# Patient Record
Sex: MALE | Race: WHITE | NOT HISPANIC OR LATINO | Employment: UNEMPLOYED | URBAN - METROPOLITAN AREA
[De-identification: names, ages, dates, MRNs, and addresses within clinical notes are randomized per-mention and may not be internally consistent; named-entity substitution may affect disease eponyms.]

---

## 2018-07-17 PROBLEM — L02.413 CUTANEOUS ABSCESS OF RIGHT UPPER EXTREMITY: Status: ACTIVE | Noted: 2018-07-17

## 2018-07-18 PROBLEM — D72.829 LEUKOCYTOSIS: Status: ACTIVE | Noted: 2018-07-18

## 2018-07-18 PROBLEM — Z87.898 HISTORY OF INTRAVENOUS DRUG ABUSE: Status: ACTIVE | Noted: 2018-07-18

## 2018-07-18 PROBLEM — Z86.19 HISTORY OF HEPATITIS C: Status: ACTIVE | Noted: 2018-07-18

## 2018-07-18 PROBLEM — F32.9 MAJOR DEPRESSIVE DISORDER: Chronic | Status: ACTIVE | Noted: 2018-07-18

## 2018-07-18 PROBLEM — F41.1 GENERALIZED ANXIETY DISORDER: Chronic | Status: ACTIVE | Noted: 2018-07-18

## 2019-01-01 ENCOUNTER — ANESTHESIA (OUTPATIENT)
Dept: SURGERY | Facility: HOSPITAL | Age: 34
DRG: 951 | End: 2019-01-01
Payer: COMMERCIAL

## 2019-01-01 ENCOUNTER — HOSPITAL ENCOUNTER (OUTPATIENT)
Dept: RADIOLOGY | Facility: HOSPITAL | Age: 34
Discharge: HOME | End: 2019-07-19
Attending: PHYSICIAN ASSISTANT
Payer: MEDICAID

## 2019-01-01 ENCOUNTER — HOSPITAL ENCOUNTER (INPATIENT)
Facility: HOSPITAL | Age: 34
LOS: 9 days | Discharge: NURSING, REHAB OR LONG-TERM CARE | DRG: 917 | End: 2019-06-28
Attending: EMERGENCY MEDICINE | Admitting: ANESTHESIOLOGY
Payer: MEDICAID

## 2019-01-01 ENCOUNTER — HOSPITAL ENCOUNTER (INPATIENT)
Facility: HOSPITAL | Age: 34
LOS: 2 days | DRG: 951 | End: 2019-09-21
Payer: COMMERCIAL

## 2019-01-01 ENCOUNTER — HOSPITAL ENCOUNTER (INPATIENT)
Facility: HOSPITAL | Age: 34
LOS: 4 days | Discharge: ANOTHER HOSPITAL | DRG: 896 | End: 2019-06-19
Attending: HOSPITALIST | Admitting: HOSPITALIST
Payer: MEDICAID

## 2019-01-01 ENCOUNTER — TELEPHONE (OUTPATIENT)
Dept: NEUROLOGY | Facility: CLINIC | Age: 34
End: 2019-01-01

## 2019-01-01 ENCOUNTER — ANESTHESIA EVENT (OUTPATIENT)
Dept: SURGERY | Facility: HOSPITAL | Age: 34
DRG: 951 | End: 2019-01-01
Payer: COMMERCIAL

## 2019-01-01 ENCOUNTER — TELEPHONE (OUTPATIENT)
Dept: NEUROSURGERY | Facility: CLINIC | Age: 34
End: 2019-01-01

## 2019-01-01 ENCOUNTER — HOSPITAL ENCOUNTER (INPATIENT)
Facility: HOSPITAL | Age: 34
LOS: 1 days | DRG: 917 | End: 2019-09-19
Attending: INTERNAL MEDICINE | Admitting: INTERNAL MEDICINE
Payer: COMMERCIAL

## 2019-01-01 VITALS
TEMPERATURE: 99 F | SYSTOLIC BLOOD PRESSURE: 114 MMHG | BODY MASS INDEX: 18.75 KG/M2 | HEIGHT: 70 IN | OXYGEN SATURATION: 93 % | RESPIRATION RATE: 18 BRPM | DIASTOLIC BLOOD PRESSURE: 70 MMHG | HEART RATE: 99 BPM | WEIGHT: 131 LBS

## 2019-01-01 VITALS
WEIGHT: 139.56 LBS | HEIGHT: 68 IN | TEMPERATURE: 99 F | DIASTOLIC BLOOD PRESSURE: 50 MMHG | RESPIRATION RATE: 28 BRPM | SYSTOLIC BLOOD PRESSURE: 92 MMHG | BODY MASS INDEX: 21.15 KG/M2 | HEART RATE: 129 BPM | OXYGEN SATURATION: 90 %

## 2019-01-01 VITALS
WEIGHT: 131.38 LBS | HEART RATE: 95 BPM | BODY MASS INDEX: 18.81 KG/M2 | TEMPERATURE: 101 F | SYSTOLIC BLOOD PRESSURE: 130 MMHG | HEIGHT: 70 IN | OXYGEN SATURATION: 97 % | RESPIRATION RATE: 29 BRPM | DIASTOLIC BLOOD PRESSURE: 64 MMHG

## 2019-01-01 VITALS
BODY MASS INDEX: 20.65 KG/M2 | RESPIRATION RATE: 28 BRPM | HEART RATE: 103 BPM | WEIGHT: 135.81 LBS | SYSTOLIC BLOOD PRESSURE: 141 MMHG | TEMPERATURE: 96 F | DIASTOLIC BLOOD PRESSURE: 99 MMHG | OXYGEN SATURATION: 100 %

## 2019-01-01 DIAGNOSIS — R41.0 DELIRIUM: ICD-10-CM

## 2019-01-01 DIAGNOSIS — I61.9 NONTRAUMATIC INTRACEREBRAL HEMORRHAGE, UNSPECIFIED CEREBRAL LOCATION, UNSPECIFIED LATERALITY: Primary | ICD-10-CM

## 2019-01-01 DIAGNOSIS — I61.1 NONTRAUMATIC CORTICAL HEMORRHAGE OF RIGHT CEREBRAL HEMISPHERE: ICD-10-CM

## 2019-01-01 DIAGNOSIS — D72.829 LEUKOCYTOSIS, UNSPECIFIED TYPE: ICD-10-CM

## 2019-01-01 DIAGNOSIS — R74.01 ELEVATED TRANSAMINASE LEVEL: ICD-10-CM

## 2019-01-01 DIAGNOSIS — Z52.9 ORGAN DONATION: ICD-10-CM

## 2019-01-01 DIAGNOSIS — I61.1 NONTRAUMATIC CORTICAL HEMORRHAGE OF RIGHT CEREBRAL HEMISPHERE: Primary | ICD-10-CM

## 2019-01-01 DIAGNOSIS — F19.10 DRUG ABUSE, IV: ICD-10-CM

## 2019-01-01 DIAGNOSIS — J96.01 ACUTE RESPIRATORY FAILURE WITH HYPOXIA: ICD-10-CM

## 2019-01-01 DIAGNOSIS — G81.94 LEFT HEMIPARESIS: ICD-10-CM

## 2019-01-01 DIAGNOSIS — G93.1 ANOXIC BRAIN INJURY: ICD-10-CM

## 2019-01-01 DIAGNOSIS — R09.89 JVD (JUGULAR VENOUS DISTENSION): ICD-10-CM

## 2019-01-01 DIAGNOSIS — M62.82 NON-TRAUMATIC RHABDOMYOLYSIS: ICD-10-CM

## 2019-01-01 DIAGNOSIS — I61.0 NONTRAUMATIC SUBCORTICAL HEMORRHAGE OF CEREBRAL HEMISPHERE, UNSPECIFIED LATERALITY: ICD-10-CM

## 2019-01-01 DIAGNOSIS — I62.9 INTRACRANIAL HEMORRHAGE: ICD-10-CM

## 2019-01-01 DIAGNOSIS — I61.9 ICH (INTRACEREBRAL HEMORRHAGE): ICD-10-CM

## 2019-01-01 DIAGNOSIS — I61.1 NONTRAUMATIC CORTICAL HEMORRHAGE OF CEREBRAL HEMISPHERE, UNSPECIFIED LATERALITY: ICD-10-CM

## 2019-01-01 DIAGNOSIS — G93.5 BRAIN COMPRESSION: ICD-10-CM

## 2019-01-01 DIAGNOSIS — I46.9 CARDIAC ARREST: ICD-10-CM

## 2019-01-01 DIAGNOSIS — I61.1: ICD-10-CM

## 2019-01-01 DIAGNOSIS — E87.5 HYPERKALEMIA: ICD-10-CM

## 2019-01-01 DIAGNOSIS — I61.5 IVH (INTRAVENTRICULAR HEMORRHAGE): ICD-10-CM

## 2019-01-01 LAB
ABO + RH BLD: NORMAL
ALBUMIN SERPL BCP-MCNC: 2.6 G/DL (ref 3.5–5.2)
ALBUMIN SERPL BCP-MCNC: 2.7 G/DL (ref 3.5–5.2)
ALBUMIN SERPL BCP-MCNC: 2.8 G/DL (ref 3.5–5.2)
ALBUMIN SERPL BCP-MCNC: 2.9 G/DL (ref 3.5–5.2)
ALBUMIN SERPL BCP-MCNC: 3 G/DL (ref 3.5–5.2)
ALBUMIN SERPL BCP-MCNC: 3.2 G/DL (ref 3.5–5.2)
ALBUMIN SERPL BCP-MCNC: 3.3 G/DL (ref 3.5–5.2)
ALBUMIN SERPL BCP-MCNC: 3.6 G/DL (ref 3.5–5.2)
ALBUMIN SERPL BCP-MCNC: 3.7 G/DL (ref 3.5–5.2)
ALBUMIN SERPL BCP-MCNC: 3.8 G/DL (ref 3.5–5.2)
ALBUMIN SERPL BCP-MCNC: 3.8 G/DL (ref 3.5–5.2)
ALBUMIN SERPL BCP-MCNC: 3.9 G/DL (ref 3.5–5.2)
ALBUMIN SERPL BCP-MCNC: 3.9 G/DL (ref 3.5–5.2)
ALBUMIN SERPL BCP-MCNC: 4 G/DL (ref 3.5–5.2)
ALBUMIN SERPL BCP-MCNC: 4.2 G/DL (ref 3.5–5.2)
ALLENS TEST: ABNORMAL
ALP SERPL-CCNC: 100 U/L (ref 55–135)
ALP SERPL-CCNC: 101 U/L (ref 55–135)
ALP SERPL-CCNC: 107 U/L (ref 55–135)
ALP SERPL-CCNC: 109 U/L (ref 55–135)
ALP SERPL-CCNC: 54 U/L (ref 55–135)
ALP SERPL-CCNC: 54 U/L (ref 55–135)
ALP SERPL-CCNC: 59 U/L (ref 55–135)
ALP SERPL-CCNC: 61 U/L (ref 55–135)
ALP SERPL-CCNC: 62 U/L (ref 55–135)
ALP SERPL-CCNC: 64 U/L (ref 55–135)
ALP SERPL-CCNC: 66 U/L (ref 55–135)
ALP SERPL-CCNC: 70 U/L (ref 55–135)
ALP SERPL-CCNC: 73 U/L (ref 55–135)
ALP SERPL-CCNC: 77 U/L (ref 55–135)
ALP SERPL-CCNC: 78 U/L (ref 55–135)
ALP SERPL-CCNC: 93 U/L (ref 55–135)
ALP SERPL-CCNC: 98 U/L (ref 55–135)
ALT SERPL W/O P-5'-P-CCNC: 105 U/L (ref 10–44)
ALT SERPL W/O P-5'-P-CCNC: 108 U/L (ref 10–44)
ALT SERPL W/O P-5'-P-CCNC: 111 U/L (ref 10–44)
ALT SERPL W/O P-5'-P-CCNC: 143 U/L (ref 10–44)
ALT SERPL W/O P-5'-P-CCNC: 168 U/L (ref 10–44)
ALT SERPL W/O P-5'-P-CCNC: 186 U/L (ref 10–44)
ALT SERPL W/O P-5'-P-CCNC: 215 U/L (ref 10–44)
ALT SERPL W/O P-5'-P-CCNC: 237 U/L (ref 10–44)
ALT SERPL W/O P-5'-P-CCNC: 272 U/L (ref 10–44)
ALT SERPL W/O P-5'-P-CCNC: 37 U/L (ref 10–44)
ALT SERPL W/O P-5'-P-CCNC: 41 U/L (ref 10–44)
ALT SERPL W/O P-5'-P-CCNC: 46 U/L (ref 10–44)
ALT SERPL W/O P-5'-P-CCNC: 49 U/L (ref 10–44)
ALT SERPL W/O P-5'-P-CCNC: 52 U/L (ref 10–44)
ALT SERPL W/O P-5'-P-CCNC: 64 U/L (ref 10–44)
ALT SERPL W/O P-5'-P-CCNC: 74 U/L (ref 10–44)
ALT SERPL W/O P-5'-P-CCNC: 79 U/L (ref 10–44)
ALT SERPL W/O P-5'-P-CCNC: 89 U/L (ref 10–44)
ALT SERPL W/O P-5'-P-CCNC: 89 U/L (ref 10–44)
AMORPH CRY UR QL COMP ASSIST: ABNORMAL
AMYLASE SERPL-CCNC: 107 U/L (ref 20–110)
AMYLASE SERPL-CCNC: 115 U/L (ref 20–110)
AMYLASE SERPL-CCNC: 234 U/L (ref 20–110)
AMYLASE SERPL-CCNC: 300 U/L (ref 20–110)
ANION GAP SERPL CALC-SCNC: 10 MMOL/L (ref 8–16)
ANION GAP SERPL CALC-SCNC: 11 MMOL/L (ref 8–16)
ANION GAP SERPL CALC-SCNC: 12 MMOL/L (ref 8–16)
ANION GAP SERPL CALC-SCNC: 13 MMOL/L (ref 8–16)
ANION GAP SERPL CALC-SCNC: 14 MMOL/L (ref 8–16)
ANION GAP SERPL CALC-SCNC: 16 MMOL/L (ref 8–16)
ANION GAP SERPL CALC-SCNC: 16 MMOL/L (ref 8–16)
ANION GAP SERPL CALC-SCNC: 8 MMOL/L (ref 8–16)
ANION GAP SERPL CALC-SCNC: 9 MMOL/L (ref 8–16)
ANISOCYTOSIS BLD QL SMEAR: SLIGHT
AORTIC ROOT ANNULUS: 3.25 CM
AORTIC VALVE CUSP SEPERATION: 2.14 CM
APTT BLDCRRT: 28.4 SEC (ref 21–32)
APTT BLDCRRT: 29.6 SEC (ref 21–32)
APTT BLDCRRT: 32.2 SEC (ref 21–32)
APTT BLDCRRT: 34.2 SEC (ref 21–32)
APTT BLDCRRT: 34.5 SEC (ref 21–32)
APTT BLDCRRT: 34.9 SEC (ref 21–32)
APTT BLDCRRT: 34.9 SEC (ref 21–32)
APTT BLDCRRT: 35.4 SEC (ref 21–32)
APTT BLDCRRT: 35.8 SEC (ref 21–32)
AST SERPL-CCNC: 119 U/L (ref 10–40)
AST SERPL-CCNC: 124 U/L (ref 10–40)
AST SERPL-CCNC: 139 U/L (ref 10–40)
AST SERPL-CCNC: 24 U/L (ref 10–40)
AST SERPL-CCNC: 25 U/L (ref 10–40)
AST SERPL-CCNC: 27 U/L (ref 10–40)
AST SERPL-CCNC: 33 U/L (ref 10–40)
AST SERPL-CCNC: 34 U/L (ref 10–40)
AST SERPL-CCNC: 343 U/L (ref 10–40)
AST SERPL-CCNC: 418 U/L (ref 10–40)
AST SERPL-CCNC: 44 U/L (ref 10–40)
AST SERPL-CCNC: 53 U/L (ref 10–40)
AST SERPL-CCNC: 68 U/L (ref 10–40)
AST SERPL-CCNC: 72 U/L (ref 10–40)
AST SERPL-CCNC: 89 U/L (ref 10–40)
AST SERPL-CCNC: 92 U/L (ref 10–40)
AST SERPL-CCNC: 92 U/L (ref 10–40)
AV INDEX (PROSTH): 0.8
AV INDEX (PROSTH): 1.05
AV INDEX (PROSTH): 1.06
AV MEAN GRADIENT: 1 MMHG
AV MEAN GRADIENT: 3 MMHG
AV MEAN GRADIENT: 6.32 MMHG
AV PEAK GRADIENT: 2 MMHG
AV PEAK GRADIENT: 6 MMHG
AV PEAK GRADIENT: 9.73 MMHG
AV VALVE AREA: 2.9 CM2
AV VALVE AREA: 3.02 CM2
AV VALVE AREA: 3.53 CM2
AV VELOCITY RATIO: 0.74
AV VELOCITY RATIO: 1.03
AV VELOCITY RATIO: 1.04
BACTERIA #/AREA URNS AUTO: ABNORMAL /HPF
BACTERIA #/AREA URNS AUTO: ABNORMAL /HPF
BACTERIA #/AREA URNS HPF: ABNORMAL /HPF
BACTERIA #/AREA URNS HPF: NORMAL /HPF
BACTERIA #/AREA URNS HPF: NORMAL /HPF
BACTERIA BLD CULT: NORMAL
BACTERIA SPEC AEROBE CULT: NORMAL
BACTERIA SPEC AEROBE CULT: NORMAL
BACTERIA UR CULT: NO GROWTH
BASOPHILS # BLD AUTO: 0 K/UL (ref 0–0.2)
BASOPHILS # BLD AUTO: 0 K/UL (ref 0–0.2)
BASOPHILS # BLD AUTO: 0.01 K/UL (ref 0–0.2)
BASOPHILS # BLD AUTO: 0.01 K/UL (ref 0–0.2)
BASOPHILS # BLD AUTO: 0.02 K/UL (ref 0–0.2)
BASOPHILS # BLD AUTO: 0.03 K/UL (ref 0–0.2)
BASOPHILS # BLD AUTO: 0.04 K/UL (ref 0–0.2)
BASOPHILS # BLD AUTO: 0.06 K/UL (ref 0–0.2)
BASOPHILS # BLD AUTO: ABNORMAL K/UL (ref 0–0.2)
BASOPHILS NFR BLD: 0 % (ref 0–1.9)
BASOPHILS NFR BLD: 0.1 % (ref 0–1.9)
BASOPHILS NFR BLD: 0.2 % (ref 0–1.9)
BASOPHILS NFR BLD: 0.3 % (ref 0–1.9)
BASOPHILS NFR BLD: 0.3 % (ref 0–1.9)
BILIRUB DIRECT SERPL-MCNC: 0.2 MG/DL (ref 0.1–0.3)
BILIRUB DIRECT SERPL-MCNC: 0.2 MG/DL (ref 0.1–0.3)
BILIRUB DIRECT SERPL-MCNC: 0.3 MG/DL (ref 0.1–0.3)
BILIRUB DIRECT SERPL-MCNC: 0.4 MG/DL (ref 0.1–0.3)
BILIRUB DIRECT SERPL-MCNC: 0.5 MG/DL (ref 0.1–0.3)
BILIRUB SERPL-MCNC: 0.4 MG/DL (ref 0.1–1)
BILIRUB SERPL-MCNC: 0.5 MG/DL (ref 0.1–1)
BILIRUB SERPL-MCNC: 0.6 MG/DL (ref 0.1–1)
BILIRUB SERPL-MCNC: 0.7 MG/DL (ref 0.1–1)
BILIRUB SERPL-MCNC: 0.7 MG/DL (ref 0.1–1)
BILIRUB SERPL-MCNC: 0.8 MG/DL (ref 0.1–1)
BILIRUB SERPL-MCNC: 0.8 MG/DL (ref 0.1–1)
BILIRUB SERPL-MCNC: 0.9 MG/DL (ref 0.1–1)
BILIRUB SERPL-MCNC: 0.9 MG/DL (ref 0.1–1)
BILIRUB SERPL-MCNC: 1 MG/DL (ref 0.1–1)
BILIRUB SERPL-MCNC: 1 MG/DL (ref 0.1–1)
BILIRUB SERPL-MCNC: 1.1 MG/DL (ref 0.1–1)
BILIRUB SERPL-MCNC: 1.1 MG/DL (ref 0.1–1)
BILIRUB SERPL-MCNC: 1.2 MG/DL (ref 0.1–1)
BILIRUB SERPL-MCNC: 1.3 MG/DL (ref 0.1–1)
BILIRUB SERPL-MCNC: 1.3 MG/DL (ref 0.1–1)
BILIRUB UR QL STRIP: ABNORMAL
BILIRUB UR QL STRIP: NEGATIVE
BLD GP AB SCN CELLS X3 SERPL QL: NORMAL
BLD PROD TYP BPU: NORMAL
BLD PROD TYP BPU: NORMAL
BLOOD UNIT EXPIRATION DATE: NORMAL
BLOOD UNIT EXPIRATION DATE: NORMAL
BLOOD UNIT TYPE CODE: 6200
BLOOD UNIT TYPE CODE: 6200
BLOOD UNIT TYPE: NORMAL
BLOOD UNIT TYPE: NORMAL
BSA FOR ECHO PROCEDURE: 1.71 M2
BSA FOR ECHO PROCEDURE: 1.74 M2
BUN SERPL-MCNC: 10 MG/DL (ref 6–20)
BUN SERPL-MCNC: 10 MG/DL (ref 6–20)
BUN SERPL-MCNC: 11 MG/DL (ref 6–20)
BUN SERPL-MCNC: 12 MG/DL (ref 6–20)
BUN SERPL-MCNC: 13 MG/DL (ref 6–20)
BUN SERPL-MCNC: 14 MG/DL (ref 6–20)
BUN SERPL-MCNC: 15 MG/DL (ref 6–20)
BUN SERPL-MCNC: 16 MG/DL (ref 6–20)
BUN SERPL-MCNC: 16 MG/DL (ref 6–20)
BUN SERPL-MCNC: 19 MG/DL (ref 6–20)
BUN SERPL-MCNC: 20 MG/DL (ref 6–20)
BUN SERPL-MCNC: 20 MG/DL (ref 6–20)
BUN SERPL-MCNC: 22 MG/DL (ref 6–20)
BUN SERPL-MCNC: 26 MG/DL (ref 6–20)
BUN SERPL-MCNC: 8 MG/DL (ref 6–20)
BUN SERPL-MCNC: 9 MG/DL (ref 6–20)
BUN SERPL-MCNC: 9 MG/DL (ref 6–20)
C TRACH DNA SPEC QL NAA+PROBE: NOT DETECTED
CALCIUM SERPL-MCNC: 7.1 MG/DL (ref 8.7–10.5)
CALCIUM SERPL-MCNC: 7.1 MG/DL (ref 8.7–10.5)
CALCIUM SERPL-MCNC: 7.5 MG/DL (ref 8.7–10.5)
CALCIUM SERPL-MCNC: 7.9 MG/DL (ref 8.7–10.5)
CALCIUM SERPL-MCNC: 8 MG/DL (ref 8.7–10.5)
CALCIUM SERPL-MCNC: 8.4 MG/DL (ref 8.7–10.5)
CALCIUM SERPL-MCNC: 8.6 MG/DL (ref 8.7–10.5)
CALCIUM SERPL-MCNC: 8.7 MG/DL (ref 8.7–10.5)
CALCIUM SERPL-MCNC: 8.7 MG/DL (ref 8.7–10.5)
CALCIUM SERPL-MCNC: 8.8 MG/DL (ref 8.7–10.5)
CALCIUM SERPL-MCNC: 8.9 MG/DL (ref 8.7–10.5)
CALCIUM SERPL-MCNC: 9 MG/DL (ref 8.7–10.5)
CALCIUM SERPL-MCNC: 9.1 MG/DL (ref 8.7–10.5)
CALCIUM SERPL-MCNC: 9.2 MG/DL (ref 8.7–10.5)
CALCIUM SERPL-MCNC: 9.2 MG/DL (ref 8.7–10.5)
CALCIUM SERPL-MCNC: 9.3 MG/DL (ref 8.7–10.5)
CALCIUM SERPL-MCNC: 9.5 MG/DL (ref 8.7–10.5)
CALCIUM SERPL-MCNC: 9.5 MG/DL (ref 8.7–10.5)
CALCIUM SERPL-MCNC: 9.6 MG/DL (ref 8.7–10.5)
CALCIUM SERPL-MCNC: 9.7 MG/DL (ref 8.7–10.5)
CHLORIDE SERPL-SCNC: 103 MMOL/L (ref 95–110)
CHLORIDE SERPL-SCNC: 104 MMOL/L (ref 95–110)
CHLORIDE SERPL-SCNC: 105 MMOL/L (ref 95–110)
CHLORIDE SERPL-SCNC: 106 MMOL/L (ref 95–110)
CHLORIDE SERPL-SCNC: 107 MMOL/L (ref 95–110)
CHLORIDE SERPL-SCNC: 108 MMOL/L (ref 95–110)
CHLORIDE SERPL-SCNC: 109 MMOL/L (ref 95–110)
CHLORIDE SERPL-SCNC: 110 MMOL/L (ref 95–110)
CHLORIDE SERPL-SCNC: 112 MMOL/L (ref 95–110)
CHLORIDE SERPL-SCNC: 112 MMOL/L (ref 95–110)
CHLORIDE SERPL-SCNC: 113 MMOL/L (ref 95–110)
CHLORIDE SERPL-SCNC: 114 MMOL/L (ref 95–110)
CHLORIDE SERPL-SCNC: 115 MMOL/L (ref 95–110)
CHLORIDE SERPL-SCNC: 116 MMOL/L (ref 95–110)
CHLORIDE SERPL-SCNC: 118 MMOL/L (ref 95–110)
CHLORIDE SERPL-SCNC: 119 MMOL/L (ref 95–110)
CHLORIDE SERPL-SCNC: 122 MMOL/L (ref 95–110)
CHLORIDE SERPL-SCNC: 123 MMOL/L (ref 95–110)
CHLORIDE SERPL-SCNC: 125 MMOL/L (ref 95–110)
CHLORIDE SERPL-SCNC: 126 MMOL/L (ref 95–110)
CHLORIDE SERPL-SCNC: 127 MMOL/L (ref 95–110)
CHOLEST SERPL-MCNC: 144 MG/DL (ref 120–199)
CHOLEST/HDLC SERPL: 4.6 {RATIO} (ref 2–5)
CK MB SERPL-MCNC: 10.3 NG/ML (ref 0.1–6.5)
CK MB SERPL-MCNC: 14.7 NG/ML (ref 0.1–6.5)
CK MB SERPL-MCNC: 6.5 NG/ML (ref 0.1–6.5)
CK MB SERPL-MCNC: 8.8 NG/ML (ref 0.1–6.5)
CK MB SERPL-RTO: 4.8 % (ref 0–5)
CK MB SERPL-RTO: 5.1 % (ref 0–5)
CK MB SERPL-RTO: 5.2 % (ref 0–5)
CK MB SERPL-RTO: 6.8 % (ref 0–5)
CK SERPL-CCNC: 128 U/L (ref 20–200)
CK SERPL-CCNC: 128 U/L (ref 20–200)
CK SERPL-CCNC: 1297 U/L (ref 20–200)
CK SERPL-CCNC: 183 U/L (ref 20–200)
CK SERPL-CCNC: 183 U/L (ref 20–200)
CK SERPL-CCNC: 197 U/L (ref 20–200)
CK SERPL-CCNC: 197 U/L (ref 20–200)
CK SERPL-CCNC: 2015 U/L (ref 20–200)
CK SERPL-CCNC: 2093 U/L (ref 20–200)
CK SERPL-CCNC: 216 U/L (ref 20–200)
CK SERPL-CCNC: 216 U/L (ref 20–200)
CK SERPL-CCNC: 232 U/L (ref 20–200)
CK SERPL-CCNC: 2529 U/L (ref 20–200)
CK SERPL-CCNC: 256 U/L (ref 20–200)
CK SERPL-CCNC: 285 U/L (ref 20–200)
CK SERPL-CCNC: 307 U/L (ref 20–200)
CK SERPL-CCNC: 4399 U/L (ref 20–200)
CK SERPL-CCNC: 488 U/L (ref 20–200)
CK SERPL-CCNC: 7400 U/L (ref 20–200)
CK SERPL-CCNC: 8286 U/L (ref 20–200)
CK SERPL-CCNC: ABNORMAL U/L (ref 20–200)
CLARITY UR REFRACT.AUTO: ABNORMAL
CLARITY UR REFRACT.AUTO: CLEAR
CLARITY UR: ABNORMAL
CLARITY UR: ABNORMAL
CLARITY UR: CLEAR
CLARITY UR: CLEAR
CO2 SERPL-SCNC: 14 MMOL/L (ref 23–29)
CO2 SERPL-SCNC: 16 MMOL/L (ref 23–29)
CO2 SERPL-SCNC: 17 MMOL/L (ref 23–29)
CO2 SERPL-SCNC: 18 MMOL/L (ref 23–29)
CO2 SERPL-SCNC: 19 MMOL/L (ref 23–29)
CO2 SERPL-SCNC: 19 MMOL/L (ref 23–29)
CO2 SERPL-SCNC: 20 MMOL/L (ref 23–29)
CO2 SERPL-SCNC: 21 MMOL/L (ref 23–29)
CO2 SERPL-SCNC: 22 MMOL/L (ref 23–29)
CO2 SERPL-SCNC: 23 MMOL/L (ref 23–29)
CO2 SERPL-SCNC: 24 MMOL/L (ref 23–29)
CO2 SERPL-SCNC: 24 MMOL/L (ref 23–29)
CODING SYSTEM: NORMAL
CODING SYSTEM: NORMAL
COLOR UR AUTO: YELLOW
COLOR UR AUTO: YELLOW
COLOR UR: COLORLESS
COLOR UR: YELLOW
CREAT SERPL-MCNC: 0.7 MG/DL (ref 0.5–1.4)
CREAT SERPL-MCNC: 0.8 MG/DL (ref 0.5–1.4)
CREAT SERPL-MCNC: 0.9 MG/DL (ref 0.5–1.4)
CREAT SERPL-MCNC: 0.9 MG/DL (ref 0.5–1.4)
CREAT SERPL-MCNC: 1.3 MG/DL (ref 0.5–1.4)
CREAT SERPL-MCNC: 1.4 MG/DL (ref 0.5–1.4)
CREAT SERPL-MCNC: 1.5 MG/DL (ref 0.5–1.4)
CREAT SERPL-MCNC: 1.7 MG/DL (ref 0.5–1.4)
CREAT SERPL-MCNC: 1.8 MG/DL (ref 0.5–1.4)
CREAT SERPL-MCNC: 1.9 MG/DL (ref 0.5–1.4)
CREAT SERPL-MCNC: 2 MG/DL (ref 0.5–1.4)
CREAT SERPL-MCNC: 2 MG/DL (ref 0.5–1.4)
CREAT SERPL-MCNC: 2.2 MG/DL (ref 0.5–1.4)
CV ECHO LV RWT: 0.22 CM
CV ECHO LV RWT: 0.38 CM
CV ECHO LV RWT: 0.52 CM
DELSYS: ABNORMAL
DIFFERENTIAL METHOD: ABNORMAL
DISPENSE STATUS: NORMAL
DISPENSE STATUS: NORMAL
DOP CALC AO PEAK VEL: 0.72 M/S
DOP CALC AO PEAK VEL: 1.23 M/S
DOP CALC AO PEAK VEL: 1.56 M/S
DOP CALC AO VTI: 12.71 CM
DOP CALC AO VTI: 15.09 CM
DOP CALC AO VTI: 29.82 CM
DOP CALC LVOT AREA: 2.7 CM2
DOP CALC LVOT AREA: 3.36 CM2
DOP CALC LVOT AREA: 3.8 CM2
DOP CALC LVOT DIAMETER: 1.87 CM
DOP CALC LVOT DIAMETER: 2.07 CM
DOP CALC LVOT DIAMETER: 2.19 CM
DOP CALC LVOT PEAK VEL: 0.74 M/S
DOP CALC LVOT PEAK VEL: 0.91 M/S
DOP CALC LVOT PEAK VEL: 1.62 M/S
DOP CALC LVOT STROKE VOLUME: 105.38 CM3
DOP CALC LVOT STROKE VOLUME: 36.92 CM3
DOP CALC LVOT STROKE VOLUME: 45.56 CM3
DOP CALCLVOT PEAK VEL VTI: 12.1 CM
DOP CALCLVOT PEAK VEL VTI: 13.45 CM
DOP CALCLVOT PEAK VEL VTI: 31.33 CM
E WAVE DECELERATION TIME: 155.29 MSEC
E WAVE DECELERATION TIME: 175.86 MSEC
E WAVE DECELERATION TIME: 82.71 MSEC
E/A RATIO: 1.23
E/A RATIO: 1.63
E/A RATIO: 2.1
E/E' RATIO: 10 M/S
E/E' RATIO: 7.27
E/E' RATIO: 9.29 M/S
ECHO LV POSTERIOR WALL: 0.59 CM (ref 0.6–1.1)
ECHO LV POSTERIOR WALL: 0.89 CM (ref 0.6–1.1)
ECHO LV POSTERIOR WALL: 1.12 CM (ref 0.6–1.1)
EOSINOPHIL # BLD AUTO: 0 K/UL (ref 0–0.5)
EOSINOPHIL # BLD AUTO: 0.1 K/UL (ref 0–0.5)
EOSINOPHIL # BLD AUTO: 0.2 K/UL (ref 0–0.5)
EOSINOPHIL # BLD AUTO: 0.2 K/UL (ref 0–0.5)
EOSINOPHIL # BLD AUTO: ABNORMAL K/UL (ref 0–0.5)
EOSINOPHIL NFR BLD: 0 % (ref 0–8)
EOSINOPHIL NFR BLD: 0.1 % (ref 0–8)
EOSINOPHIL NFR BLD: 0.2 % (ref 0–8)
EOSINOPHIL NFR BLD: 0.2 % (ref 0–8)
EOSINOPHIL NFR BLD: 0.4 % (ref 0–8)
EOSINOPHIL NFR BLD: 0.7 % (ref 0–8)
EOSINOPHIL NFR BLD: 0.9 % (ref 0–8)
EOSINOPHIL NFR BLD: 1.2 % (ref 0–8)
EOSINOPHIL NFR BLD: 1.8 % (ref 0–8)
ERYTHROCYTE [DISTWIDTH] IN BLOOD BY AUTOMATED COUNT: 12.7 % (ref 11.5–14.5)
ERYTHROCYTE [DISTWIDTH] IN BLOOD BY AUTOMATED COUNT: 12.7 % (ref 11.5–14.5)
ERYTHROCYTE [DISTWIDTH] IN BLOOD BY AUTOMATED COUNT: 12.9 % (ref 11.5–14.5)
ERYTHROCYTE [DISTWIDTH] IN BLOOD BY AUTOMATED COUNT: 13 % (ref 11.5–14.5)
ERYTHROCYTE [DISTWIDTH] IN BLOOD BY AUTOMATED COUNT: 13.2 % (ref 11.5–14.5)
ERYTHROCYTE [DISTWIDTH] IN BLOOD BY AUTOMATED COUNT: 13.2 % (ref 11.5–14.5)
ERYTHROCYTE [DISTWIDTH] IN BLOOD BY AUTOMATED COUNT: 13.5 % (ref 11.5–14.5)
ERYTHROCYTE [DISTWIDTH] IN BLOOD BY AUTOMATED COUNT: 13.7 % (ref 11.5–14.5)
ERYTHROCYTE [DISTWIDTH] IN BLOOD BY AUTOMATED COUNT: 14.1 % (ref 11.5–14.5)
ERYTHROCYTE [DISTWIDTH] IN BLOOD BY AUTOMATED COUNT: 14.2 % (ref 11.5–14.5)
ERYTHROCYTE [DISTWIDTH] IN BLOOD BY AUTOMATED COUNT: 14.8 % (ref 11.5–14.5)
ERYTHROCYTE [DISTWIDTH] IN BLOOD BY AUTOMATED COUNT: 14.9 % (ref 11.5–14.5)
ERYTHROCYTE [DISTWIDTH] IN BLOOD BY AUTOMATED COUNT: 15 % (ref 11.5–14.5)
ERYTHROCYTE [DISTWIDTH] IN BLOOD BY AUTOMATED COUNT: 15 % (ref 11.5–14.5)
ERYTHROCYTE [DISTWIDTH] IN BLOOD BY AUTOMATED COUNT: 15.1 % (ref 11.5–14.5)
ERYTHROCYTE [DISTWIDTH] IN BLOOD BY AUTOMATED COUNT: 15.2 % (ref 11.5–14.5)
ERYTHROCYTE [DISTWIDTH] IN BLOOD BY AUTOMATED COUNT: 15.3 % (ref 11.5–14.5)
ERYTHROCYTE [DISTWIDTH] IN BLOOD BY AUTOMATED COUNT: 15.3 % (ref 11.5–14.5)
ERYTHROCYTE [DISTWIDTH] IN BLOOD BY AUTOMATED COUNT: 15.4 % (ref 11.5–14.5)
ERYTHROCYTE [DISTWIDTH] IN BLOOD BY AUTOMATED COUNT: 15.5 % (ref 11.5–14.5)
ERYTHROCYTE [SEDIMENTATION RATE] IN BLOOD BY WESTERGREN METHOD: 25 MM/HR (ref 0–23)
ERYTHROCYTE [SEDIMENTATION RATE] IN BLOOD BY WESTERGREN METHOD: 28 MM/H
EST. GFR  (AFRICAN AMERICAN): 44 ML/MIN/1.73 M^2
EST. GFR  (AFRICAN AMERICAN): 49 ML/MIN/1.73 M^2
EST. GFR  (AFRICAN AMERICAN): 49 ML/MIN/1.73 M^2
EST. GFR  (AFRICAN AMERICAN): 52 ML/MIN/1.73 M^2
EST. GFR  (AFRICAN AMERICAN): 56 ML/MIN/1.73 M^2
EST. GFR  (AFRICAN AMERICAN): 60 ML/MIN/1.73 M^2
EST. GFR  (AFRICAN AMERICAN): >60 ML/MIN/1.73 M^2
EST. GFR  (NON AFRICAN AMERICAN): 38 ML/MIN/1.73 M^2
EST. GFR  (NON AFRICAN AMERICAN): 43 ML/MIN/1.73 M^2
EST. GFR  (NON AFRICAN AMERICAN): 43 ML/MIN/1.73 M^2
EST. GFR  (NON AFRICAN AMERICAN): 45 ML/MIN/1.73 M^2
EST. GFR  (NON AFRICAN AMERICAN): 48 ML/MIN/1.73 M^2
EST. GFR  (NON AFRICAN AMERICAN): 52 ML/MIN/1.73 M^2
EST. GFR  (NON AFRICAN AMERICAN): >60 ML/MIN/1.73 M^2
ESTIMATED AVG GLUCOSE: 103 MG/DL (ref 68–131)
ESTIMATED AVG GLUCOSE: 103 MG/DL (ref 68–131)
FIO2: 100
FIO2: 40
FIO2: 50
FIO2: 60
FRACTIONAL SHORTENING: 21 % (ref 28–44)
FRACTIONAL SHORTENING: 34 % (ref 28–44)
FRACTIONAL SHORTENING: 40 % (ref 28–44)
GGT SERPL-CCNC: 83 U/L (ref 8–55)
GGT SERPL-CCNC: 88 U/L (ref 8–55)
GGT SERPL-CCNC: 88 U/L (ref 8–55)
GGT SERPL-CCNC: 92 U/L (ref 8–55)
GGT SERPL-CCNC: 93 U/L (ref 8–55)
GGT SERPL-CCNC: 98 U/L (ref 8–55)
GLUCOSE SERPL-MCNC: 102 MG/DL (ref 70–110)
GLUCOSE SERPL-MCNC: 103 MG/DL (ref 70–110)
GLUCOSE SERPL-MCNC: 104 MG/DL (ref 70–110)
GLUCOSE SERPL-MCNC: 104 MG/DL (ref 70–110)
GLUCOSE SERPL-MCNC: 105 MG/DL (ref 70–110)
GLUCOSE SERPL-MCNC: 108 MG/DL (ref 70–110)
GLUCOSE SERPL-MCNC: 108 MG/DL (ref 70–110)
GLUCOSE SERPL-MCNC: 110 MG/DL (ref 70–110)
GLUCOSE SERPL-MCNC: 114 MG/DL (ref 70–110)
GLUCOSE SERPL-MCNC: 116 MG/DL (ref 70–110)
GLUCOSE SERPL-MCNC: 116 MG/DL (ref 70–110)
GLUCOSE SERPL-MCNC: 117 MG/DL (ref 70–110)
GLUCOSE SERPL-MCNC: 167 MG/DL (ref 70–110)
GLUCOSE SERPL-MCNC: 181 MG/DL (ref 70–110)
GLUCOSE SERPL-MCNC: 183 MG/DL (ref 70–110)
GLUCOSE SERPL-MCNC: 188 MG/DL (ref 70–110)
GLUCOSE SERPL-MCNC: 215 MG/DL (ref 70–110)
GLUCOSE SERPL-MCNC: 226 MG/DL (ref 70–110)
GLUCOSE SERPL-MCNC: 234 MG/DL (ref 70–110)
GLUCOSE SERPL-MCNC: 285 MG/DL (ref 70–110)
GLUCOSE SERPL-MCNC: 73 MG/DL (ref 70–110)
GLUCOSE SERPL-MCNC: 95 MG/DL (ref 70–110)
GLUCOSE SERPL-MCNC: 97 MG/DL (ref 70–110)
GLUCOSE SERPL-MCNC: 98 MG/DL (ref 70–110)
GLUCOSE UR QL STRIP: ABNORMAL
GLUCOSE UR QL STRIP: NEGATIVE
GRAM STN SPEC: NORMAL
HAV IGM SERPL QL IA: NEGATIVE
HBA1C MFR BLD HPLC: 5.2 % (ref 4–5.6)
HBA1C MFR BLD HPLC: 5.2 % (ref 4–5.6)
HBV CORE IGM SERPL QL IA: NEGATIVE
HBV SURFACE AG SERPL QL IA: NEGATIVE
HCO3 UR-SCNC: 14.9 MMOL/L (ref 24–28)
HCO3 UR-SCNC: 16.9 MMOL/L (ref 24–28)
HCO3 UR-SCNC: 17.6 MMOL/L (ref 24–28)
HCO3 UR-SCNC: 19.1 MMOL/L (ref 24–28)
HCO3 UR-SCNC: 19.3 MMOL/L (ref 24–28)
HCO3 UR-SCNC: 20.6 MMOL/L (ref 24–28)
HCO3 UR-SCNC: 20.9 MMOL/L (ref 24–28)
HCO3 UR-SCNC: 21 MMOL/L (ref 24–28)
HCO3 UR-SCNC: 21.1 MMOL/L (ref 24–28)
HCO3 UR-SCNC: 21.9 MMOL/L (ref 24–28)
HCO3 UR-SCNC: 22.5 MMOL/L (ref 24–28)
HCO3 UR-SCNC: 24.6 MMOL/L (ref 24–28)
HCO3 UR-SCNC: 26.3 MMOL/L (ref 24–28)
HCT VFR BLD AUTO: 38.8 % (ref 40–54)
HCT VFR BLD AUTO: 40 % (ref 40–54)
HCT VFR BLD AUTO: 40.2 % (ref 40–54)
HCT VFR BLD AUTO: 41.2 % (ref 40–54)
HCT VFR BLD AUTO: 41.9 % (ref 40–54)
HCT VFR BLD AUTO: 41.9 % (ref 40–54)
HCT VFR BLD AUTO: 42 % (ref 40–54)
HCT VFR BLD AUTO: 43.6 % (ref 40–54)
HCT VFR BLD AUTO: 44.4 % (ref 40–54)
HCT VFR BLD AUTO: 44.4 % (ref 40–54)
HCT VFR BLD AUTO: 44.5 % (ref 40–54)
HCT VFR BLD AUTO: 44.9 % (ref 40–54)
HCT VFR BLD AUTO: 45.8 % (ref 40–54)
HCT VFR BLD AUTO: 46.8 % (ref 40–54)
HCT VFR BLD AUTO: 47 % (ref 40–54)
HCT VFR BLD AUTO: 48.2 % (ref 40–54)
HCT VFR BLD AUTO: 48.8 % (ref 40–54)
HCT VFR BLD AUTO: 49.2 % (ref 40–54)
HCT VFR BLD AUTO: 49.2 % (ref 40–54)
HCT VFR BLD AUTO: 49.7 % (ref 40–54)
HCT VFR BLD AUTO: 49.8 % (ref 40–54)
HCT VFR BLD AUTO: 49.8 % (ref 40–54)
HCT VFR BLD AUTO: 51.6 % (ref 40–54)
HCT VFR BLD AUTO: 53.7 % (ref 40–54)
HCT VFR BLD AUTO: 54.1 % (ref 40–54)
HCV AB SERPL QL IA: POSITIVE
HCV RNA SERPL NAA+PROBE-LOG IU: 5.2 LOG (10) IU/ML
HCV RNA SERPL QL NAA+PROBE: DETECTED IU/ML
HCV RNA SPEC NAA+PROBE-ACNC: ABNORMAL IU/ML
HDLC SERPL-MCNC: 31 MG/DL (ref 40–75)
HDLC SERPL: 21.5 % (ref 20–50)
HGB BLD-MCNC: 12.9 G/DL (ref 14–18)
HGB BLD-MCNC: 13.3 G/DL (ref 14–18)
HGB BLD-MCNC: 13.5 G/DL (ref 14–18)
HGB BLD-MCNC: 13.9 G/DL (ref 14–18)
HGB BLD-MCNC: 13.9 G/DL (ref 14–18)
HGB BLD-MCNC: 14.3 G/DL (ref 14–18)
HGB BLD-MCNC: 14.3 G/DL (ref 14–18)
HGB BLD-MCNC: 14.5 G/DL (ref 14–18)
HGB BLD-MCNC: 14.7 G/DL (ref 14–18)
HGB BLD-MCNC: 14.8 G/DL (ref 14–18)
HGB BLD-MCNC: 14.9 G/DL (ref 14–18)
HGB BLD-MCNC: 15.1 G/DL (ref 14–18)
HGB BLD-MCNC: 15.5 G/DL (ref 14–18)
HGB BLD-MCNC: 15.7 G/DL (ref 14–18)
HGB BLD-MCNC: 15.8 G/DL (ref 14–18)
HGB BLD-MCNC: 15.9 G/DL (ref 14–18)
HGB BLD-MCNC: 16.1 G/DL (ref 14–18)
HGB BLD-MCNC: 16.3 G/DL (ref 14–18)
HGB BLD-MCNC: 16.3 G/DL (ref 14–18)
HGB BLD-MCNC: 16.5 G/DL (ref 14–18)
HGB BLD-MCNC: 16.9 G/DL (ref 14–18)
HGB BLD-MCNC: 17 G/DL (ref 14–18)
HGB BLD-MCNC: 17.5 G/DL (ref 14–18)
HGB BLD-MCNC: 17.6 G/DL (ref 14–18)
HGB BLD-MCNC: 18.4 G/DL (ref 14–18)
HGB UR QL STRIP: ABNORMAL
HGB UR QL STRIP: NEGATIVE
HGB UR QL STRIP: NEGATIVE
HIV 1+2 AB+HIV1 P24 AG SERPL QL IA: NEGATIVE
HIV1+2 IGG SERPL QL IA.RAPID: NEGATIVE
HYALINE CASTS #/AREA URNS LPF: 0 /LPF
HYPOCHROMIA BLD QL SMEAR: ABNORMAL
IMM GRANULOCYTES # BLD AUTO: 0.04 K/UL (ref 0–0.04)
IMM GRANULOCYTES # BLD AUTO: 0.06 K/UL (ref 0–0.04)
IMM GRANULOCYTES # BLD AUTO: 0.09 K/UL (ref 0–0.04)
IMM GRANULOCYTES # BLD AUTO: 0.1 K/UL (ref 0–0.04)
IMM GRANULOCYTES # BLD AUTO: 0.11 K/UL (ref 0–0.04)
IMM GRANULOCYTES NFR BLD AUTO: 0.4 % (ref 0–0.5)
IMM GRANULOCYTES NFR BLD AUTO: 0.4 % (ref 0–0.5)
IMM GRANULOCYTES NFR BLD AUTO: 0.5 % (ref 0–0.5)
IMM GRANULOCYTES NFR BLD AUTO: 0.6 % (ref 0–0.5)
IMM GRANULOCYTES NFR BLD AUTO: 0.7 % (ref 0–0.5)
IMM GRANULOCYTES NFR BLD AUTO: 0.7 % (ref 0–0.5)
INR PPP: 1 (ref 0.8–1.2)
INR PPP: 1 (ref 0.8–1.2)
INR PPP: 1.1 (ref 0.8–1.2)
INTERVENTRICULAR SEPTUM: 0.65 CM (ref 0.6–1.1)
INTERVENTRICULAR SEPTUM: 0.8 CM (ref 0.6–1.1)
INTERVENTRICULAR SEPTUM: 1.15 CM (ref 0.6–1.1)
IVRT: 0.08 MSEC
IVRT: 0.12 MSEC
KETONES UR QL STRIP: ABNORMAL
KETONES UR QL STRIP: NEGATIVE
LA MAJOR: 3.49 CM
LA MAJOR: 3.7 CM
LA MAJOR: 4.49 CM
LA MINOR: 4.18 CM
LA MINOR: 4.82 CM
LA MINOR: 4.9 CM
LA WIDTH: 2.17 CM
LA WIDTH: 2.66 CM
LA WIDTH: 3.95 CM
LACTATE SERPL-SCNC: 1 MMOL/L (ref 0.5–2.2)
LACTATE SERPL-SCNC: 1.2 MMOL/L (ref 0.5–2.2)
LACTATE SERPL-SCNC: 1.4 MMOL/L (ref 0.5–2.2)
LACTATE SERPL-SCNC: 1.5 MMOL/L (ref 0.5–2.2)
LACTATE SERPL-SCNC: 1.6 MMOL/L (ref 0.5–2.2)
LACTATE SERPL-SCNC: 1.9 MMOL/L (ref 0.5–2.2)
LACTATE SERPL-SCNC: 2.4 MMOL/L (ref 0.5–2.2)
LACTATE SERPL-SCNC: 2.4 MMOL/L (ref 0.5–2.2)
LACTATE SERPL-SCNC: 2.8 MMOL/L (ref 0.5–2.2)
LACTATE SERPL-SCNC: 2.9 MMOL/L (ref 0.5–2.2)
LACTATE SERPL-SCNC: 3 MMOL/L (ref 0.5–2.2)
LACTATE SERPL-SCNC: 6.9 MMOL/L (ref 0.5–2.2)
LDH SERPL L TO P-CCNC: 450 U/L (ref 110–260)
LDH SERPL L TO P-CCNC: 470 U/L (ref 110–260)
LDH SERPL L TO P-CCNC: 501 U/L (ref 110–260)
LDH SERPL L TO P-CCNC: 563 U/L (ref 110–260)
LDH SERPL L TO P-CCNC: 641 U/L (ref 110–260)
LDLC SERPL CALC-MCNC: 80.4 MG/DL (ref 63–159)
LEFT ATRIUM SIZE: 2.32 CM
LEFT ATRIUM SIZE: 2.75 CM
LEFT ATRIUM SIZE: 3 CM
LEFT ATRIUM VOLUME INDEX: 24.8 ML/M2
LEFT ATRIUM VOLUME INDEX: 9.3 ML/M2
LEFT ATRIUM VOLUME: 16.28 CM3
LEFT ATRIUM VOLUME: 28.4 CM3
LEFT ATRIUM VOLUME: 43.27 CM3
LEFT INTERNAL DIMENSION IN SYSTOLE: 2.87 CM (ref 2.1–4)
LEFT INTERNAL DIMENSION IN SYSTOLE: 3.16 CM (ref 2.1–4)
LEFT INTERNAL DIMENSION IN SYSTOLE: 3.65 CM (ref 2.1–4)
LEFT VENTRICLE DIASTOLIC VOLUME INDEX: 56.26 ML/M2
LEFT VENTRICLE DIASTOLIC VOLUME INDEX: 78.06 ML/M2
LEFT VENTRICLE DIASTOLIC VOLUME: 136.12 ML
LEFT VENTRICLE DIASTOLIC VOLUME: 83.91 ML
LEFT VENTRICLE DIASTOLIC VOLUME: 98.68 ML
LEFT VENTRICLE MASS INDEX: 63 G/M2
LEFT VENTRICLE MASS INDEX: 73 G/M2
LEFT VENTRICLE SYSTOLIC VOLUME INDEX: 22.8 ML/M2
LEFT VENTRICLE SYSTOLIC VOLUME INDEX: 32.1 ML/M2
LEFT VENTRICLE SYSTOLIC VOLUME: 31.29 ML
LEFT VENTRICLE SYSTOLIC VOLUME: 39.8 ML
LEFT VENTRICLE SYSTOLIC VOLUME: 56.33 ML
LEFT VENTRICULAR INTERNAL DIMENSION IN DIASTOLE: 4.32 CM (ref 3.5–6)
LEFT VENTRICULAR INTERNAL DIMENSION IN DIASTOLE: 4.63 CM (ref 3.5–6)
LEFT VENTRICULAR INTERNAL DIMENSION IN DIASTOLE: 5.31 CM (ref 3.5–6)
LEFT VENTRICULAR MASS: 109.83 G
LEFT VENTRICULAR MASS: 128.05 G
LEFT VENTRICULAR MASS: 171.63 G
LEUKOCYTE ESTERASE UR QL STRIP: ABNORMAL
LEUKOCYTE ESTERASE UR QL STRIP: NEGATIVE
LIPASE SERPL-CCNC: 16 U/L (ref 4–60)
LIPASE SERPL-CCNC: 18 U/L (ref 4–60)
LIPASE SERPL-CCNC: 24 U/L (ref 4–60)
LIPASE SERPL-CCNC: 27 U/L (ref 4–60)
LV LATERAL E/E' RATIO: 5.45
LV LATERAL E/E' RATIO: 7.22 M/S
LV LATERAL E/E' RATIO: 7.22 M/S
LV SEPTAL E/E' RATIO: 10.9
LV SEPTAL E/E' RATIO: 13 M/S
LV SEPTAL E/E' RATIO: 16.25 M/S
LYMPHOCYTES # BLD AUTO: 1 K/UL (ref 1–4.8)
LYMPHOCYTES # BLD AUTO: 1.1 K/UL (ref 1–4.8)
LYMPHOCYTES # BLD AUTO: 1.3 K/UL (ref 1–4.8)
LYMPHOCYTES # BLD AUTO: 1.4 K/UL (ref 1–4.8)
LYMPHOCYTES # BLD AUTO: 1.6 K/UL (ref 1–4.8)
LYMPHOCYTES # BLD AUTO: 1.9 K/UL (ref 1–4.8)
LYMPHOCYTES # BLD AUTO: 2.2 K/UL (ref 1–4.8)
LYMPHOCYTES # BLD AUTO: 2.3 K/UL (ref 1–4.8)
LYMPHOCYTES # BLD AUTO: 2.3 K/UL (ref 1–4.8)
LYMPHOCYTES # BLD AUTO: 2.4 K/UL (ref 1–4.8)
LYMPHOCYTES # BLD AUTO: 2.5 K/UL (ref 1–4.8)
LYMPHOCYTES # BLD AUTO: 2.7 K/UL (ref 1–4.8)
LYMPHOCYTES # BLD AUTO: 2.8 K/UL (ref 1–4.8)
LYMPHOCYTES # BLD AUTO: 3.2 K/UL (ref 1–4.8)
LYMPHOCYTES # BLD AUTO: 3.2 K/UL (ref 1–4.8)
LYMPHOCYTES # BLD AUTO: 3.3 K/UL (ref 1–4.8)
LYMPHOCYTES # BLD AUTO: 3.4 K/UL (ref 1–4.8)
LYMPHOCYTES # BLD AUTO: ABNORMAL K/UL (ref 1–4.8)
LYMPHOCYTES NFR BLD: 12.5 % (ref 18–48)
LYMPHOCYTES NFR BLD: 13.6 % (ref 18–48)
LYMPHOCYTES NFR BLD: 14.3 % (ref 18–48)
LYMPHOCYTES NFR BLD: 14.6 % (ref 18–48)
LYMPHOCYTES NFR BLD: 16.4 % (ref 18–48)
LYMPHOCYTES NFR BLD: 18.5 % (ref 18–48)
LYMPHOCYTES NFR BLD: 26.1 % (ref 18–48)
LYMPHOCYTES NFR BLD: 3.9 % (ref 18–48)
LYMPHOCYTES NFR BLD: 30.8 % (ref 18–48)
LYMPHOCYTES NFR BLD: 4 % (ref 18–48)
LYMPHOCYTES NFR BLD: 4.4 % (ref 18–48)
LYMPHOCYTES NFR BLD: 4.9 % (ref 18–48)
LYMPHOCYTES NFR BLD: 5.2 % (ref 18–48)
LYMPHOCYTES NFR BLD: 5.9 % (ref 18–48)
LYMPHOCYTES NFR BLD: 6.8 % (ref 18–48)
LYMPHOCYTES NFR BLD: 6.9 % (ref 18–48)
LYMPHOCYTES NFR BLD: 7 % (ref 18–48)
LYMPHOCYTES NFR BLD: 7.1 % (ref 18–48)
LYMPHOCYTES NFR BLD: 8.8 % (ref 18–48)
LYMPHOCYTES NFR BLD: 8.9 % (ref 18–48)
LYMPHOCYTES NFR BLD: 9 % (ref 18–48)
LYMPHOCYTES NFR BLD: 9.3 % (ref 18–48)
LYMPHOCYTES NFR BLD: 9.6 % (ref 18–48)
LYMPHOCYTES NFR BLD: 9.7 % (ref 18–48)
LYMPHOCYTES NFR BLD: 9.8 % (ref 18–48)
MAGNESIUM SERPL-MCNC: 1.5 MG/DL (ref 1.6–2.6)
MAGNESIUM SERPL-MCNC: 1.6 MG/DL (ref 1.6–2.6)
MAGNESIUM SERPL-MCNC: 1.6 MG/DL (ref 1.6–2.6)
MAGNESIUM SERPL-MCNC: 1.7 MG/DL (ref 1.6–2.6)
MAGNESIUM SERPL-MCNC: 1.7 MG/DL (ref 1.6–2.6)
MAGNESIUM SERPL-MCNC: 1.8 MG/DL (ref 1.6–2.6)
MAGNESIUM SERPL-MCNC: 2.2 MG/DL (ref 1.6–2.6)
MAGNESIUM SERPL-MCNC: 2.3 MG/DL (ref 1.6–2.6)
MAGNESIUM SERPL-MCNC: 2.4 MG/DL (ref 1.6–2.6)
MCH RBC QN AUTO: 30.3 PG (ref 27–31)
MCH RBC QN AUTO: 30.3 PG (ref 27–31)
MCH RBC QN AUTO: 30.4 PG (ref 27–31)
MCH RBC QN AUTO: 30.4 PG (ref 27–31)
MCH RBC QN AUTO: 30.6 PG (ref 27–31)
MCH RBC QN AUTO: 30.7 PG (ref 27–31)
MCH RBC QN AUTO: 30.7 PG (ref 27–31)
MCH RBC QN AUTO: 30.8 PG (ref 27–31)
MCH RBC QN AUTO: 30.9 PG (ref 27–31)
MCH RBC QN AUTO: 31 PG (ref 27–31)
MCH RBC QN AUTO: 31.1 PG (ref 27–31)
MCH RBC QN AUTO: 31.1 PG (ref 27–31)
MCH RBC QN AUTO: 31.4 PG (ref 27–31)
MCH RBC QN AUTO: 31.6 PG (ref 27–31)
MCH RBC QN AUTO: 31.7 PG (ref 27–31)
MCH RBC QN AUTO: 34.1 PG (ref 27–31)
MCHC RBC AUTO-ENTMCNC: 32 G/DL (ref 32–36)
MCHC RBC AUTO-ENTMCNC: 32.3 G/DL (ref 32–36)
MCHC RBC AUTO-ENTMCNC: 32.6 G/DL (ref 32–36)
MCHC RBC AUTO-ENTMCNC: 32.7 G/DL (ref 32–36)
MCHC RBC AUTO-ENTMCNC: 32.8 G/DL (ref 32–36)
MCHC RBC AUTO-ENTMCNC: 33 G/DL (ref 32–36)
MCHC RBC AUTO-ENTMCNC: 33 G/DL (ref 32–36)
MCHC RBC AUTO-ENTMCNC: 33.1 G/DL (ref 32–36)
MCHC RBC AUTO-ENTMCNC: 33.2 G/DL (ref 32–36)
MCHC RBC AUTO-ENTMCNC: 33.4 G/DL (ref 32–36)
MCHC RBC AUTO-ENTMCNC: 33.5 G/DL (ref 32–36)
MCHC RBC AUTO-ENTMCNC: 33.5 G/DL (ref 32–36)
MCHC RBC AUTO-ENTMCNC: 33.8 G/DL (ref 32–36)
MCHC RBC AUTO-ENTMCNC: 33.8 G/DL (ref 32–36)
MCHC RBC AUTO-ENTMCNC: 34 G/DL (ref 32–36)
MCHC RBC AUTO-ENTMCNC: 34.1 G/DL (ref 32–36)
MCHC RBC AUTO-ENTMCNC: 34.2 G/DL (ref 32–36)
MCHC RBC AUTO-ENTMCNC: 34.3 G/DL (ref 32–36)
MCHC RBC AUTO-ENTMCNC: 34.5 G/DL (ref 32–36)
MCHC RBC AUTO-ENTMCNC: 34.6 G/DL (ref 32–36)
MCHC RBC AUTO-ENTMCNC: 37.4 G/DL (ref 32–36)
MCV RBC AUTO: 88 FL (ref 82–98)
MCV RBC AUTO: 89 FL (ref 82–98)
MCV RBC AUTO: 90 FL (ref 82–98)
MCV RBC AUTO: 91 FL (ref 82–98)
MCV RBC AUTO: 92 FL (ref 82–98)
MCV RBC AUTO: 93 FL (ref 82–98)
MCV RBC AUTO: 94 FL (ref 82–98)
MCV RBC AUTO: 95 FL (ref 82–98)
MCV RBC AUTO: 98 FL (ref 82–98)
MICROSCOPIC COMMENT: ABNORMAL
MICROSCOPIC COMMENT: NORMAL
MICROSCOPIC COMMENT: NORMAL
MIN VOL: 10.6
MIN VOL: 12.8
MIN VOL: 12.8
MIN VOL: 12.9
MIN VOL: 13
MIN VOL: 13
MIN VOL: 15.6
MIN VOL: 16.2
MIN VOL: 16.3
MODE: ABNORMAL
MONOCYTES # BLD AUTO: 0.9 K/UL (ref 0.3–1)
MONOCYTES # BLD AUTO: 1.1 K/UL (ref 0.3–1)
MONOCYTES # BLD AUTO: 1.2 K/UL (ref 0.3–1)
MONOCYTES # BLD AUTO: 1.2 K/UL (ref 0.3–1)
MONOCYTES # BLD AUTO: 1.4 K/UL (ref 0.3–1)
MONOCYTES # BLD AUTO: 1.5 K/UL (ref 0.3–1)
MONOCYTES # BLD AUTO: 1.5 K/UL (ref 0.3–1)
MONOCYTES # BLD AUTO: 1.6 K/UL (ref 0.3–1)
MONOCYTES # BLD AUTO: 1.7 K/UL (ref 0.3–1)
MONOCYTES # BLD AUTO: 1.8 K/UL (ref 0.3–1)
MONOCYTES # BLD AUTO: 2 K/UL (ref 0.3–1)
MONOCYTES # BLD AUTO: 2.1 K/UL (ref 0.3–1)
MONOCYTES # BLD AUTO: 2.1 K/UL (ref 0.3–1)
MONOCYTES # BLD AUTO: 2.2 K/UL (ref 0.3–1)
MONOCYTES # BLD AUTO: 2.3 K/UL (ref 0.3–1)
MONOCYTES # BLD AUTO: 2.3 K/UL (ref 0.3–1)
MONOCYTES # BLD AUTO: 2.5 K/UL (ref 0.3–1)
MONOCYTES # BLD AUTO: 2.5 K/UL (ref 0.3–1)
MONOCYTES # BLD AUTO: 2.8 K/UL (ref 0.3–1)
MONOCYTES # BLD AUTO: ABNORMAL K/UL (ref 0.3–1)
MONOCYTES NFR BLD: 10.6 % (ref 4–15)
MONOCYTES NFR BLD: 11.2 % (ref 4–15)
MONOCYTES NFR BLD: 11.2 % (ref 4–15)
MONOCYTES NFR BLD: 11.7 % (ref 4–15)
MONOCYTES NFR BLD: 12.2 % (ref 4–15)
MONOCYTES NFR BLD: 12.4 % (ref 4–15)
MONOCYTES NFR BLD: 15.3 % (ref 4–15)
MONOCYTES NFR BLD: 4.1 % (ref 4–15)
MONOCYTES NFR BLD: 5.2 % (ref 4–15)
MONOCYTES NFR BLD: 6 % (ref 4–15)
MONOCYTES NFR BLD: 6 % (ref 4–15)
MONOCYTES NFR BLD: 6.1 % (ref 4–15)
MONOCYTES NFR BLD: 6.5 % (ref 4–15)
MONOCYTES NFR BLD: 6.7 % (ref 4–15)
MONOCYTES NFR BLD: 7 % (ref 4–15)
MONOCYTES NFR BLD: 7.1 % (ref 4–15)
MONOCYTES NFR BLD: 7.1 % (ref 4–15)
MONOCYTES NFR BLD: 7.2 % (ref 4–15)
MONOCYTES NFR BLD: 7.3 % (ref 4–15)
MONOCYTES NFR BLD: 7.5 % (ref 4–15)
MONOCYTES NFR BLD: 8.4 % (ref 4–15)
MONOCYTES NFR BLD: 9 % (ref 4–15)
MONOCYTES NFR BLD: 9.1 % (ref 4–15)
MONOCYTES NFR BLD: 9.4 % (ref 4–15)
MONOCYTES NFR BLD: 9.8 % (ref 4–15)
MV PEAK A VEL: 0.4 M/S
MV PEAK A VEL: 0.52 M/S
MV PEAK A VEL: 0.53 M/S
MV PEAK E VEL: 0.65 M/S
MV PEAK E VEL: 0.65 M/S
MV PEAK E VEL: 1.09 M/S
N GONORRHOEA DNA SPEC QL NAA+PROBE: NOT DETECTED
NEUTROPHILS # BLD AUTO: 11.4 K/UL (ref 1.8–7.7)
NEUTROPHILS # BLD AUTO: 11.7 K/UL (ref 1.8–7.7)
NEUTROPHILS # BLD AUTO: 11.9 K/UL (ref 1.8–7.7)
NEUTROPHILS # BLD AUTO: 12.1 K/UL (ref 1.8–7.7)
NEUTROPHILS # BLD AUTO: 13 K/UL (ref 1.8–7.7)
NEUTROPHILS # BLD AUTO: 13.1 K/UL (ref 1.8–7.7)
NEUTROPHILS # BLD AUTO: 15.7 K/UL (ref 1.8–7.7)
NEUTROPHILS # BLD AUTO: 18.6 K/UL (ref 1.8–7.7)
NEUTROPHILS # BLD AUTO: 19.7 K/UL (ref 1.8–7.7)
NEUTROPHILS # BLD AUTO: 23.3 K/UL (ref 1.8–7.7)
NEUTROPHILS # BLD AUTO: 24.7 K/UL (ref 1.8–7.7)
NEUTROPHILS # BLD AUTO: 27.4 K/UL (ref 1.8–7.7)
NEUTROPHILS # BLD AUTO: 27.8 K/UL (ref 1.8–7.7)
NEUTROPHILS # BLD AUTO: 27.8 K/UL (ref 1.8–7.7)
NEUTROPHILS # BLD AUTO: 28.6 K/UL (ref 1.8–7.7)
NEUTROPHILS # BLD AUTO: 33.3 K/UL (ref 1.8–7.7)
NEUTROPHILS # BLD AUTO: 36.1 K/UL (ref 1.8–7.7)
NEUTROPHILS # BLD AUTO: 5.8 K/UL (ref 1.8–7.7)
NEUTROPHILS # BLD AUTO: 7.5 K/UL (ref 1.8–7.7)
NEUTROPHILS NFR BLD: 52.3 % (ref 38–73)
NEUTROPHILS NFR BLD: 58.8 % (ref 38–73)
NEUTROPHILS NFR BLD: 67.3 % (ref 38–73)
NEUTROPHILS NFR BLD: 70.6 % (ref 38–73)
NEUTROPHILS NFR BLD: 73.5 % (ref 38–73)
NEUTROPHILS NFR BLD: 77.6 % (ref 38–73)
NEUTROPHILS NFR BLD: 78 % (ref 38–73)
NEUTROPHILS NFR BLD: 78 % (ref 38–73)
NEUTROPHILS NFR BLD: 78.8 % (ref 38–73)
NEUTROPHILS NFR BLD: 78.9 % (ref 38–73)
NEUTROPHILS NFR BLD: 79.7 % (ref 38–73)
NEUTROPHILS NFR BLD: 80.1 % (ref 38–73)
NEUTROPHILS NFR BLD: 82 % (ref 38–73)
NEUTROPHILS NFR BLD: 83.2 % (ref 38–73)
NEUTROPHILS NFR BLD: 83.3 % (ref 38–73)
NEUTROPHILS NFR BLD: 83.3 % (ref 38–73)
NEUTROPHILS NFR BLD: 83.5 % (ref 38–73)
NEUTROPHILS NFR BLD: 84.5 % (ref 38–73)
NEUTROPHILS NFR BLD: 86.3 % (ref 38–73)
NEUTROPHILS NFR BLD: 87.4 % (ref 38–73)
NEUTROPHILS NFR BLD: 88.1 % (ref 38–73)
NEUTROPHILS NFR BLD: 89.5 % (ref 38–73)
NEUTROPHILS NFR BLD: 89.8 % (ref 38–73)
NEUTROPHILS NFR BLD: 90.4 % (ref 38–73)
NEUTROPHILS NFR BLD: 91.9 % (ref 38–73)
NEUTS BAND NFR BLD MANUAL: 4 %
NEUTS BAND NFR BLD MANUAL: 5 %
NITRITE UR QL STRIP: NEGATIVE
NONHDLC SERPL-MCNC: 113 MG/DL
NRBC BLD-RTO: 0 /100 WBC
OVALOCYTES BLD QL SMEAR: ABNORMAL
OVALOCYTES BLD QL SMEAR: ABNORMAL
PCO2 BLDA: 27.3 MMHG (ref 35–45)
PCO2 BLDA: 30.2 MMHG (ref 35–45)
PCO2 BLDA: 30.9 MMHG (ref 35–45)
PCO2 BLDA: 31.9 MMHG (ref 35–45)
PCO2 BLDA: 32.4 MMHG (ref 35–45)
PCO2 BLDA: 32.7 MMHG (ref 35–45)
PCO2 BLDA: 33.8 MMHG (ref 35–45)
PCO2 BLDA: 34.1 MMHG (ref 35–45)
PCO2 BLDA: 34.3 MMHG (ref 35–45)
PCO2 BLDA: 37 MMHG (ref 35–45)
PCO2 BLDA: 39.1 MMHG (ref 35–45)
PCO2 BLDA: 40.8 MMHG (ref 35–45)
PCO2 BLDA: 41.8 MMHG (ref 35–45)
PCO2 BLDA: 56.4 MMHG (ref 35–45)
PCO2 BLDA: 91.6 MMHG (ref 35–45)
PEEP: 5
PEEP: 8
PH SMN: 7.07 [PH] (ref 7.35–7.45)
PH SMN: 7.2 [PH] (ref 7.35–7.45)
PH SMN: 7.28 [PH] (ref 7.35–7.45)
PH SMN: 7.3 [PH] (ref 7.35–7.45)
PH SMN: 7.3 [PH] (ref 7.35–7.45)
PH SMN: 7.32 [PH] (ref 7.35–7.45)
PH SMN: 7.34 [PH] (ref 7.35–7.45)
PH SMN: 7.35 [PH] (ref 7.35–7.45)
PH SMN: 7.36 [PH] (ref 7.35–7.45)
PH SMN: 7.42 [PH] (ref 7.35–7.45)
PH SMN: 7.42 [PH] (ref 7.35–7.45)
PH SMN: 7.46 [PH] (ref 7.35–7.45)
PH SMN: 7.52 [PH] (ref 7.35–7.45)
PH UR STRIP: 5 [PH] (ref 5–8)
PH UR STRIP: 5 [PH] (ref 5–8)
PH UR STRIP: 6 [PH] (ref 5–8)
PH UR STRIP: 7 [PH] (ref 5–8)
PHOSPHATE SERPL-MCNC: 2.6 MG/DL (ref 2.7–4.5)
PHOSPHATE SERPL-MCNC: 2.8 MG/DL (ref 2.7–4.5)
PHOSPHATE SERPL-MCNC: 3 MG/DL (ref 2.7–4.5)
PHOSPHATE SERPL-MCNC: 3.3 MG/DL (ref 2.7–4.5)
PHOSPHATE SERPL-MCNC: 3.3 MG/DL (ref 2.7–4.5)
PHOSPHATE SERPL-MCNC: 3.4 MG/DL (ref 2.7–4.5)
PHOSPHATE SERPL-MCNC: 3.6 MG/DL (ref 2.7–4.5)
PHOSPHATE SERPL-MCNC: 3.8 MG/DL (ref 2.7–4.5)
PHOSPHATE SERPL-MCNC: 3.8 MG/DL (ref 2.7–4.5)
PHOSPHATE SERPL-MCNC: 4 MG/DL (ref 2.7–4.5)
PHOSPHATE SERPL-MCNC: 4.1 MG/DL (ref 2.7–4.5)
PIP: 14
PIP: 20
PIP: 20
PIP: 21
PIP: 22
PIP: 22
PIP: 23
PIP: 23
PIP: 25
PISA TR MAX VEL: 2.34 M/S
PISA TR MAX VEL: 2.75 M/S
PLATELET # BLD AUTO: 175 K/UL (ref 150–350)
PLATELET # BLD AUTO: 179 K/UL (ref 150–350)
PLATELET # BLD AUTO: 193 K/UL (ref 150–350)
PLATELET # BLD AUTO: 199 K/UL (ref 150–350)
PLATELET # BLD AUTO: 205 K/UL (ref 150–350)
PLATELET # BLD AUTO: 210 K/UL (ref 150–350)
PLATELET # BLD AUTO: 217 K/UL (ref 150–350)
PLATELET # BLD AUTO: 244 K/UL (ref 150–350)
PLATELET # BLD AUTO: 253 K/UL (ref 150–350)
PLATELET # BLD AUTO: 264 K/UL (ref 150–350)
PLATELET # BLD AUTO: 275 K/UL (ref 150–350)
PLATELET # BLD AUTO: 294 K/UL (ref 150–350)
PLATELET # BLD AUTO: 294 K/UL (ref 150–350)
PLATELET # BLD AUTO: 300 K/UL (ref 150–350)
PLATELET # BLD AUTO: 314 K/UL (ref 150–350)
PLATELET # BLD AUTO: 327 K/UL (ref 150–350)
PLATELET # BLD AUTO: 328 K/UL (ref 150–350)
PLATELET # BLD AUTO: 328 K/UL (ref 150–350)
PLATELET # BLD AUTO: 334 K/UL (ref 150–350)
PLATELET # BLD AUTO: 339 K/UL (ref 150–350)
PLATELET # BLD AUTO: 342 K/UL (ref 150–350)
PLATELET # BLD AUTO: 362 K/UL (ref 150–350)
PLATELET # BLD AUTO: 371 K/UL (ref 150–350)
PLATELET # BLD AUTO: 385 K/UL (ref 150–350)
PLATELET # BLD AUTO: 392 K/UL (ref 150–350)
PLATELET BLD QL SMEAR: ABNORMAL
PMV BLD AUTO: 10.2 FL (ref 9.2–12.9)
PMV BLD AUTO: 10.2 FL (ref 9.2–12.9)
PMV BLD AUTO: 10.3 FL (ref 9.2–12.9)
PMV BLD AUTO: 10.4 FL (ref 9.2–12.9)
PMV BLD AUTO: 10.7 FL (ref 9.2–12.9)
PMV BLD AUTO: 10.8 FL (ref 9.2–12.9)
PMV BLD AUTO: 7.7 FL (ref 9.2–12.9)
PMV BLD AUTO: 8.3 FL (ref 9.2–12.9)
PMV BLD AUTO: 8.3 FL (ref 9.2–12.9)
PMV BLD AUTO: 8.7 FL (ref 9.2–12.9)
PMV BLD AUTO: 8.9 FL (ref 9.2–12.9)
PMV BLD AUTO: 9 FL (ref 9.2–12.9)
PMV BLD AUTO: 9.1 FL (ref 9.2–12.9)
PMV BLD AUTO: 9.1 FL (ref 9.2–12.9)
PMV BLD AUTO: 9.2 FL (ref 9.2–12.9)
PMV BLD AUTO: 9.2 FL (ref 9.2–12.9)
PMV BLD AUTO: 9.3 FL (ref 9.2–12.9)
PMV BLD AUTO: 9.4 FL (ref 9.2–12.9)
PMV BLD AUTO: 9.7 FL (ref 9.2–12.9)
PO2 BLDA: 134 MMHG (ref 80–100)
PO2 BLDA: 135 MMHG (ref 80–100)
PO2 BLDA: 150 MMHG (ref 80–100)
PO2 BLDA: 228 MMHG (ref 80–100)
PO2 BLDA: 287 MMHG (ref 80–100)
PO2 BLDA: 290 MMHG (ref 80–100)
PO2 BLDA: 326 MMHG (ref 80–100)
PO2 BLDA: 40 MMHG (ref 80–100)
PO2 BLDA: 516 MMHG (ref 80–100)
PO2 BLDA: 544 MMHG (ref 80–100)
PO2 BLDA: 578 MMHG (ref 80–100)
PO2 BLDA: 579 MMHG (ref 80–100)
PO2 BLDA: 588 MMHG (ref 80–100)
PO2 BLDA: 592 MMHG (ref 80–100)
PO2 BLDA: 88 MMHG (ref 40–60)
POC BE: -11 MMOL/L
POC BE: -3 MMOL/L
POC BE: -4 MMOL/L
POC BE: -5 MMOL/L
POC BE: -5 MMOL/L
POC BE: -6 MMOL/L
POC BE: -6 MMOL/L
POC BE: -7 MMOL/L
POC BE: -8 MMOL/L
POC BE: 2 MMOL/L
POC SATURATED O2: 100 % (ref 95–100)
POC SATURATED O2: 82 % (ref 95–100)
POC SATURATED O2: 97 % (ref 95–100)
POC SATURATED O2: 97 % (ref 95–100)
POC SATURATED O2: 99 % (ref 95–100)
POC SATURATED O2: 99 % (ref 95–100)
POC TCO2: 16 MMOL/L (ref 23–27)
POC TCO2: 18 MMOL/L (ref 23–27)
POC TCO2: 19 MMOL/L (ref 23–27)
POC TCO2: 20 MMOL/L (ref 23–27)
POC TCO2: 20 MMOL/L (ref 24–29)
POC TCO2: 22 MMOL/L (ref 23–27)
POC TCO2: 24 MMOL/L (ref 23–27)
POC TCO2: 24 MMOL/L (ref 23–27)
POC TCO2: 26 MMOL/L (ref 23–27)
POC TCO2: 29 MMOL/L (ref 23–27)
POCT GLUCOSE: 102 MG/DL (ref 70–110)
POCT GLUCOSE: 138 MG/DL (ref 70–110)
POCT GLUCOSE: 163 MG/DL (ref 70–110)
POCT GLUCOSE: 181 MG/DL (ref 70–110)
POCT GLUCOSE: 63 MG/DL (ref 70–110)
POCT GLUCOSE: 93 MG/DL (ref 70–110)
POIKILOCYTOSIS BLD QL SMEAR: SLIGHT
POIKILOCYTOSIS BLD QL SMEAR: SLIGHT
POLYCHROMASIA BLD QL SMEAR: ABNORMAL
POLYCHROMASIA BLD QL SMEAR: ABNORMAL
POTASSIUM SERPL-SCNC: 3 MMOL/L (ref 3.5–5.1)
POTASSIUM SERPL-SCNC: 3 MMOL/L (ref 3.5–5.1)
POTASSIUM SERPL-SCNC: 3.2 MMOL/L (ref 3.5–5.1)
POTASSIUM SERPL-SCNC: 3.3 MMOL/L (ref 3.5–5.1)
POTASSIUM SERPL-SCNC: 3.3 MMOL/L (ref 3.5–5.1)
POTASSIUM SERPL-SCNC: 3.4 MMOL/L (ref 3.5–5.1)
POTASSIUM SERPL-SCNC: 3.4 MMOL/L (ref 3.5–5.1)
POTASSIUM SERPL-SCNC: 3.5 MMOL/L (ref 3.5–5.1)
POTASSIUM SERPL-SCNC: 3.6 MMOL/L (ref 3.5–5.1)
POTASSIUM SERPL-SCNC: 3.7 MMOL/L (ref 3.5–5.1)
POTASSIUM SERPL-SCNC: 3.8 MMOL/L (ref 3.5–5.1)
POTASSIUM SERPL-SCNC: 3.9 MMOL/L (ref 3.5–5.1)
POTASSIUM SERPL-SCNC: 4 MMOL/L (ref 3.5–5.1)
POTASSIUM SERPL-SCNC: 4.1 MMOL/L (ref 3.5–5.1)
POTASSIUM SERPL-SCNC: 4.2 MMOL/L (ref 3.5–5.1)
POTASSIUM SERPL-SCNC: 4.2 MMOL/L (ref 3.5–5.1)
POTASSIUM SERPL-SCNC: 4.4 MMOL/L (ref 3.5–5.1)
POTASSIUM SERPL-SCNC: 4.5 MMOL/L (ref 3.5–5.1)
POTASSIUM SERPL-SCNC: 5.6 MMOL/L (ref 3.5–5.1)
PROCALCITONIN SERPL IA-MCNC: 0.02 NG/ML
PROCALCITONIN SERPL IA-MCNC: 0.03 NG/ML
PROT SERPL-MCNC: 5.2 G/DL (ref 6–8.4)
PROT SERPL-MCNC: 5.5 G/DL (ref 6–8.4)
PROT SERPL-MCNC: 6 G/DL (ref 6–8.4)
PROT SERPL-MCNC: 6.2 G/DL (ref 6–8.4)
PROT SERPL-MCNC: 6.6 G/DL (ref 6–8.4)
PROT SERPL-MCNC: 6.6 G/DL (ref 6–8.4)
PROT SERPL-MCNC: 6.7 G/DL (ref 6–8.4)
PROT SERPL-MCNC: 6.7 G/DL (ref 6–8.4)
PROT SERPL-MCNC: 6.8 G/DL (ref 6–8.4)
PROT SERPL-MCNC: 7 G/DL (ref 6–8.4)
PROT SERPL-MCNC: 7 G/DL (ref 6–8.4)
PROT SERPL-MCNC: 7.1 G/DL (ref 6–8.4)
PROT SERPL-MCNC: 7.4 G/DL (ref 6–8.4)
PROT SERPL-MCNC: 7.5 G/DL (ref 6–8.4)
PROT SERPL-MCNC: 7.6 G/DL (ref 6–8.4)
PROT UR QL STRIP: ABNORMAL
PROT UR QL STRIP: NEGATIVE
PROTHROMBIN TIME: 10.6 SEC (ref 9–12.5)
PROTHROMBIN TIME: 10.7 SEC (ref 9–12.5)
PROTHROMBIN TIME: 11.1 SEC (ref 9–12.5)
PROTHROMBIN TIME: 11.1 SEC (ref 9–12.5)
PROTHROMBIN TIME: 11.2 SEC (ref 9–12.5)
PROTHROMBIN TIME: 11.3 SEC (ref 9–12.5)
PROTHROMBIN TIME: 11.3 SEC (ref 9–12.5)
PROTHROMBIN TIME: 11.4 SEC (ref 9–12.5)
PROTHROMBIN TIME: 11.4 SEC (ref 9–12.5)
PROTHROMBIN TIME: 11.7 SEC (ref 9–12.5)
PULM VEIN S/D RATIO: 1.15
PULM VEIN S/D RATIO: 1.24
PV PEAK D VEL: 0.37 M/S
PV PEAK D VEL: 0.65 M/S
PV PEAK S VEL: 0.46 M/S
PV PEAK S VEL: 0.75 M/S
PV PEAK VELOCITY: 0.84 CM/S
PV PEAK VELOCITY: 1.06 CM/S
RA MAJOR: 3.48 CM
RA MAJOR: 4.26 CM
RA MAJOR: 4.65 CM
RA PRESSURE: 3 MMHG
RA PRESSURE: 3 MMHG
RA PRESSURE: 8 MMHG
RA WIDTH: 2.14 CM
RA WIDTH: 2.86 CM
RA WIDTH: 2.86 CM
RBC # BLD AUTO: 4.24 M/UL (ref 4.6–6.2)
RBC # BLD AUTO: 4.34 M/UL (ref 4.6–6.2)
RBC # BLD AUTO: 4.36 M/UL (ref 4.6–6.2)
RBC # BLD AUTO: 4.47 M/UL (ref 4.6–6.2)
RBC # BLD AUTO: 4.48 M/UL (ref 4.6–6.2)
RBC # BLD AUTO: 4.62 M/UL (ref 4.6–6.2)
RBC # BLD AUTO: 4.63 M/UL (ref 4.6–6.2)
RBC # BLD AUTO: 4.72 M/UL (ref 4.6–6.2)
RBC # BLD AUTO: 4.75 M/UL (ref 4.6–6.2)
RBC # BLD AUTO: 4.81 M/UL (ref 4.6–6.2)
RBC # BLD AUTO: 4.84 M/UL (ref 4.6–6.2)
RBC # BLD AUTO: 4.89 M/UL (ref 4.6–6.2)
RBC # BLD AUTO: 4.94 M/UL (ref 4.6–6.2)
RBC # BLD AUTO: 5.13 M/UL (ref 4.6–6.2)
RBC # BLD AUTO: 5.17 M/UL (ref 4.6–6.2)
RBC # BLD AUTO: 5.19 M/UL (ref 4.6–6.2)
RBC # BLD AUTO: 5.22 M/UL (ref 4.6–6.2)
RBC # BLD AUTO: 5.24 M/UL (ref 4.6–6.2)
RBC # BLD AUTO: 5.3 M/UL (ref 4.6–6.2)
RBC # BLD AUTO: 5.36 M/UL (ref 4.6–6.2)
RBC # BLD AUTO: 5.4 M/UL (ref 4.6–6.2)
RBC # BLD AUTO: 5.44 M/UL (ref 4.6–6.2)
RBC # BLD AUTO: 5.48 M/UL (ref 4.6–6.2)
RBC # BLD AUTO: 5.54 M/UL (ref 4.6–6.2)
RBC # BLD AUTO: 5.72 M/UL (ref 4.6–6.2)
RBC #/AREA URNS AUTO: 1 /HPF (ref 0–4)
RBC #/AREA URNS AUTO: 8 /HPF (ref 0–4)
RBC #/AREA URNS HPF: 1 /HPF (ref 0–4)
RBC #/AREA URNS HPF: 2 /HPF (ref 0–4)
RBC #/AREA URNS HPF: >100 /HPF (ref 0–4)
RIGHT VENTRICULAR END-DIASTOLIC DIMENSION: 2.54 CM
RIGHT VENTRICULAR END-DIASTOLIC DIMENSION: 2.98 CM
RIGHT VENTRICULAR END-DIASTOLIC DIMENSION: 3.2 CM
RPR SER QL: NORMAL
RV TISSUE DOPPLER FREE WALL SYSTOLIC VELOCITY 1 (APICAL 4 CHAMBER VIEW): 17 M/S
RV TISSUE DOPPLER FREE WALL SYSTOLIC VELOCITY 1 (APICAL 4 CHAMBER VIEW): 8.88 CM/S
RV TISSUE DOPPLER FREE WALL SYSTOLIC VELOCITY 1 (APICAL 4 CHAMBER VIEW): 9.95 CM/S
SAMPLE: ABNORMAL
SINUS: 3.11 CM
SINUS: 3.47 CM
SINUS: 3.48 CM
SITE: ABNORMAL
SODIUM SERPL-SCNC: 136 MMOL/L (ref 136–145)
SODIUM SERPL-SCNC: 137 MMOL/L (ref 136–145)
SODIUM SERPL-SCNC: 138 MMOL/L (ref 136–145)
SODIUM SERPL-SCNC: 139 MMOL/L (ref 136–145)
SODIUM SERPL-SCNC: 140 MMOL/L (ref 136–145)
SODIUM SERPL-SCNC: 141 MMOL/L (ref 136–145)
SODIUM SERPL-SCNC: 142 MMOL/L (ref 136–145)
SODIUM SERPL-SCNC: 143 MMOL/L (ref 136–145)
SODIUM SERPL-SCNC: 144 MMOL/L (ref 136–145)
SODIUM SERPL-SCNC: 144 MMOL/L (ref 136–145)
SODIUM SERPL-SCNC: 145 MMOL/L (ref 136–145)
SODIUM SERPL-SCNC: 145 MMOL/L (ref 136–145)
SODIUM SERPL-SCNC: 146 MMOL/L (ref 136–145)
SODIUM SERPL-SCNC: 147 MMOL/L (ref 136–145)
SODIUM SERPL-SCNC: 148 MMOL/L (ref 136–145)
SODIUM SERPL-SCNC: 150 MMOL/L (ref 136–145)
SODIUM SERPL-SCNC: 151 MMOL/L (ref 136–145)
SODIUM SERPL-SCNC: 152 MMOL/L (ref 136–145)
SODIUM SERPL-SCNC: 154 MMOL/L (ref 136–145)
SODIUM SERPL-SCNC: 155 MMOL/L (ref 136–145)
SODIUM SERPL-SCNC: 156 MMOL/L (ref 136–145)
SODIUM SERPL-SCNC: 156 MMOL/L (ref 136–145)
SP GR UR STRIP: 1 (ref 1–1.03)
SP GR UR STRIP: 1.01 (ref 1–1.03)
SP GR UR STRIP: 1.02 (ref 1–1.03)
SP GR UR STRIP: >=1.03 (ref 1–1.03)
SP02: 100
SP02: 60
SP02: 93
SP02: 95
SP02: 96
SP02: 96
SP02: 97
SP02: 97
SP02: 98
SP02: 98
SP02: 99
SQUAMOUS #/AREA URNS AUTO: 0 /HPF
STJ: 3.1 CM
TDI LATERAL: 0.09 M/S
TDI LATERAL: 0.09 M/S
TDI LATERAL: 0.2
TDI SEPTAL: 0.04 M/S
TDI SEPTAL: 0.05 M/S
TDI SEPTAL: 0.1
TDI: 0.07 M/S
TDI: 0.07 M/S
TDI: 0.15
TOXIC GRANULES BLD QL SMEAR: PRESENT
TR MAX PG: 22 MMHG
TR MAX PG: 30 MMHG
TRANS ERYTHROCYTES VOL PATIENT: NORMAL ML
TRANS ERYTHROCYTES VOL PATIENT: NORMAL ML
TRICUSPID ANNULAR PLANE SYSTOLIC EXCURSION: 1.2 CM
TRICUSPID ANNULAR PLANE SYSTOLIC EXCURSION: 1.4 CM
TRICUSPID ANNULAR PLANE SYSTOLIC EXCURSION: 3.11 CM
TRIGL SERPL-MCNC: 163 MG/DL (ref 30–150)
TROPONIN I SERPL DL<=0.01 NG/ML-MCNC: 0.64 NG/ML (ref 0–0.03)
TROPONIN I SERPL DL<=0.01 NG/ML-MCNC: 0.68 NG/ML (ref 0–0.03)
TROPONIN I SERPL DL<=0.01 NG/ML-MCNC: 0.8 NG/ML (ref 0–0.03)
TROPONIN I SERPL DL<=0.01 NG/ML-MCNC: 1.22 NG/ML (ref 0–0.03)
TSH SERPL DL<=0.005 MIU/L-ACNC: 1.01 UIU/ML (ref 0.4–4)
TV REST PULMONARY ARTERY PRESSURE: 25 MMHG
TV REST PULMONARY ARTERY PRESSURE: 38 MMHG
URATE CRY URNS QL MICRO: NORMAL
URN SPEC COLLECT METH UR: ABNORMAL
UROBILINOGEN UR STRIP-ACNC: NEGATIVE EU/DL
VT: 450
VT: 550
VT: 580
WBC # BLD AUTO: 11.06 K/UL (ref 3.9–12.7)
WBC # BLD AUTO: 12.74 K/UL (ref 3.9–12.7)
WBC # BLD AUTO: 13.7 K/UL (ref 3.9–12.7)
WBC # BLD AUTO: 14.3 K/UL (ref 3.9–12.7)
WBC # BLD AUTO: 14.42 K/UL (ref 3.9–12.7)
WBC # BLD AUTO: 15.5 K/UL (ref 3.9–12.7)
WBC # BLD AUTO: 15.63 K/UL (ref 3.9–12.7)
WBC # BLD AUTO: 16.19 K/UL (ref 3.9–12.7)
WBC # BLD AUTO: 16.43 K/UL (ref 3.9–12.7)
WBC # BLD AUTO: 16.8 K/UL (ref 3.9–12.7)
WBC # BLD AUTO: 16.93 K/UL (ref 3.9–12.7)
WBC # BLD AUTO: 17.42 K/UL (ref 3.9–12.7)
WBC # BLD AUTO: 20 K/UL (ref 3.9–12.7)
WBC # BLD AUTO: 20.2 K/UL (ref 3.9–12.7)
WBC # BLD AUTO: 22.5 K/UL (ref 3.9–12.7)
WBC # BLD AUTO: 24 K/UL (ref 3.9–12.7)
WBC # BLD AUTO: 25.53 K/UL (ref 3.9–12.7)
WBC # BLD AUTO: 25.96 K/UL (ref 3.9–12.7)
WBC # BLD AUTO: 26.9 K/UL (ref 3.9–12.7)
WBC # BLD AUTO: 31.27 K/UL (ref 3.9–12.7)
WBC # BLD AUTO: 32.42 K/UL (ref 3.9–12.7)
WBC # BLD AUTO: 32.94 K/UL (ref 3.9–12.7)
WBC # BLD AUTO: 33.09 K/UL (ref 3.9–12.7)
WBC # BLD AUTO: 37.4 K/UL (ref 3.9–12.7)
WBC # BLD AUTO: 40.7 K/UL (ref 3.9–12.7)
WBC #/AREA URNS AUTO: 1 /HPF (ref 0–5)
WBC #/AREA URNS AUTO: 1 /HPF (ref 0–5)
WBC #/AREA URNS HPF: 1 /HPF (ref 0–5)
WBC #/AREA URNS HPF: 1 /HPF (ref 0–5)
WBC #/AREA URNS HPF: 24 /HPF (ref 0–5)
WBC TOXIC VACUOLES BLD QL SMEAR: PRESENT

## 2019-01-01 PROCEDURE — 84100 ASSAY OF PHOSPHORUS: CPT

## 2019-01-01 PROCEDURE — 85025 COMPLETE CBC W/AUTO DIFF WBC: CPT

## 2019-01-01 PROCEDURE — 25000003 PHARM REV CODE 250: Performed by: PSYCHIATRY & NEUROLOGY

## 2019-01-01 PROCEDURE — 83735 ASSAY OF MAGNESIUM: CPT

## 2019-01-01 PROCEDURE — 99232 SBSQ HOSP IP/OBS MODERATE 35: CPT | Mod: ,,, | Performed by: NURSE PRACTITIONER

## 2019-01-01 PROCEDURE — 97535 SELF CARE MNGMENT TRAINING: CPT

## 2019-01-01 PROCEDURE — 84295 ASSAY OF SERUM SODIUM: CPT | Mod: 91

## 2019-01-01 PROCEDURE — 25000003 PHARM REV CODE 250: Performed by: PHYSICIAN ASSISTANT

## 2019-01-01 PROCEDURE — 27200966 HC CLOSED SUCTION SYSTEM

## 2019-01-01 PROCEDURE — 36000710

## 2019-01-01 PROCEDURE — 82550 ASSAY OF CK (CPK): CPT

## 2019-01-01 PROCEDURE — 99291 PR CRITICAL CARE, E/M 30-74 MINUTES: ICD-10-PCS | Mod: ,,, | Performed by: PHYSICIAN ASSISTANT

## 2019-01-01 PROCEDURE — 87086 URINE CULTURE/COLONY COUNT: CPT

## 2019-01-01 PROCEDURE — 63600175 PHARM REV CODE 636 W HCPCS: Performed by: STUDENT IN AN ORGANIZED HEALTH CARE EDUCATION/TRAINING PROGRAM

## 2019-01-01 PROCEDURE — 63600175 PHARM REV CODE 636 W HCPCS: Performed by: INTERNAL MEDICINE

## 2019-01-01 PROCEDURE — 99291 PR CRITICAL CARE, E/M 30-74 MINUTES: ICD-10-PCS | Mod: ,,, | Performed by: PSYCHIATRY & NEUROLOGY

## 2019-01-01 PROCEDURE — 84132 ASSAY OF SERUM POTASSIUM: CPT

## 2019-01-01 PROCEDURE — 83615 LACTATE (LD) (LDH) ENZYME: CPT | Mod: 91

## 2019-01-01 PROCEDURE — 96366 THER/PROPH/DIAG IV INF ADDON: CPT

## 2019-01-01 PROCEDURE — 99291 PR CRITICAL CARE, E/M 30-74 MINUTES: ICD-10-PCS | Mod: ,,, | Performed by: EMERGENCY MEDICINE

## 2019-01-01 PROCEDURE — 94003 VENT MGMT INPAT SUBQ DAY: CPT

## 2019-01-01 PROCEDURE — 87040 BLOOD CULTURE FOR BACTERIA: CPT | Mod: 59

## 2019-01-01 PROCEDURE — 94667 MNPJ CHEST WALL 1ST: CPT

## 2019-01-01 PROCEDURE — 25500020 PHARM REV CODE 255: Performed by: ANESTHESIOLOGY

## 2019-01-01 PROCEDURE — 82553 CREATINE MB FRACTION: CPT

## 2019-01-01 PROCEDURE — 94761 N-INVAS EAR/PLS OXIMETRY MLT: CPT

## 2019-01-01 PROCEDURE — 63600175 PHARM REV CODE 636 W HCPCS: Performed by: PHYSICIAN ASSISTANT

## 2019-01-01 PROCEDURE — 80053 COMPREHEN METABOLIC PANEL: CPT | Mod: 91

## 2019-01-01 PROCEDURE — 99233 SBSQ HOSP IP/OBS HIGH 50: CPT | Mod: ,,, | Performed by: PSYCHIATRY & NEUROLOGY

## 2019-01-01 PROCEDURE — 99232 PR SUBSEQUENT HOSPITAL CARE,LEVL II: ICD-10-PCS | Mod: ,,, | Performed by: NURSE PRACTITIONER

## 2019-01-01 PROCEDURE — 99900026 HC AIRWAY MAINTENANCE (STAT)

## 2019-01-01 PROCEDURE — A4217 STERILE WATER/SALINE, 500 ML: HCPCS | Performed by: NURSE PRACTITIONER

## 2019-01-01 PROCEDURE — 99232 PR SUBSEQUENT HOSPITAL CARE,LEVL II: ICD-10-PCS | Mod: ,,, | Performed by: PSYCHIATRY & NEUROLOGY

## 2019-01-01 PROCEDURE — 99291 CRITICAL CARE FIRST HOUR: CPT | Mod: ,,, | Performed by: PHYSICIAN ASSISTANT

## 2019-01-01 PROCEDURE — 99900035 HC TECH TIME PER 15 MIN (STAT)

## 2019-01-01 PROCEDURE — 25000003 PHARM REV CODE 250: Performed by: NURSE PRACTITIONER

## 2019-01-01 PROCEDURE — 96365 THER/PROPH/DIAG IV INF INIT: CPT

## 2019-01-01 PROCEDURE — 63600175 PHARM REV CODE 636 W HCPCS

## 2019-01-01 PROCEDURE — 99233 PR SUBSEQUENT HOSPITAL CARE,LEVL III: ICD-10-PCS | Mod: ,,, | Performed by: PSYCHIATRY & NEUROLOGY

## 2019-01-01 PROCEDURE — 85025 COMPLETE CBC W/AUTO DIFF WBC: CPT | Mod: 91

## 2019-01-01 PROCEDURE — 99291 CRITICAL CARE FIRST HOUR: CPT | Mod: ,,, | Performed by: NURSE PRACTITIONER

## 2019-01-01 PROCEDURE — 80048 BASIC METABOLIC PNL TOTAL CA: CPT

## 2019-01-01 PROCEDURE — 80053 COMPREHEN METABOLIC PANEL: CPT

## 2019-01-01 PROCEDURE — 97530 THERAPEUTIC ACTIVITIES: CPT

## 2019-01-01 PROCEDURE — 63600175 PHARM REV CODE 636 W HCPCS: Performed by: NURSE PRACTITIONER

## 2019-01-01 PROCEDURE — 83690 ASSAY OF LIPASE: CPT | Mod: 91

## 2019-01-01 PROCEDURE — 63600175 PHARM REV CODE 636 W HCPCS: Performed by: PSYCHIATRY & NEUROLOGY

## 2019-01-01 PROCEDURE — C9113 INJ PANTOPRAZOLE SODIUM, VIA: HCPCS | Performed by: NURSE PRACTITIONER

## 2019-01-01 PROCEDURE — 70450 CT HEAD/BRAIN W/O DYE: CPT | Mod: TC

## 2019-01-01 PROCEDURE — 85730 THROMBOPLASTIN TIME PARTIAL: CPT

## 2019-01-01 PROCEDURE — 82248 BILIRUBIN DIRECT: CPT

## 2019-01-01 PROCEDURE — 85027 COMPLETE CBC AUTOMATED: CPT

## 2019-01-01 PROCEDURE — 25000003 PHARM REV CODE 250: Performed by: NURSE ANESTHETIST, CERTIFIED REGISTERED

## 2019-01-01 PROCEDURE — 31622 PR BRONCHOSCOPY,DIAGNOSTIC: ICD-10-PCS | Mod: ,,, | Performed by: INTERNAL MEDICINE

## 2019-01-01 PROCEDURE — 92507 TX SP LANG VOICE COMM INDIV: CPT

## 2019-01-01 PROCEDURE — 80048 BASIC METABOLIC PNL TOTAL CA: CPT | Mod: 91

## 2019-01-01 PROCEDURE — 83605 ASSAY OF LACTIC ACID: CPT | Mod: 91

## 2019-01-01 PROCEDURE — 87186 SC STD MICRODIL/AGAR DIL: CPT

## 2019-01-01 PROCEDURE — 94002 VENT MGMT INPAT INIT DAY: CPT

## 2019-01-01 PROCEDURE — 82803 BLOOD GASES ANY COMBINATION: CPT

## 2019-01-01 PROCEDURE — 83036 HEMOGLOBIN GLYCOSYLATED A1C: CPT

## 2019-01-01 PROCEDURE — 25000003 PHARM REV CODE 250: Performed by: INTERNAL MEDICINE

## 2019-01-01 PROCEDURE — 80076 HEPATIC FUNCTION PANEL: CPT

## 2019-01-01 PROCEDURE — 25500020 PHARM REV CODE 255: Performed by: EMERGENCY MEDICINE

## 2019-01-01 PROCEDURE — 36000711

## 2019-01-01 PROCEDURE — 81001 URINALYSIS AUTO W/SCOPE: CPT

## 2019-01-01 PROCEDURE — 20000000 HC ICU ROOM

## 2019-01-01 PROCEDURE — 84443 ASSAY THYROID STIM HORMONE: CPT

## 2019-01-01 PROCEDURE — 84132 ASSAY OF SERUM POTASSIUM: CPT | Mod: 91

## 2019-01-01 PROCEDURE — 25000003 PHARM REV CODE 250: Performed by: ANESTHESIOLOGY

## 2019-01-01 PROCEDURE — 87040 BLOOD CULTURE FOR BACTERIA: CPT

## 2019-01-01 PROCEDURE — 12000002 HC ACUTE/MED SURGE SEMI-PRIVATE ROOM

## 2019-01-01 PROCEDURE — 20600001 HC STEP DOWN PRIVATE ROOM

## 2019-01-01 PROCEDURE — 27000221 HC OXYGEN, UP TO 24 HOURS

## 2019-01-01 PROCEDURE — 92526 ORAL FUNCTION THERAPY: CPT

## 2019-01-01 PROCEDURE — 97127 HC THERAPEUTIC INTVTN, COGN FUNCTION - OT: CPT

## 2019-01-01 PROCEDURE — 87522 HEPATITIS C REVRS TRNSCRPJ: CPT

## 2019-01-01 PROCEDURE — 82977 ASSAY OF GGT: CPT | Mod: 91

## 2019-01-01 PROCEDURE — 85007 BL SMEAR W/DIFF WBC COUNT: CPT

## 2019-01-01 PROCEDURE — 82150 ASSAY OF AMYLASE: CPT | Mod: 91

## 2019-01-01 PROCEDURE — 92610 EVALUATE SWALLOWING FUNCTION: CPT

## 2019-01-01 PROCEDURE — 99233 PR SUBSEQUENT HOSPITAL CARE,LEVL III: ICD-10-PCS | Mod: ,,, | Performed by: NEUROLOGICAL SURGERY

## 2019-01-01 PROCEDURE — 99223 PR INITIAL HOSPITAL CARE,LEVL III: ICD-10-PCS | Mod: ,,, | Performed by: PSYCHIATRY & NEUROLOGY

## 2019-01-01 PROCEDURE — 63600175 PHARM REV CODE 636 W HCPCS: Mod: JG | Performed by: NURSE ANESTHETIST, CERTIFIED REGISTERED

## 2019-01-01 PROCEDURE — 25000003 PHARM REV CODE 250: Performed by: HOSPITALIST

## 2019-01-01 PROCEDURE — 94640 AIRWAY INHALATION TREATMENT: CPT

## 2019-01-01 PROCEDURE — 93010 ELECTROCARDIOGRAM REPORT: CPT | Mod: ,,, | Performed by: INTERNAL MEDICINE

## 2019-01-01 PROCEDURE — 63600175 PHARM REV CODE 636 W HCPCS: Performed by: RADIOLOGY

## 2019-01-01 PROCEDURE — 81000 URINALYSIS NONAUTO W/SCOPE: CPT

## 2019-01-01 PROCEDURE — 99232 PR SUBSEQUENT HOSPITAL CARE,LEVL II: ICD-10-PCS | Mod: ,,, | Performed by: ANESTHESIOLOGY

## 2019-01-01 PROCEDURE — 94668 MNPJ CHEST WALL SBSQ: CPT

## 2019-01-01 PROCEDURE — 93010 EKG 12-LEAD: ICD-10-PCS | Mod: ,,, | Performed by: INTERNAL MEDICINE

## 2019-01-01 PROCEDURE — 63600175 PHARM REV CODE 636 W HCPCS: Performed by: HOSPITALIST

## 2019-01-01 PROCEDURE — 25000242 PHARM REV CODE 250 ALT 637 W/ HCPCS

## 2019-01-01 PROCEDURE — 99232 SBSQ HOSP IP/OBS MODERATE 35: CPT | Mod: ,,, | Performed by: ANESTHESIOLOGY

## 2019-01-01 PROCEDURE — A4217 STERILE WATER/SALINE, 500 ML: HCPCS | Performed by: ANESTHESIOLOGY

## 2019-01-01 PROCEDURE — 51798 US URINE CAPACITY MEASURE: CPT

## 2019-01-01 PROCEDURE — 36415 COLL VENOUS BLD VENIPUNCTURE: CPT

## 2019-01-01 PROCEDURE — 99291 CRITICAL CARE FIRST HOUR: CPT | Mod: ,,, | Performed by: PSYCHIATRY & NEUROLOGY

## 2019-01-01 PROCEDURE — S5012 5% DEXTROSE WITH POTASSIUM: HCPCS

## 2019-01-01 PROCEDURE — 82550 ASSAY OF CK (CPK): CPT | Mod: 91

## 2019-01-01 PROCEDURE — 63600175 PHARM REV CODE 636 W HCPCS: Performed by: ANESTHESIOLOGY

## 2019-01-01 PROCEDURE — 25000003 PHARM REV CODE 250: Performed by: RADIOLOGY

## 2019-01-01 PROCEDURE — 86850 RBC ANTIBODY SCREEN: CPT

## 2019-01-01 PROCEDURE — A4217 STERILE WATER/SALINE, 500 ML: HCPCS

## 2019-01-01 PROCEDURE — 99223 1ST HOSP IP/OBS HIGH 75: CPT | Mod: ,,, | Performed by: NEUROLOGICAL SURGERY

## 2019-01-01 PROCEDURE — 37000008 HC ANESTHESIA 1ST 15 MINUTES

## 2019-01-01 PROCEDURE — 31622 DX BRONCHOSCOPE/WASH: CPT | Mod: ,,, | Performed by: INTERNAL MEDICINE

## 2019-01-01 PROCEDURE — 84295 ASSAY OF SERUM SODIUM: CPT

## 2019-01-01 PROCEDURE — 87077 CULTURE AEROBIC IDENTIFY: CPT

## 2019-01-01 PROCEDURE — 86703 HIV-1/HIV-2 1 RESULT ANTBDY: CPT

## 2019-01-01 PROCEDURE — 97112 NEUROMUSCULAR REEDUCATION: CPT

## 2019-01-01 PROCEDURE — 84145 PROCALCITONIN (PCT): CPT

## 2019-01-01 PROCEDURE — 87070 CULTURE OTHR SPECIMN AEROBIC: CPT

## 2019-01-01 PROCEDURE — 85610 PROTHROMBIN TIME: CPT | Mod: 91

## 2019-01-01 PROCEDURE — P9045 ALBUMIN (HUMAN), 5%, 250 ML: HCPCS | Mod: JG | Performed by: NURSE ANESTHETIST, CERTIFIED REGISTERED

## 2019-01-01 PROCEDURE — 97110 THERAPEUTIC EXERCISES: CPT

## 2019-01-01 PROCEDURE — 92523 SPEECH SOUND LANG COMPREHEN: CPT

## 2019-01-01 PROCEDURE — 99233 SBSQ HOSP IP/OBS HIGH 50: CPT | Mod: ,,, | Performed by: NEUROLOGICAL SURGERY

## 2019-01-01 PROCEDURE — 85730 THROMBOPLASTIN TIME PARTIAL: CPT | Mod: 91

## 2019-01-01 PROCEDURE — A4217 STERILE WATER/SALINE, 500 ML: HCPCS | Performed by: PSYCHIATRY & NEUROLOGY

## 2019-01-01 PROCEDURE — 70450 CT HEAD WITHOUT CONTRAST: ICD-10-PCS | Mod: 26,,, | Performed by: RADIOLOGY

## 2019-01-01 PROCEDURE — S5010 5% DEXTROSE AND 0.45% SALINE: HCPCS | Performed by: INTERNAL MEDICINE

## 2019-01-01 PROCEDURE — 99223 1ST HOSP IP/OBS HIGH 75: CPT | Mod: ,,, | Performed by: PSYCHIATRY & NEUROLOGY

## 2019-01-01 PROCEDURE — 87205 SMEAR GRAM STAIN: CPT

## 2019-01-01 PROCEDURE — 99291 PR CRITICAL CARE, E/M 30-74 MINUTES: ICD-10-PCS | Mod: ,,, | Performed by: NURSE PRACTITIONER

## 2019-01-01 PROCEDURE — 85610 PROTHROMBIN TIME: CPT

## 2019-01-01 PROCEDURE — 82977 ASSAY OF GGT: CPT

## 2019-01-01 PROCEDURE — 82800 BLOOD PH: CPT

## 2019-01-01 PROCEDURE — 93005 ELECTROCARDIOGRAM TRACING: CPT

## 2019-01-01 PROCEDURE — 84484 ASSAY OF TROPONIN QUANT: CPT | Mod: 91

## 2019-01-01 PROCEDURE — 63600175 PHARM REV CODE 636 W HCPCS: Performed by: NURSE ANESTHETIST, CERTIFIED REGISTERED

## 2019-01-01 PROCEDURE — P9047 ALBUMIN (HUMAN), 25%, 50ML: HCPCS | Mod: JG

## 2019-01-01 PROCEDURE — 83605 ASSAY OF LACTIC ACID: CPT

## 2019-01-01 PROCEDURE — 99291 CRITICAL CARE FIRST HOUR: CPT | Mod: ,,, | Performed by: EMERGENCY MEDICINE

## 2019-01-01 PROCEDURE — 25000003 PHARM REV CODE 250: Performed by: FAMILY MEDICINE

## 2019-01-01 PROCEDURE — 27201040 HC RC 50 FILTER

## 2019-01-01 PROCEDURE — 81003 URINALYSIS AUTO W/O SCOPE: CPT

## 2019-01-01 PROCEDURE — 36600 WITHDRAWAL OF ARTERIAL BLOOD: CPT

## 2019-01-01 PROCEDURE — 97167 OT EVAL HIGH COMPLEX 60 MIN: CPT

## 2019-01-01 PROCEDURE — 25000003 PHARM REV CODE 250

## 2019-01-01 PROCEDURE — 85652 RBC SED RATE AUTOMATED: CPT

## 2019-01-01 PROCEDURE — 83690 ASSAY OF LIPASE: CPT

## 2019-01-01 PROCEDURE — 86901 BLOOD TYPING SEROLOGIC RH(D): CPT

## 2019-01-01 PROCEDURE — 87491 CHLMYD TRACH DNA AMP PROBE: CPT

## 2019-01-01 PROCEDURE — 86592 SYPHILIS TEST NON-TREP QUAL: CPT

## 2019-01-01 PROCEDURE — 80074 ACUTE HEPATITIS PANEL: CPT

## 2019-01-01 PROCEDURE — 86920 COMPATIBILITY TEST SPIN: CPT

## 2019-01-01 PROCEDURE — D9220A PRA ANESTHESIA: ICD-10-PCS | Mod: ,,, | Performed by: ANESTHESIOLOGY

## 2019-01-01 PROCEDURE — 99223 PR INITIAL HOSPITAL CARE,LEVL III: ICD-10-PCS | Mod: ,,, | Performed by: NEUROLOGICAL SURGERY

## 2019-01-01 PROCEDURE — 97162 PT EVAL MOD COMPLEX 30 MIN: CPT

## 2019-01-01 PROCEDURE — 37000009 HC ANESTHESIA EA ADD 15 MINS

## 2019-01-01 PROCEDURE — D9220A PRA ANESTHESIA: Mod: ,,, | Performed by: ANESTHESIOLOGY

## 2019-01-01 PROCEDURE — 37799 UNLISTED PX VASCULAR SURGERY: CPT

## 2019-01-01 PROCEDURE — 83735 ASSAY OF MAGNESIUM: CPT | Mod: 91

## 2019-01-01 PROCEDURE — 70450 CT HEAD/BRAIN W/O DYE: CPT | Mod: 26,,, | Performed by: RADIOLOGY

## 2019-01-01 PROCEDURE — 83615 LACTATE (LD) (LDH) ENZYME: CPT

## 2019-01-01 PROCEDURE — 80061 LIPID PANEL: CPT

## 2019-01-01 PROCEDURE — 27100025 HC TUBING, SET FLUID WARMER: Performed by: NURSE ANESTHETIST, CERTIFIED REGISTERED

## 2019-01-01 PROCEDURE — 99291 CRITICAL CARE FIRST HOUR: CPT

## 2019-01-01 PROCEDURE — 31622 DX BRONCHOSCOPE/WASH: CPT | Performed by: EMERGENCY MEDICINE

## 2019-01-01 PROCEDURE — 99232 SBSQ HOSP IP/OBS MODERATE 35: CPT | Mod: ,,, | Performed by: PSYCHIATRY & NEUROLOGY

## 2019-01-01 RX ORDER — LORAZEPAM 2 MG/ML
2 INJECTION INTRAMUSCULAR
Status: CANCELLED | OUTPATIENT
Start: 2019-01-01

## 2019-01-01 RX ORDER — AMOXICILLIN 250 MG
1 CAPSULE ORAL 2 TIMES DAILY
Status: DISCONTINUED | OUTPATIENT
Start: 2019-01-01 | End: 2019-01-01 | Stop reason: HOSPADM

## 2019-01-01 RX ORDER — HYDRALAZINE HYDROCHLORIDE 20 MG/ML
10 INJECTION INTRAMUSCULAR; INTRAVENOUS EVERY 6 HOURS PRN
Status: DISCONTINUED | OUTPATIENT
Start: 2019-01-01 | End: 2019-01-01

## 2019-01-01 RX ORDER — LORAZEPAM 2 MG/ML
2 CONCENTRATE ORAL ONCE
Status: DISCONTINUED | OUTPATIENT
Start: 2019-01-01 | End: 2019-01-01

## 2019-01-01 RX ORDER — NOREPINEPHRINE BITARTRATE/D5W 4MG/250ML
0.05 PLASTIC BAG, INJECTION (ML) INTRAVENOUS CONTINUOUS
Status: DISCONTINUED | OUTPATIENT
Start: 2019-01-01 | End: 2019-01-01 | Stop reason: HOSPADM

## 2019-01-01 RX ORDER — ONDANSETRON 2 MG/ML
4 INJECTION INTRAMUSCULAR; INTRAVENOUS EVERY 6 HOURS PRN
Status: CANCELLED | OUTPATIENT
Start: 2019-01-01

## 2019-01-01 RX ORDER — NICARDIPINE HYDROCHLORIDE 0.2 MG/ML
5 INJECTION INTRAVENOUS CONTINUOUS
Status: DISCONTINUED | OUTPATIENT
Start: 2019-01-01 | End: 2019-01-01 | Stop reason: HOSPADM

## 2019-01-01 RX ORDER — 3% SODIUM CHLORIDE 3 G/100ML
150 INJECTION, SOLUTION INTRAVENOUS CONTINUOUS
Status: DISPENSED | OUTPATIENT
Start: 2019-01-01 | End: 2019-01-01

## 2019-01-01 RX ORDER — LORAZEPAM 2 MG/ML
INJECTION INTRAMUSCULAR
Status: COMPLETED
Start: 2019-01-01 | End: 2019-01-01

## 2019-01-01 RX ORDER — VANCOMYCIN 1.75 GRAM/500 ML IN 0.9 % SODIUM CHLORIDE INTRAVENOUS
1750 ONCE
Status: COMPLETED | OUTPATIENT
Start: 2019-01-01 | End: 2019-01-01

## 2019-01-01 RX ORDER — SODIUM CHLORIDE 9 MG/ML
INJECTION, SOLUTION INTRAVENOUS CONTINUOUS
Status: DISCONTINUED | OUTPATIENT
Start: 2019-01-01 | End: 2019-01-01

## 2019-01-01 RX ORDER — AMLODIPINE BESYLATE 5 MG/1
5 TABLET ORAL DAILY
Qty: 30 TABLET | Refills: 1 | Status: ON HOLD
Start: 2019-01-01 | End: 2019-01-01 | Stop reason: HOSPADM

## 2019-01-01 RX ORDER — SODIUM,POTASSIUM PHOSPHATES 280-250MG
2 POWDER IN PACKET (EA) ORAL
Status: DISCONTINUED | OUTPATIENT
Start: 2019-01-01 | End: 2019-01-01

## 2019-01-01 RX ORDER — ACETAMINOPHEN 325 MG/1
650 TABLET ORAL EVERY 6 HOURS PRN
Status: DISCONTINUED | OUTPATIENT
Start: 2019-01-01 | End: 2019-01-01 | Stop reason: HOSPADM

## 2019-01-01 RX ORDER — LEVETIRACETAM 10 MG/ML
1000 INJECTION INTRAVASCULAR EVERY 12 HOURS
Status: DISCONTINUED | OUTPATIENT
Start: 2019-01-01 | End: 2019-01-01 | Stop reason: HOSPADM

## 2019-01-01 RX ORDER — DIPHENHYDRAMINE HYDROCHLORIDE 50 MG/ML
25 INJECTION INTRAMUSCULAR; INTRAVENOUS EVERY 6 HOURS PRN
Status: CANCELLED | OUTPATIENT
Start: 2019-01-01

## 2019-01-01 RX ORDER — POTASSIUM CHLORIDE 14.9 MG/ML
40 INJECTION INTRAVENOUS ONCE
Status: COMPLETED | OUTPATIENT
Start: 2019-01-01 | End: 2019-01-01

## 2019-01-01 RX ORDER — GABAPENTIN 300 MG/1
300 CAPSULE ORAL 3 TIMES DAILY
Status: ON HOLD | COMMUNITY
End: 2019-01-01 | Stop reason: HOSPADM

## 2019-01-01 RX ORDER — LEVETIRACETAM 10 MG/ML
1000 INJECTION INTRAVASCULAR EVERY 12 HOURS
Status: CANCELLED | OUTPATIENT
Start: 2019-01-01

## 2019-01-01 RX ORDER — FOLIC ACID 1 MG/1
1 TABLET ORAL DAILY
Refills: 0 | Status: ON HOLD
Start: 2019-01-01 | End: 2019-01-01 | Stop reason: HOSPADM

## 2019-01-01 RX ORDER — CLONAZEPAM 0.5 MG/1
0.5 TABLET ORAL 2 TIMES DAILY PRN
Status: ON HOLD | COMMUNITY
End: 2019-01-01 | Stop reason: HOSPADM

## 2019-01-01 RX ORDER — AMLODIPINE BESYLATE 5 MG/1
5 TABLET ORAL DAILY
Status: DISCONTINUED | OUTPATIENT
Start: 2019-01-01 | End: 2019-01-01 | Stop reason: HOSPADM

## 2019-01-01 RX ORDER — SODIUM CHLORIDE 9 MG/ML
INJECTION, SOLUTION INTRAVENOUS CONTINUOUS PRN
Status: DISCONTINUED | OUTPATIENT
Start: 2019-01-01 | End: 2019-01-01

## 2019-01-01 RX ORDER — OLANZAPINE 2.5 MG/1
5 TABLET ORAL EVERY 6 HOURS PRN
Status: DISCONTINUED | OUTPATIENT
Start: 2019-01-01 | End: 2019-01-01 | Stop reason: HOSPADM

## 2019-01-01 RX ORDER — LEVETIRACETAM 100 MG/ML
500 SOLUTION ORAL 2 TIMES DAILY
Status: COMPLETED | OUTPATIENT
Start: 2019-01-01 | End: 2019-01-01

## 2019-01-01 RX ORDER — CHLORHEXIDINE GLUCONATE ORAL RINSE 1.2 MG/ML
15 SOLUTION DENTAL 2 TIMES DAILY
Refills: 0
Start: 2019-01-01 | End: 2019-10-04

## 2019-01-01 RX ORDER — MEROPENEM AND SODIUM CHLORIDE 1 G/50ML
1 INJECTION, SOLUTION INTRAVENOUS
Status: CANCELLED | OUTPATIENT
Start: 2019-01-01

## 2019-01-01 RX ORDER — LANOLIN ALCOHOL/MO/W.PET/CERES
100 CREAM (GRAM) TOPICAL DAILY
Status: ON HOLD | COMMUNITY
Start: 2019-01-01 | End: 2019-01-01 | Stop reason: HOSPADM

## 2019-01-01 RX ORDER — ACETAMINOPHEN 325 MG/1
650 TABLET ORAL EVERY 6 HOURS PRN
Refills: 0 | COMMUNITY
Start: 2019-01-01

## 2019-01-01 RX ORDER — IODIXANOL 320 MG/ML
350 INJECTION, SOLUTION INTRAVASCULAR
Status: COMPLETED | OUTPATIENT
Start: 2019-01-01 | End: 2019-01-01

## 2019-01-01 RX ORDER — NICARDIPINE HYDROCHLORIDE 0.2 MG/ML
2.5 INJECTION INTRAVENOUS CONTINUOUS
Status: DISCONTINUED | OUTPATIENT
Start: 2019-01-01 | End: 2019-01-01

## 2019-01-01 RX ORDER — ACETAMINOPHEN 325 MG/1
650 TABLET ORAL EVERY 4 HOURS PRN
Status: DISCONTINUED | OUTPATIENT
Start: 2019-01-01 | End: 2019-01-01 | Stop reason: HOSPADM

## 2019-01-01 RX ORDER — LORAZEPAM 2 MG/ML
2 INJECTION INTRAMUSCULAR
Status: DISCONTINUED | OUTPATIENT
Start: 2019-01-01 | End: 2019-01-01 | Stop reason: HOSPADM

## 2019-01-01 RX ORDER — BUPRENORPHINE AND NALOXONE 8; 2 MG/1; MG/1
2 FILM, SOLUBLE BUCCAL; SUBLINGUAL DAILY
Status: ON HOLD | COMMUNITY
End: 2019-01-01 | Stop reason: HOSPADM

## 2019-01-01 RX ORDER — FENTANYL CITRATE 50 UG/ML
INJECTION, SOLUTION INTRAMUSCULAR; INTRAVENOUS
Status: COMPLETED
Start: 2019-01-01 | End: 2019-01-01

## 2019-01-01 RX ORDER — SODIUM CHLORIDE 0.9 % (FLUSH) 0.9 %
10 SYRINGE (ML) INJECTION
Status: DISCONTINUED | OUTPATIENT
Start: 2019-01-01 | End: 2019-01-01 | Stop reason: HOSPADM

## 2019-01-01 RX ORDER — HALOPERIDOL 5 MG/ML
5 INJECTION INTRAMUSCULAR EVERY 6 HOURS PRN
Status: CANCELLED | OUTPATIENT
Start: 2019-01-01

## 2019-01-01 RX ORDER — POTASSIUM CHLORIDE 20 MEQ/15ML
40 SOLUTION ORAL
Status: DISCONTINUED | OUTPATIENT
Start: 2019-01-01 | End: 2019-01-01

## 2019-01-01 RX ORDER — CHLORHEXIDINE GLUCONATE ORAL RINSE 1.2 MG/ML
15 SOLUTION DENTAL 2 TIMES DAILY
Status: DISCONTINUED | OUTPATIENT
Start: 2019-01-01 | End: 2019-01-01 | Stop reason: HOSPADM

## 2019-01-01 RX ORDER — POTASSIUM CHLORIDE 7.45 MG/ML
60 INJECTION INTRAVENOUS
Status: DISCONTINUED | OUTPATIENT
Start: 2019-01-01 | End: 2019-01-01

## 2019-01-01 RX ORDER — SODIUM CHLORIDE, SODIUM LACTATE, POTASSIUM CHLORIDE, CALCIUM CHLORIDE 600; 310; 30; 20 MG/100ML; MG/100ML; MG/100ML; MG/100ML
INJECTION, SOLUTION INTRAVENOUS CONTINUOUS PRN
Status: DISCONTINUED | OUTPATIENT
Start: 2019-01-01 | End: 2019-01-01

## 2019-01-01 RX ORDER — MORPHINE SULFATE 2 MG/ML
2 INJECTION, SOLUTION INTRAMUSCULAR; INTRAVENOUS
Status: DISCONTINUED | OUTPATIENT
Start: 2019-01-01 | End: 2019-01-01 | Stop reason: HOSPADM

## 2019-01-01 RX ORDER — MIDAZOLAM HYDROCHLORIDE 1 MG/ML
2 INJECTION INTRAMUSCULAR; INTRAVENOUS
Status: COMPLETED | OUTPATIENT
Start: 2019-01-01 | End: 2019-01-01

## 2019-01-01 RX ORDER — ALBUMIN HUMAN 50 G/1000ML
SOLUTION INTRAVENOUS CONTINUOUS PRN
Status: DISCONTINUED | OUTPATIENT
Start: 2019-01-01 | End: 2019-01-01

## 2019-01-01 RX ORDER — MAGNESIUM SULFATE HEPTAHYDRATE 40 MG/ML
2 INJECTION, SOLUTION INTRAVENOUS
Status: DISCONTINUED | OUTPATIENT
Start: 2019-01-01 | End: 2019-01-01

## 2019-01-01 RX ORDER — IPRATROPIUM BROMIDE 0.5 MG/2.5ML
0.5 SOLUTION RESPIRATORY (INHALATION) EVERY 4 HOURS
Status: DISCONTINUED | OUTPATIENT
Start: 2019-01-01 | End: 2019-01-01 | Stop reason: HOSPADM

## 2019-01-01 RX ORDER — POTASSIUM CHLORIDE 7.45 MG/ML
80 INJECTION INTRAVENOUS
Status: DISCONTINUED | OUTPATIENT
Start: 2019-01-01 | End: 2019-01-01

## 2019-01-01 RX ORDER — POTASSIUM CHLORIDE 20 MEQ/15ML
60 SOLUTION ORAL
Status: DISCONTINUED | OUTPATIENT
Start: 2019-01-01 | End: 2019-01-01

## 2019-01-01 RX ORDER — ONDANSETRON 2 MG/ML
4 INJECTION INTRAMUSCULAR; INTRAVENOUS EVERY 8 HOURS PRN
Start: 2019-01-01

## 2019-01-01 RX ORDER — OLANZAPINE 5 MG/1
5 TABLET ORAL EVERY 6 HOURS PRN
Qty: 120 TABLET | Refills: 0 | Status: ON HOLD
Start: 2019-01-01 | End: 2019-01-01 | Stop reason: HOSPADM

## 2019-01-01 RX ORDER — ONDANSETRON 2 MG/ML
4 INJECTION INTRAMUSCULAR; INTRAVENOUS EVERY 8 HOURS PRN
Status: DISCONTINUED | OUTPATIENT
Start: 2019-01-01 | End: 2019-01-01 | Stop reason: HOSPADM

## 2019-01-01 RX ORDER — SODIUM CHLORIDE AND POTASSIUM CHLORIDE 150; 900 MG/100ML; MG/100ML
INJECTION, SOLUTION INTRAVENOUS CONTINUOUS
Status: DISCONTINUED | OUTPATIENT
Start: 2019-01-01 | End: 2019-01-01

## 2019-01-01 RX ORDER — MAGNESIUM SULFATE HEPTAHYDRATE 40 MG/ML
2 INJECTION, SOLUTION INTRAVENOUS
Status: COMPLETED | OUTPATIENT
Start: 2019-01-01 | End: 2019-01-01

## 2019-01-01 RX ORDER — MIDAZOLAM HYDROCHLORIDE 1 MG/ML
1 INJECTION, SOLUTION INTRAMUSCULAR; INTRAVENOUS EVERY 5 MIN PRN
Status: DISCONTINUED | OUTPATIENT
Start: 2019-01-01 | End: 2019-01-01

## 2019-01-01 RX ORDER — HEPARIN SODIUM 1000 [USP'U]/ML
INJECTION, SOLUTION INTRAVENOUS; SUBCUTANEOUS
Status: DISCONTINUED | OUTPATIENT
Start: 2019-01-01 | End: 2019-01-01

## 2019-01-01 RX ORDER — ENOXAPARIN SODIUM 100 MG/ML
40 INJECTION SUBCUTANEOUS EVERY 24 HOURS
Status: DISCONTINUED | OUTPATIENT
Start: 2019-01-01 | End: 2019-01-01

## 2019-01-01 RX ORDER — IBUPROFEN 200 MG
24 TABLET ORAL
Status: DISCONTINUED | OUTPATIENT
Start: 2019-01-01 | End: 2019-01-01 | Stop reason: HOSPADM

## 2019-01-01 RX ORDER — PHENYLEPHRINE HYDROCHLORIDE 10 MG/ML
INJECTION INTRAVENOUS
Status: DISCONTINUED | OUTPATIENT
Start: 2019-01-01 | End: 2019-01-01

## 2019-01-01 RX ORDER — ONDANSETRON 2 MG/ML
4 INJECTION INTRAMUSCULAR; INTRAVENOUS EVERY 6 HOURS PRN
Status: DISCONTINUED | OUTPATIENT
Start: 2019-01-01 | End: 2019-01-01 | Stop reason: HOSPADM

## 2019-01-01 RX ORDER — HEPARIN SODIUM 5000 [USP'U]/ML
5000 INJECTION, SOLUTION INTRAVENOUS; SUBCUTANEOUS EVERY 8 HOURS
Status: DISCONTINUED | OUTPATIENT
Start: 2019-01-01 | End: 2019-01-01 | Stop reason: HOSPADM

## 2019-01-01 RX ORDER — ALBUMIN HUMAN 250 G/1000ML
25 SOLUTION INTRAVENOUS ONCE
Status: COMPLETED | OUTPATIENT
Start: 2019-01-01 | End: 2019-01-01

## 2019-01-01 RX ORDER — HALOPERIDOL 5 MG/ML
5 INJECTION INTRAMUSCULAR EVERY 6 HOURS PRN
Status: DISCONTINUED | OUTPATIENT
Start: 2019-01-01 | End: 2019-01-01 | Stop reason: HOSPADM

## 2019-01-01 RX ORDER — POTASSIUM CHLORIDE 7.45 MG/ML
10 INJECTION INTRAVENOUS
Status: COMPLETED | OUTPATIENT
Start: 2019-01-01 | End: 2019-01-01

## 2019-01-01 RX ORDER — IBUPROFEN 200 MG
16 TABLET ORAL
Status: DISCONTINUED | OUTPATIENT
Start: 2019-01-01 | End: 2019-01-01 | Stop reason: HOSPADM

## 2019-01-01 RX ORDER — VANCOMYCIN HCL IN 5 % DEXTROSE 1.25 G/25
20 PLASTIC BAG, INJECTION (ML) INTRAVENOUS ONCE
Status: DISCONTINUED | OUTPATIENT
Start: 2019-01-01 | End: 2019-01-01

## 2019-01-01 RX ORDER — POTASSIUM CHLORIDE 14.9 MG/ML
40 INJECTION INTRAVENOUS ONCE
Status: DISCONTINUED | OUTPATIENT
Start: 2019-01-01 | End: 2019-01-01

## 2019-01-01 RX ORDER — LANOLIN ALCOHOL/MO/W.PET/CERES
400 CREAM (GRAM) TOPICAL DAILY
Refills: 0 | Status: ON HOLD | COMMUNITY
Start: 2019-01-01 | End: 2019-01-01 | Stop reason: HOSPADM

## 2019-01-01 RX ORDER — 3% SODIUM CHLORIDE 3 G/100ML
250 INJECTION, SOLUTION INTRAVENOUS ONCE
Status: DISCONTINUED | OUTPATIENT
Start: 2019-01-01 | End: 2019-01-01

## 2019-01-01 RX ORDER — MORPHINE SULFATE 2 MG/ML
2 INJECTION, SOLUTION INTRAMUSCULAR; INTRAVENOUS
Status: CANCELLED | OUTPATIENT
Start: 2019-01-01

## 2019-01-01 RX ORDER — SODIUM CHLORIDE 0.9 % (FLUSH) 0.9 %
10 SYRINGE (ML) INJECTION
Status: CANCELLED | OUTPATIENT
Start: 2019-01-01

## 2019-01-01 RX ORDER — GLUCAGON 1 MG
1 KIT INJECTION
Status: DISCONTINUED | OUTPATIENT
Start: 2019-01-01 | End: 2019-01-01 | Stop reason: HOSPADM

## 2019-01-01 RX ORDER — LORAZEPAM 2 MG/ML
4 INJECTION INTRAMUSCULAR ONCE
Status: COMPLETED | OUTPATIENT
Start: 2019-01-01 | End: 2019-01-01

## 2019-01-01 RX ORDER — PANTOPRAZOLE SODIUM 40 MG/10ML
40 INJECTION, POWDER, LYOPHILIZED, FOR SOLUTION INTRAVENOUS 2 TIMES DAILY
Status: DISCONTINUED | OUTPATIENT
Start: 2019-01-01 | End: 2019-01-01

## 2019-01-01 RX ORDER — ACETAMINOPHEN 10 MG/ML
1000 INJECTION, SOLUTION INTRAVENOUS EVERY 8 HOURS
Status: COMPLETED | OUTPATIENT
Start: 2019-01-01 | End: 2019-01-01

## 2019-01-01 RX ORDER — DEXMEDETOMIDINE HYDROCHLORIDE 4 UG/ML
0.2 INJECTION INTRAVENOUS CONTINUOUS
Status: DISCONTINUED | OUTPATIENT
Start: 2019-01-01 | End: 2019-01-01

## 2019-01-01 RX ORDER — FENTANYL CITRATE 50 UG/ML
25 INJECTION, SOLUTION INTRAMUSCULAR; INTRAVENOUS ONCE
Status: COMPLETED | OUTPATIENT
Start: 2019-01-01 | End: 2019-01-01

## 2019-01-01 RX ORDER — NICARDIPINE HYDROCHLORIDE 0.2 MG/ML
1 INJECTION INTRAVENOUS CONTINUOUS
Status: DISCONTINUED | OUTPATIENT
Start: 2019-01-01 | End: 2019-01-01 | Stop reason: HOSPADM

## 2019-01-01 RX ORDER — DEXMEDETOMIDINE HYDROCHLORIDE 4 UG/ML
0.2 INJECTION INTRAVENOUS CONTINUOUS
Status: CANCELLED | OUTPATIENT
Start: 2019-01-01

## 2019-01-01 RX ORDER — LIDOCAINE HYDROCHLORIDE AND EPINEPHRINE 10; 10 MG/ML; UG/ML
10 INJECTION, SOLUTION INFILTRATION; PERINEURAL ONCE
Status: DISCONTINUED | OUTPATIENT
Start: 2019-01-01 | End: 2019-01-01

## 2019-01-01 RX ORDER — ACETAMINOPHEN 325 MG/1
650 TABLET ORAL EVERY 6 HOURS PRN
Status: DISCONTINUED | OUTPATIENT
Start: 2019-01-01 | End: 2019-01-01

## 2019-01-01 RX ORDER — LEVETIRACETAM 5 MG/ML
500 INJECTION INTRAVASCULAR EVERY 12 HOURS
Status: DISCONTINUED | OUTPATIENT
Start: 2019-01-01 | End: 2019-01-01

## 2019-01-01 RX ORDER — POTASSIUM CHLORIDE, DEXTROSE MONOHYDRATE 150; 5 MG/100ML; G/100ML
INJECTION, SOLUTION INTRAVENOUS CONTINUOUS
Status: DISCONTINUED | OUTPATIENT
Start: 2019-01-01 | End: 2019-01-01 | Stop reason: HOSPADM

## 2019-01-01 RX ORDER — MAGNESIUM SULFATE HEPTAHYDRATE 40 MG/ML
4 INJECTION, SOLUTION INTRAVENOUS
Status: DISCONTINUED | OUTPATIENT
Start: 2019-01-01 | End: 2019-01-01

## 2019-01-01 RX ORDER — LABETALOL HYDROCHLORIDE 5 MG/ML
10 INJECTION, SOLUTION INTRAVENOUS EVERY 6 HOURS PRN
Start: 2019-01-01 | End: 2020-09-18

## 2019-01-01 RX ORDER — MIDAZOLAM HYDROCHLORIDE 1 MG/ML
INJECTION INTRAMUSCULAR; INTRAVENOUS CODE/TRAUMA/SEDATION MEDICATION
Status: COMPLETED | OUTPATIENT
Start: 2019-01-01 | End: 2019-01-01

## 2019-01-01 RX ORDER — NICARDIPINE HYDROCHLORIDE 0.2 MG/ML
5 INJECTION INTRAVENOUS CONTINUOUS
Status: DISCONTINUED | OUTPATIENT
Start: 2019-01-01 | End: 2019-01-01

## 2019-01-01 RX ORDER — NOREPINEPHRINE BITARTRATE/D5W 4MG/250ML
0.02 PLASTIC BAG, INJECTION (ML) INTRAVENOUS CONTINUOUS
Status: DISCONTINUED | OUTPATIENT
Start: 2019-01-01 | End: 2019-01-01

## 2019-01-01 RX ORDER — LEVETIRACETAM 15 MG/ML
1500 INJECTION INTRAVASCULAR ONCE
Status: COMPLETED | OUTPATIENT
Start: 2019-01-01 | End: 2019-01-01

## 2019-01-01 RX ORDER — FENTANYL CITRATE 50 UG/ML
INJECTION, SOLUTION INTRAMUSCULAR; INTRAVENOUS CODE/TRAUMA/SEDATION MEDICATION
Status: COMPLETED | OUTPATIENT
Start: 2019-01-01 | End: 2019-01-01

## 2019-01-01 RX ORDER — PANTOPRAZOLE SODIUM 40 MG/1
40 TABLET, DELAYED RELEASE ORAL DAILY
Status: CANCELLED | OUTPATIENT
Start: 2019-01-01

## 2019-01-01 RX ORDER — LEVALBUTEROL 1.25 MG/.5ML
1.25 SOLUTION, CONCENTRATE RESPIRATORY (INHALATION) EVERY 8 HOURS
Status: DISCONTINUED | OUTPATIENT
Start: 2019-01-01 | End: 2019-01-01 | Stop reason: HOSPADM

## 2019-01-01 RX ORDER — LABETALOL HYDROCHLORIDE 5 MG/ML
5 INJECTION, SOLUTION INTRAVENOUS ONCE
Status: COMPLETED | OUTPATIENT
Start: 2019-01-01 | End: 2019-01-01

## 2019-01-01 RX ORDER — FOLIC ACID 1 MG/1
1 TABLET ORAL DAILY
Status: DISCONTINUED | OUTPATIENT
Start: 2019-01-01 | End: 2019-01-01 | Stop reason: HOSPADM

## 2019-01-01 RX ORDER — VANCOMYCIN HCL IN 5 % DEXTROSE 1G/250ML
1000 PLASTIC BAG, INJECTION (ML) INTRAVENOUS
Status: DISCONTINUED | OUTPATIENT
Start: 2019-01-01 | End: 2019-01-01

## 2019-01-01 RX ORDER — POTASSIUM CHLORIDE 7.45 MG/ML
40 INJECTION INTRAVENOUS
Status: DISCONTINUED | OUTPATIENT
Start: 2019-01-01 | End: 2019-01-01

## 2019-01-01 RX ORDER — NICARDIPINE HYDROCHLORIDE 0.2 MG/ML
INJECTION INTRAVENOUS
Status: DISPENSED
Start: 2019-01-01 | End: 2019-01-01

## 2019-01-01 RX ORDER — THIAMINE HCL 100 MG
100 TABLET ORAL DAILY
Status: DISCONTINUED | OUTPATIENT
Start: 2019-01-01 | End: 2019-01-01 | Stop reason: HOSPADM

## 2019-01-01 RX ORDER — SODIUM CHLORIDE 0.45 G/100ML
SOLUTION INTRAVENOUS CONTINUOUS
Status: DISCONTINUED | OUTPATIENT
Start: 2019-01-01 | End: 2019-01-01 | Stop reason: HOSPADM

## 2019-01-01 RX ORDER — ACETAMINOPHEN 500 MG
500 TABLET ORAL EVERY 6 HOURS PRN
Status: DISCONTINUED | OUTPATIENT
Start: 2019-01-01 | End: 2019-01-01

## 2019-01-01 RX ORDER — HYDRALAZINE HYDROCHLORIDE 20 MG/ML
10 INJECTION INTRAMUSCULAR; INTRAVENOUS EVERY 6 HOURS PRN
Status: DISCONTINUED | OUTPATIENT
Start: 2019-01-01 | End: 2019-01-01 | Stop reason: HOSPADM

## 2019-01-01 RX ORDER — NICARDIPINE HYDROCHLORIDE 0.2 MG/ML
5 INJECTION INTRAVENOUS CONTINUOUS
Status: CANCELLED | OUTPATIENT
Start: 2019-01-01

## 2019-01-01 RX ORDER — DEXTROSE MONOHYDRATE AND SODIUM CHLORIDE 5; .45 G/100ML; G/100ML
INJECTION, SOLUTION INTRAVENOUS CONTINUOUS
Status: DISCONTINUED | OUTPATIENT
Start: 2019-01-01 | End: 2019-01-01

## 2019-01-01 RX ORDER — POTASSIUM CHLORIDE 7.45 MG/ML
20 INJECTION INTRAVENOUS EVERY 6 HOURS
Status: COMPLETED | OUTPATIENT
Start: 2019-01-01 | End: 2019-01-01

## 2019-01-01 RX ORDER — MORPHINE SULFATE 2 MG/ML
INJECTION, SOLUTION INTRAMUSCULAR; INTRAVENOUS
Status: COMPLETED
Start: 2019-01-01 | End: 2019-01-01

## 2019-01-01 RX ORDER — PANTOPRAZOLE SODIUM 40 MG/1
40 TABLET, DELAYED RELEASE ORAL DAILY
Status: DISCONTINUED | OUTPATIENT
Start: 2019-01-01 | End: 2019-01-01 | Stop reason: HOSPADM

## 2019-01-01 RX ORDER — HYDRALAZINE HYDROCHLORIDE 20 MG/ML
10 INJECTION INTRAMUSCULAR; INTRAVENOUS EVERY 6 HOURS PRN
Start: 2019-01-01

## 2019-01-01 RX ORDER — DIPHENHYDRAMINE HYDROCHLORIDE 50 MG/ML
25 INJECTION INTRAMUSCULAR; INTRAVENOUS EVERY 6 HOURS PRN
Status: DISCONTINUED | OUTPATIENT
Start: 2019-01-01 | End: 2019-01-01 | Stop reason: HOSPADM

## 2019-01-01 RX ORDER — MANNITOL 250 MG/ML
INJECTION, SOLUTION INTRAVENOUS
Status: DISCONTINUED | OUTPATIENT
Start: 2019-01-01 | End: 2019-01-01

## 2019-01-01 RX ORDER — AMOXICILLIN 250 MG
1 CAPSULE ORAL 2 TIMES DAILY
Status: ON HOLD | COMMUNITY
Start: 2019-01-01 | End: 2019-01-01 | Stop reason: HOSPADM

## 2019-01-01 RX ORDER — OLANZAPINE 10 MG/2ML
5 INJECTION, POWDER, FOR SOLUTION INTRAMUSCULAR EVERY 8 HOURS PRN
Status: DISCONTINUED | OUTPATIENT
Start: 2019-01-01 | End: 2019-01-01 | Stop reason: HOSPADM

## 2019-01-01 RX ORDER — MEROPENEM AND SODIUM CHLORIDE 1 G/50ML
1 INJECTION, SOLUTION INTRAVENOUS
Status: DISCONTINUED | OUTPATIENT
Start: 2019-01-01 | End: 2019-01-01 | Stop reason: HOSPADM

## 2019-01-01 RX ORDER — BISACODYL 5 MG
5 TABLET, DELAYED RELEASE (ENTERIC COATED) ORAL ONCE
Status: COMPLETED | OUTPATIENT
Start: 2019-01-01 | End: 2019-01-01

## 2019-01-01 RX ORDER — LABETALOL HCL 20 MG/4 ML
SYRINGE (ML) INTRAVENOUS CODE/TRAUMA/SEDATION MEDICATION
Status: COMPLETED | OUTPATIENT
Start: 2019-01-01 | End: 2019-01-01

## 2019-01-01 RX ORDER — ROCURONIUM BROMIDE 10 MG/ML
INJECTION, SOLUTION INTRAVENOUS
Status: DISCONTINUED | OUTPATIENT
Start: 2019-01-01 | End: 2019-01-01

## 2019-01-01 RX ORDER — LANOLIN ALCOHOL/MO/W.PET/CERES
400 CREAM (GRAM) TOPICAL DAILY
Status: DISCONTINUED | OUTPATIENT
Start: 2019-01-01 | End: 2019-01-01 | Stop reason: HOSPADM

## 2019-01-01 RX ORDER — LABETALOL HYDROCHLORIDE 5 MG/ML
10 INJECTION, SOLUTION INTRAVENOUS EVERY 6 HOURS PRN
Status: DISCONTINUED | OUTPATIENT
Start: 2019-01-01 | End: 2019-01-01 | Stop reason: HOSPADM

## 2019-01-01 RX ORDER — FLUOXETINE HYDROCHLORIDE 40 MG/1
40 CAPSULE ORAL DAILY
Status: ON HOLD | COMMUNITY
End: 2019-01-01 | Stop reason: HOSPADM

## 2019-01-01 RX ADMIN — HEPARIN SODIUM 5000 UNITS: 5000 INJECTION, SOLUTION INTRAVENOUS; SUBCUTANEOUS at 02:06

## 2019-01-01 RX ADMIN — Medication 100 MG: at 08:06

## 2019-01-01 RX ADMIN — LEVETIRACETAM 500 MG: 5 INJECTION INTRAVENOUS at 08:06

## 2019-01-01 RX ADMIN — CHLORHEXIDINE GLUCONATE 0.12% ORAL RINSE 15 ML: 1.2 LIQUID ORAL at 09:09

## 2019-01-01 RX ADMIN — SODIUM CHLORIDE 150 ML: 3 INJECTION, SOLUTION INTRAVENOUS at 08:06

## 2019-01-01 RX ADMIN — MORPHINE SULFATE 2 MG: 2 INJECTION, SOLUTION INTRAMUSCULAR; INTRAVENOUS at 04:06

## 2019-01-01 RX ADMIN — DEXMEDETOMIDINE HYDROCHLORIDE 0.2 MCG/KG/HR: 4 INJECTION INTRAVENOUS at 03:06

## 2019-01-01 RX ADMIN — LEVALBUTEROL HYDROCHLORIDE 1.25 MG: 1.25 SOLUTION, CONCENTRATE RESPIRATORY (INHALATION) at 12:09

## 2019-01-01 RX ADMIN — MORPHINE SULFATE 2 MG: 2 INJECTION, SOLUTION INTRAMUSCULAR; INTRAVENOUS at 10:06

## 2019-01-01 RX ADMIN — HYDROCORTISONE SODIUM SUCCINATE 100 MG: 100 INJECTION, POWDER, FOR SOLUTION INTRAMUSCULAR; INTRAVENOUS at 05:09

## 2019-01-01 RX ADMIN — HALOPERIDOL LACTATE 5 MG: 5 INJECTION, SOLUTION INTRAMUSCULAR at 12:06

## 2019-01-01 RX ADMIN — HYDROCORTISONE SODIUM SUCCINATE 300 MG: 100 INJECTION, POWDER, FOR SOLUTION INTRAMUSCULAR; INTRAVENOUS at 01:09

## 2019-01-01 RX ADMIN — LEVETIRACETAM 500 MG: 500 SOLUTION ORAL at 08:06

## 2019-01-01 RX ADMIN — PIPERACILLIN AND TAZOBACTAM 4.5 G: 4; .5 INJECTION, POWDER, LYOPHILIZED, FOR SOLUTION INTRAVENOUS; PARENTERAL at 01:09

## 2019-01-01 RX ADMIN — MEROPENEM AND SODIUM CHLORIDE 1 G: 1 INJECTION, SOLUTION INTRAVENOUS at 09:06

## 2019-01-01 RX ADMIN — VANCOMYCIN HYDROCHLORIDE 1750 MG: 100 INJECTION, POWDER, LYOPHILIZED, FOR SOLUTION INTRAVENOUS at 12:06

## 2019-01-01 RX ADMIN — SENNOSIDES AND DOCUSATE SODIUM 1 TABLET: 8.6; 5 TABLET ORAL at 09:06

## 2019-01-01 RX ADMIN — LORAZEPAM 2 MG: 2 INJECTION, SOLUTION INTRAMUSCULAR; INTRAVENOUS at 06:06

## 2019-01-01 RX ADMIN — DEXTROSE AND SODIUM CHLORIDE: 5; .45 INJECTION, SOLUTION INTRAVENOUS at 05:09

## 2019-01-01 RX ADMIN — HYDRALAZINE HYDROCHLORIDE 10 MG: 20 INJECTION INTRAMUSCULAR; INTRAVENOUS at 09:06

## 2019-01-01 RX ADMIN — ACETAMINOPHEN 650 MG: 325 TABLET ORAL at 08:06

## 2019-01-01 RX ADMIN — MEROPENEM AND SODIUM CHLORIDE 1 G: 1 INJECTION, SOLUTION INTRAVENOUS at 04:06

## 2019-01-01 RX ADMIN — ACETAMINOPHEN 1000 MG: 10 INJECTION, SOLUTION INTRAVENOUS at 05:06

## 2019-01-01 RX ADMIN — MEROPENEM AND SODIUM CHLORIDE 1 G: 1 INJECTION, SOLUTION INTRAVENOUS at 08:06

## 2019-01-01 RX ADMIN — SENNOSIDES AND DOCUSATE SODIUM 1 TABLET: 8.6; 5 TABLET ORAL at 08:06

## 2019-01-01 RX ADMIN — FOLIC ACID 1 MG: 1 TABLET ORAL at 08:06

## 2019-01-01 RX ADMIN — SODIUM CHLORIDE: 234 INJECTION INTRAMUSCULAR; INTRAVENOUS; SUBCUTANEOUS at 01:09

## 2019-01-01 RX ADMIN — LORAZEPAM 4 MG: 2 INJECTION, SOLUTION INTRAMUSCULAR; INTRAVENOUS at 11:06

## 2019-01-01 RX ADMIN — LORAZEPAM 2 MG: 2 INJECTION, SOLUTION INTRAMUSCULAR; INTRAVENOUS at 05:06

## 2019-01-01 RX ADMIN — SODIUM CHLORIDE: 234 INJECTION INTRAMUSCULAR; INTRAVENOUS; SUBCUTANEOUS at 05:09

## 2019-01-01 RX ADMIN — POTASSIUM CHLORIDE 60 MEQ: 10 INJECTION, SOLUTION INTRAVENOUS at 09:06

## 2019-01-01 RX ADMIN — MORPHINE SULFATE 2 MG: 2 INJECTION, SOLUTION INTRAMUSCULAR; INTRAVENOUS at 09:06

## 2019-01-01 RX ADMIN — LORAZEPAM 2 MG: 2 INJECTION, SOLUTION INTRAMUSCULAR; INTRAVENOUS at 02:06

## 2019-01-01 RX ADMIN — LABETALOL HYDROCHLORIDE 10 MG: 5 INJECTION INTRAVENOUS at 02:09

## 2019-01-01 RX ADMIN — Medication 0.15 MCG/KG/MIN: at 04:09

## 2019-01-01 RX ADMIN — DEXTROSE AND SODIUM CHLORIDE: 5; .45 INJECTION, SOLUTION INTRAVENOUS at 09:09

## 2019-01-01 RX ADMIN — ACETAMINOPHEN 500 MG: 500 TABLET ORAL at 11:06

## 2019-01-01 RX ADMIN — MEROPENEM AND SODIUM CHLORIDE 1 G: 1 INJECTION, SOLUTION INTRAVENOUS at 05:06

## 2019-01-01 RX ADMIN — MORPHINE SULFATE 2 MG: 2 INJECTION, SOLUTION INTRAMUSCULAR; INTRAVENOUS at 08:06

## 2019-01-01 RX ADMIN — SODIUM CHLORIDE: 0.9 INJECTION, SOLUTION INTRAVENOUS at 12:06

## 2019-01-01 RX ADMIN — AMLODIPINE BESYLATE 5 MG: 5 TABLET ORAL at 08:06

## 2019-01-01 RX ADMIN — LORAZEPAM 2 MG: 2 INJECTION, SOLUTION INTRAMUSCULAR; INTRAVENOUS at 04:06

## 2019-01-01 RX ADMIN — SODIUM CHLORIDE: 234 INJECTION INTRAMUSCULAR; INTRAVENOUS; SUBCUTANEOUS at 08:06

## 2019-01-01 RX ADMIN — MIDAZOLAM HYDROCHLORIDE 1 MG: 1 INJECTION, SOLUTION INTRAMUSCULAR; INTRAVENOUS at 05:06

## 2019-01-01 RX ADMIN — HEPARIN SODIUM 5000 UNITS: 5000 INJECTION, SOLUTION INTRAVENOUS; SUBCUTANEOUS at 05:06

## 2019-01-01 RX ADMIN — SODIUM CHLORIDE AND POTASSIUM CHLORIDE: .9; .15 SOLUTION INTRAVENOUS at 02:06

## 2019-01-01 RX ADMIN — AMLODIPINE BESYLATE 5 MG: 5 TABLET ORAL at 09:06

## 2019-01-01 RX ADMIN — LORAZEPAM 2 MG: 2 INJECTION, SOLUTION INTRAMUSCULAR; INTRAVENOUS at 09:06

## 2019-01-01 RX ADMIN — Medication 0.02 MCG/KG/MIN: at 06:09

## 2019-01-01 RX ADMIN — LEVETIRACETAM INJECTION 1500 MG: 15 INJECTION INTRAVENOUS at 03:06

## 2019-01-01 RX ADMIN — PANTOPRAZOLE SODIUM 40 MG: 40 TABLET, DELAYED RELEASE ORAL at 08:06

## 2019-01-01 RX ADMIN — SODIUM CHLORIDE: 234 INJECTION INTRAMUSCULAR; INTRAVENOUS; SUBCUTANEOUS at 06:06

## 2019-01-01 RX ADMIN — SODIUM CHLORIDE: 234 INJECTION INTRAMUSCULAR; INTRAVENOUS; SUBCUTANEOUS at 10:09

## 2019-01-01 RX ADMIN — FOLIC ACID: 5 INJECTION, SOLUTION INTRAMUSCULAR; INTRAVENOUS; SUBCUTANEOUS at 12:06

## 2019-01-01 RX ADMIN — SODIUM CHLORIDE: 234 INJECTION INTRAMUSCULAR; INTRAVENOUS; SUBCUTANEOUS at 07:09

## 2019-01-01 RX ADMIN — IPRATROPIUM BROMIDE 0.5 MG: 0.5 SOLUTION RESPIRATORY (INHALATION) at 07:09

## 2019-01-01 RX ADMIN — HEPARIN SODIUM 5000 UNITS: 5000 INJECTION, SOLUTION INTRAVENOUS; SUBCUTANEOUS at 04:06

## 2019-01-01 RX ADMIN — ONDANSETRON 4 MG: 2 INJECTION INTRAMUSCULAR; INTRAVENOUS at 11:06

## 2019-01-01 RX ADMIN — SODIUM CHLORIDE AND POTASSIUM CHLORIDE 75 ML/HR: 4.5; 1.49 INJECTION, SOLUTION INTRAVENOUS at 10:06

## 2019-01-01 RX ADMIN — LORAZEPAM 2 MG: 2 INJECTION, SOLUTION INTRAMUSCULAR; INTRAVENOUS at 12:06

## 2019-01-01 RX ADMIN — HYDRALAZINE HYDROCHLORIDE 10 MG: 20 INJECTION INTRAMUSCULAR; INTRAVENOUS at 11:06

## 2019-01-01 RX ADMIN — ROCURONIUM BROMIDE 50 MG: 10 INJECTION, SOLUTION INTRAVENOUS at 01:09

## 2019-01-01 RX ADMIN — MORPHINE SULFATE 2 MG: 2 INJECTION, SOLUTION INTRAMUSCULAR; INTRAVENOUS at 06:06

## 2019-01-01 RX ADMIN — SODIUM CHLORIDE: 0.9 INJECTION, SOLUTION INTRAVENOUS at 11:09

## 2019-01-01 RX ADMIN — DEXTROSE AND SODIUM CHLORIDE: 5; .45 INJECTION, SOLUTION INTRAVENOUS at 11:09

## 2019-01-01 RX ADMIN — HEPARIN SODIUM 5000 UNITS: 5000 INJECTION, SOLUTION INTRAVENOUS; SUBCUTANEOUS at 06:06

## 2019-01-01 RX ADMIN — LEVALBUTEROL HYDROCHLORIDE 1.25 MG: 1.25 SOLUTION, CONCENTRATE RESPIRATORY (INHALATION) at 07:09

## 2019-01-01 RX ADMIN — PIPERACILLIN AND TAZOBACTAM 4.5 G: 4; .5 INJECTION, POWDER, LYOPHILIZED, FOR SOLUTION INTRAVENOUS; PARENTERAL at 09:09

## 2019-01-01 RX ADMIN — LABETALOL HYDROCHLORIDE 10 MG: 5 INJECTION, SOLUTION INTRAVENOUS at 06:06

## 2019-01-01 RX ADMIN — ENOXAPARIN SODIUM 40 MG: 100 INJECTION SUBCUTANEOUS at 04:06

## 2019-01-01 RX ADMIN — LORAZEPAM 2 MG: 2 INJECTION, SOLUTION INTRAMUSCULAR; INTRAVENOUS at 01:06

## 2019-01-01 RX ADMIN — MEROPENEM AND SODIUM CHLORIDE 1 G: 1 INJECTION, SOLUTION INTRAVENOUS at 12:06

## 2019-01-01 RX ADMIN — DEXMEDETOMIDINE HYDROCHLORIDE 0.6 MCG/KG/HR: 100 INJECTION, SOLUTION, CONCENTRATE INTRAVENOUS at 01:06

## 2019-01-01 RX ADMIN — MORPHINE SULFATE 2 MG: 2 INJECTION, SOLUTION INTRAMUSCULAR; INTRAVENOUS at 11:06

## 2019-01-01 RX ADMIN — LEVETIRACETAM 500 MG: 5 INJECTION INTRAVENOUS at 09:06

## 2019-01-01 RX ADMIN — POTASSIUM CHLORIDE 40 MEQ: 20 SOLUTION ORAL at 06:06

## 2019-01-01 RX ADMIN — ENOXAPARIN SODIUM 40 MG: 100 INJECTION SUBCUTANEOUS at 05:06

## 2019-01-01 RX ADMIN — MAGNESIUM SULFATE IN WATER 2 G: 40 INJECTION, SOLUTION INTRAVENOUS at 04:06

## 2019-01-01 RX ADMIN — MORPHINE SULFATE 2 MG: 2 INJECTION, SOLUTION INTRAMUSCULAR; INTRAVENOUS at 01:06

## 2019-01-01 RX ADMIN — Medication 100 MG: at 09:06

## 2019-01-01 RX ADMIN — HALOPERIDOL LACTATE 5 MG: 5 INJECTION, SOLUTION INTRAMUSCULAR at 05:06

## 2019-01-01 RX ADMIN — OLANZAPINE 5 MG: 2.5 TABLET, FILM COATED ORAL at 09:06

## 2019-01-01 RX ADMIN — HEPARIN SODIUM 5000 UNITS: 5000 INJECTION, SOLUTION INTRAVENOUS; SUBCUTANEOUS at 01:06

## 2019-01-01 RX ADMIN — IPRATROPIUM BROMIDE 0.5 MG: 0.5 SOLUTION RESPIRATORY (INHALATION) at 03:09

## 2019-01-01 RX ADMIN — ALBUMIN (HUMAN) 25 G: 25 SOLUTION INTRAVENOUS at 04:09

## 2019-01-01 RX ADMIN — SODIUM CHLORIDE: 0.9 INJECTION, SOLUTION INTRAVENOUS at 05:06

## 2019-01-01 RX ADMIN — LORAZEPAM 2 MG: 2 INJECTION, SOLUTION INTRAMUSCULAR; INTRAVENOUS at 11:06

## 2019-01-01 RX ADMIN — FENTANYL CITRATE 25 MCG: 50 INJECTION INTRAMUSCULAR; INTRAVENOUS at 07:06

## 2019-01-01 RX ADMIN — VASOPRESSIN 0.1 UNITS/HR: 20 INJECTION INTRAVENOUS at 08:09

## 2019-01-01 RX ADMIN — OLANZAPINE 5 MG: 2.5 TABLET, FILM COATED ORAL at 11:06

## 2019-01-01 RX ADMIN — POTASSIUM CHLORIDE 40 MEQ: 10 INJECTION, SOLUTION INTRAVENOUS at 02:06

## 2019-01-01 RX ADMIN — PIPERACILLIN AND TAZOBACTAM 4.5 G: 4; .5 INJECTION, POWDER, LYOPHILIZED, FOR SOLUTION INTRAVENOUS; PARENTERAL at 09:06

## 2019-01-01 RX ADMIN — Medication 0.16 MCG/KG/MIN: at 11:09

## 2019-01-01 RX ADMIN — SODIUM CHLORIDE: 0.9 INJECTION, SOLUTION INTRAVENOUS at 04:06

## 2019-01-01 RX ADMIN — MORPHINE SULFATE 2 MG: 2 INJECTION, SOLUTION INTRAMUSCULAR; INTRAVENOUS at 12:06

## 2019-01-01 RX ADMIN — HEPARIN SODIUM 5000 UNITS: 5000 INJECTION, SOLUTION INTRAVENOUS; SUBCUTANEOUS at 09:06

## 2019-01-01 RX ADMIN — ACETAMINOPHEN 1000 MG: 10 INJECTION, SOLUTION INTRAVENOUS at 10:06

## 2019-01-01 RX ADMIN — HEPARIN SODIUM 5000 UNITS: 5000 INJECTION, SOLUTION INTRAVENOUS; SUBCUTANEOUS at 11:06

## 2019-01-01 RX ADMIN — NICARDIPINE HYDROCHLORIDE 5 MG/HR: 0.2 INJECTION, SOLUTION INTRAVENOUS at 09:09

## 2019-01-01 RX ADMIN — ACETAMINOPHEN 650 MG: 325 TABLET ORAL at 10:06

## 2019-01-01 RX ADMIN — IPRATROPIUM BROMIDE 0.5 MG: 0.5 SOLUTION RESPIRATORY (INHALATION) at 04:09

## 2019-01-01 RX ADMIN — MANNITOL 25 G: 12.5 INJECTION, SOLUTION INTRAVENOUS at 11:09

## 2019-01-01 RX ADMIN — MORPHINE SULFATE 2 MG: 2 INJECTION, SOLUTION INTRAMUSCULAR; INTRAVENOUS at 07:06

## 2019-01-01 RX ADMIN — IPRATROPIUM BROMIDE 0.5 MG: 0.5 SOLUTION RESPIRATORY (INHALATION) at 12:09

## 2019-01-01 RX ADMIN — IPRATROPIUM BROMIDE 0.5 MG: 0.5 SOLUTION RESPIRATORY (INHALATION) at 01:09

## 2019-01-01 RX ADMIN — FOLIC ACID 1 MG: 1 TABLET ORAL at 09:06

## 2019-01-01 RX ADMIN — LORAZEPAM 2 MG: 2 INJECTION, SOLUTION INTRAMUSCULAR; INTRAVENOUS at 08:06

## 2019-01-01 RX ADMIN — ALBUMIN (HUMAN): 12.5 SOLUTION INTRAVENOUS at 01:09

## 2019-01-01 RX ADMIN — HYDROCORTISONE SODIUM SUCCINATE 100 MG: 100 INJECTION, POWDER, FOR SOLUTION INTRAMUSCULAR; INTRAVENOUS at 01:09

## 2019-01-01 RX ADMIN — POTASSIUM CHLORIDE 10 MEQ: 7.46 INJECTION, SOLUTION INTRAVENOUS at 12:06

## 2019-01-01 RX ADMIN — NICARDIPINE HYDROCHLORIDE 2.5 MG/HR: 0.2 INJECTION, SOLUTION INTRAVENOUS at 06:06

## 2019-01-01 RX ADMIN — SODIUM POLYSTYRENE SULFONATE 30 G: 15 SUSPENSION ORAL; RECTAL at 04:09

## 2019-01-01 RX ADMIN — SODIUM CHLORIDE AND POTASSIUM CHLORIDE 75 ML/HR: 4.5; 1.49 INJECTION, SOLUTION INTRAVENOUS at 01:06

## 2019-01-01 RX ADMIN — LORAZEPAM 2 MG: 2 INJECTION, SOLUTION INTRAMUSCULAR; INTRAVENOUS at 03:06

## 2019-01-01 RX ADMIN — ONDANSETRON 4 MG: 2 INJECTION INTRAMUSCULAR; INTRAVENOUS at 03:06

## 2019-01-01 RX ADMIN — NICARDIPINE HYDROCHLORIDE 5 MG/HR: 0.2 INJECTION, SOLUTION INTRAVENOUS at 05:06

## 2019-01-01 RX ADMIN — NICARDIPINE HYDROCHLORIDE 5 MG/HR: 0.2 INJECTION, SOLUTION INTRAVENOUS at 03:06

## 2019-01-01 RX ADMIN — HALOPERIDOL LACTATE 5 MG: 5 INJECTION, SOLUTION INTRAMUSCULAR at 11:06

## 2019-01-01 RX ADMIN — FENTANYL CITRATE 50 MCG: 50 INJECTION, SOLUTION INTRAMUSCULAR; INTRAVENOUS at 05:06

## 2019-01-01 RX ADMIN — MORPHINE SULFATE 2 MG: 2 INJECTION, SOLUTION INTRAMUSCULAR; INTRAVENOUS at 02:06

## 2019-01-01 RX ADMIN — MIDAZOLAM HYDROCHLORIDE 2 MG: 1 INJECTION, SOLUTION INTRAMUSCULAR; INTRAVENOUS at 01:06

## 2019-01-01 RX ADMIN — PANTOPRAZOLE SODIUM 40 MG: 40 INJECTION, POWDER, FOR SOLUTION INTRAVENOUS at 09:09

## 2019-01-01 RX ADMIN — ACETAMINOPHEN 650 MG: 325 TABLET ORAL at 11:06

## 2019-01-01 RX ADMIN — POTASSIUM CHLORIDE 20 MEQ: 10 INJECTION, SOLUTION INTRAVENOUS at 06:06

## 2019-01-01 RX ADMIN — SODIUM CHLORIDE 1848 ML: 0.9 INJECTION, SOLUTION INTRAVENOUS at 06:06

## 2019-01-01 RX ADMIN — SODIUM CHLORIDE AND POTASSIUM CHLORIDE: .9; .15 SOLUTION INTRAVENOUS at 01:06

## 2019-01-01 RX ADMIN — LABETALOL HYDROCHLORIDE 5 MG: 5 INJECTION, SOLUTION INTRAVENOUS at 08:09

## 2019-01-01 RX ADMIN — ALBUMIN (HUMAN): 12.5 SOLUTION INTRAVENOUS at 11:09

## 2019-01-01 RX ADMIN — SODIUM CHLORIDE: 234 INJECTION INTRAMUSCULAR; INTRAVENOUS; SUBCUTANEOUS at 11:09

## 2019-01-01 RX ADMIN — MAGNESIUM SULFATE IN WATER 2 G: 40 INJECTION, SOLUTION INTRAVENOUS at 01:06

## 2019-01-01 RX ADMIN — MEROPENEM AND SODIUM CHLORIDE 1 G: 1 INJECTION, SOLUTION INTRAVENOUS at 01:06

## 2019-01-01 RX ADMIN — SODIUM CHLORIDE, SODIUM LACTATE, POTASSIUM CHLORIDE, AND CALCIUM CHLORIDE: .6; .31; .03; .02 INJECTION, SOLUTION INTRAVENOUS at 11:09

## 2019-01-01 RX ADMIN — SODIUM CHLORIDE, SODIUM LACTATE, POTASSIUM CHLORIDE, AND CALCIUM CHLORIDE: .6; .31; .03; .02 INJECTION, SOLUTION INTRAVENOUS at 01:09

## 2019-01-01 RX ADMIN — PIPERACILLIN AND TAZOBACTAM 4.5 G: 4; .5 INJECTION, POWDER, LYOPHILIZED, FOR SOLUTION INTRAVENOUS; PARENTERAL at 05:09

## 2019-01-01 RX ADMIN — Medication 0.22 MCG/KG/MIN: at 01:09

## 2019-01-01 RX ADMIN — POTASSIUM CHLORIDE 20 MEQ: 10 INJECTION, SOLUTION INTRAVENOUS at 11:06

## 2019-01-01 RX ADMIN — ACETAMINOPHEN 650 MG: 325 TABLET ORAL at 09:06

## 2019-01-01 RX ADMIN — POTASSIUM CHLORIDE 40 MEQ: 10 INJECTION, SOLUTION INTRAVENOUS at 03:06

## 2019-01-01 RX ADMIN — SODIUM CHLORIDE: 234 INJECTION INTRAMUSCULAR; INTRAVENOUS; SUBCUTANEOUS at 11:06

## 2019-01-01 RX ADMIN — POTASSIUM CHLORIDE AND DEXTROSE MONOHYDRATE: 150; 5 INJECTION, SOLUTION INTRAVENOUS at 04:09

## 2019-01-01 RX ADMIN — PANTOPRAZOLE SODIUM 40 MG: 40 TABLET, DELAYED RELEASE ORAL at 10:06

## 2019-01-01 RX ADMIN — LORAZEPAM 2 MG: 2 INJECTION, SOLUTION INTRAMUSCULAR; INTRAVENOUS at 10:06

## 2019-01-01 RX ADMIN — PANTOPRAZOLE SODIUM 40 MG: 40 INJECTION, POWDER, FOR SOLUTION INTRAVENOUS at 10:09

## 2019-01-01 RX ADMIN — POTASSIUM CHLORIDE AND DEXTROSE MONOHYDRATE: 150; 5 INJECTION, SOLUTION INTRAVENOUS at 08:09

## 2019-01-01 RX ADMIN — POTASSIUM CHLORIDE 40 MEQ: 14.9 INJECTION, SOLUTION INTRAVENOUS at 03:06

## 2019-01-01 RX ADMIN — POTASSIUM CHLORIDE 10 MEQ: 7.46 INJECTION, SOLUTION INTRAVENOUS at 01:06

## 2019-01-01 RX ADMIN — LEVALBUTEROL HYDROCHLORIDE 1.25 MG: 1.25 SOLUTION, CONCENTRATE RESPIRATORY (INHALATION) at 03:09

## 2019-01-01 RX ADMIN — CALCIUM GLUCONATE 1000 MG: 94 INJECTION, SOLUTION INTRAVENOUS at 04:09

## 2019-01-01 RX ADMIN — SODIUM CHLORIDE: 234 INJECTION INTRAMUSCULAR; INTRAVENOUS; SUBCUTANEOUS at 02:09

## 2019-01-01 RX ADMIN — PHENYLEPHRINE HYDROCHLORIDE 100 MCG: 10 INJECTION INTRAVENOUS at 01:09

## 2019-01-01 RX ADMIN — HYDROCORTISONE SODIUM SUCCINATE 100 MG: 100 INJECTION, POWDER, FOR SOLUTION INTRAMUSCULAR; INTRAVENOUS at 10:09

## 2019-01-01 RX ADMIN — MAGNESIUM OXIDE TAB 400 MG (241.3 MG ELEMENTAL MG) 400 MG: 400 (241.3 MG) TAB at 12:06

## 2019-01-01 RX ADMIN — POTASSIUM CHLORIDE 10 MEQ: 7.46 INJECTION, SOLUTION INTRAVENOUS at 11:06

## 2019-01-01 RX ADMIN — SODIUM CHLORIDE: 234 INJECTION INTRAMUSCULAR; INTRAVENOUS; SUBCUTANEOUS at 01:06

## 2019-01-01 RX ADMIN — IODIXANOL 150 ML: 320 INJECTION, SOLUTION INTRAVASCULAR at 06:06

## 2019-01-01 RX ADMIN — PIPERACILLIN AND TAZOBACTAM 4.5 G: 4; .5 INJECTION, POWDER, LYOPHILIZED, FOR SOLUTION INTRAVENOUS; PARENTERAL at 05:06

## 2019-01-01 RX ADMIN — HEPARIN SODIUM 30000 UNITS: 1000 INJECTION, SOLUTION INTRAVENOUS; SUBCUTANEOUS at 01:09

## 2019-01-01 RX ADMIN — BISACODYL 5 MG: 5 TABLET, COATED ORAL at 04:06

## 2019-01-01 RX ADMIN — SODIUM CHLORIDE AND POTASSIUM CHLORIDE 75 ML/HR: 4.5; 1.49 INJECTION, SOLUTION INTRAVENOUS at 12:06

## 2019-01-01 RX ADMIN — AMLODIPINE BESYLATE 5 MG: 5 TABLET ORAL at 10:06

## 2019-01-01 RX ADMIN — HEPARIN SODIUM 5000 UNITS: 5000 INJECTION, SOLUTION INTRAVENOUS; SUBCUTANEOUS at 10:06

## 2019-01-01 RX ADMIN — POTASSIUM CHLORIDE 10 MEQ: 7.46 INJECTION, SOLUTION INTRAVENOUS at 10:06

## 2019-01-01 RX ADMIN — IOHEXOL 100 ML: 350 INJECTION, SOLUTION INTRAVENOUS at 07:06

## 2019-01-01 RX ADMIN — MEROPENEM AND SODIUM CHLORIDE 1 G: 1 INJECTION, SOLUTION INTRAVENOUS at 02:06

## 2019-01-01 RX ADMIN — POTASSIUM CHLORIDE 40 MEQ: 20 SOLUTION ORAL at 02:06

## 2019-01-01 RX ADMIN — DIPHENHYDRAMINE HYDROCHLORIDE 25 MG: 50 INJECTION INTRAMUSCULAR; INTRAVENOUS at 06:06

## 2019-01-01 RX ADMIN — SODIUM CHLORIDE, SODIUM LACTATE, POTASSIUM CHLORIDE, AND CALCIUM CHLORIDE: .6; .31; .03; .02 INJECTION, SOLUTION INTRAVENOUS at 12:09

## 2019-01-01 RX ADMIN — SODIUM CHLORIDE: 234 INJECTION INTRAMUSCULAR; INTRAVENOUS; SUBCUTANEOUS at 08:09

## 2019-01-01 RX ADMIN — POTASSIUM CHLORIDE 60 MEQ: 10 INJECTION, SOLUTION INTRAVENOUS at 04:06

## 2019-01-01 RX ADMIN — Medication 12.5 G: at 03:09

## 2019-01-01 RX ADMIN — PIPERACILLIN AND TAZOBACTAM 4.5 G: 4; .5 INJECTION, POWDER, LYOPHILIZED, FOR SOLUTION INTRAVENOUS; PARENTERAL at 02:09

## 2019-01-01 RX ADMIN — ACETAMINOPHEN 500 MG: 500 TABLET ORAL at 08:06

## 2019-01-01 RX ADMIN — SODIUM CHLORIDE: 0.9 INJECTION, SOLUTION INTRAVENOUS at 04:09

## 2019-01-01 RX ADMIN — ACETAMINOPHEN 650 MG: 325 TABLET ORAL at 01:06

## 2019-01-01 RX ADMIN — ROCURONIUM BROMIDE 50 MG: 10 INJECTION, SOLUTION INTRAVENOUS at 11:09

## 2019-01-01 RX ADMIN — POTASSIUM CHLORIDE AND DEXTROSE MONOHYDRATE: 150; 5 INJECTION, SOLUTION INTRAVENOUS at 01:09

## 2019-01-01 RX ADMIN — IPRATROPIUM BROMIDE 0.5 MG: 0.5 SOLUTION RESPIRATORY (INHALATION) at 11:09

## 2019-01-01 RX ADMIN — ACETAMINOPHEN 1000 MG: 10 INJECTION, SOLUTION INTRAVENOUS at 01:06

## 2019-01-01 RX ADMIN — NICARDIPINE HYDROCHLORIDE 2.5 MG/HR: 0.2 INJECTION, SOLUTION INTRAVENOUS at 02:09

## 2019-01-01 RX ADMIN — FENTANYL CITRATE 25 MCG: 50 INJECTION, SOLUTION INTRAMUSCULAR; INTRAVENOUS at 07:06

## 2019-06-15 PROBLEM — R41.0 DELIRIUM: Status: ACTIVE | Noted: 2019-01-01

## 2019-06-16 PROBLEM — R41.0 ACUTE DELIRIUM: Status: ACTIVE | Noted: 2019-01-01

## 2019-06-16 PROBLEM — R09.89 JVD (JUGULAR VENOUS DISTENSION): Status: ACTIVE | Noted: 2019-01-01

## 2019-06-16 PROBLEM — E87.20 LACTIC ACIDOSIS: Status: ACTIVE | Noted: 2019-01-01

## 2019-06-16 NOTE — PLAN OF CARE
SW attempted to meet with pt for assessment. Pt unable to participate as he is not oriented.  Sitter at bedside verified he transferred from [facility].  No family/friends have been present.  Limited info was obtained from medical record for discharge assessment purpose.         06/16/19 3726   Discharge Assessment   Assessment Type Discharge Planning Assessment   Assessment information obtained from? Other;Medical Record  (sitter at bedside)   Prior to hospitilization cognitive status: Unable to Assess   Current cognitive status: Not Oriented to Place;Not Oriented to Person;Not Oriented to Time   Facility Arrived From: transfer from [facility] ([facility])   Lives With   (unknown)   Is patient able to care for self after discharge? Unable to determine at this time (comments)   Who are your caregiver(s) and their phone number(s)? unknown

## 2019-06-16 NOTE — CONSULTS
Nephrology Consult Note        Patient Name: Shahid Lackey  MRN: [id number]    Patient Class: IP- Inpatient   Admission Date: 6/15/2019  Length of Stay: 1 days  Date of Service: 6/16/2019    Attending Physician: Laura Allen MD  Primary Care Provider: Artem Rice MD    Reason for Consult:     SUBJECTIVE:     HPI: 33M with long history of drug use is admitted after feeling unwell after snorted some heroin. He was noted initially to be highly agitated and combative, later became cooperative but not answering questions appropriately. Received haldol, ativan and benadryl, was also given empiric vancomycin and rocephin for a white count of 26 and lactic acid of 8, received 3L NS. UDS positive for opiate, cocaine, amphetamine, THC and BZD.    He was transferred here for further management in the ICU. At the time of my evaluation the patient was obtunded and was not arousable. Did not appear to be in any acute distress.    History reviewed. No pertinent past medical history.  History reviewed. No pertinent surgical history.  Family History   Problem Relation Age of Onset    No Known Problems Mother     No Known Problems Father      Social History     Tobacco Use    Smoking status: Current Every Day Smoker     Packs/day: 1.00    Smokeless tobacco: Never Used   Substance Use Topics    Alcohol use: No    Drug use: Yes     Types: IV       Review of patient's allergies indicates:  No Known Allergies    Outpatient meds:  Current Facility-Administered Medications on File Prior to Encounter   Medication Dose Route Frequency Provider Last Rate Last Dose    [COMPLETED] 0.9%  NaCl infusion  1,000 mL Intravenous ED 1 Time Abel Kidd MD   Stopped at 06/15/19 1530    [COMPLETED] 0.9%  NaCl infusion  1,000 mL Intravenous ED 1 Time Abel Kidd MD   Stopped at 06/15/19 1546    [COMPLETED] cefTRIAXone (ROCEPHIN) 2 g in dextrose 5 % 50 mL IVPB  2 g Intravenous ED 1 Time Abel Kidd MD    Stopped at 06/15/19 1806    [COMPLETED] folic acid 1 mg in dextrose 5 % 100 mL IVPB  1 mg Intravenous Once Abel Kidd MD   Stopped at 06/15/19 1907    [COMPLETED] lorazepam injection 1 mg  1 mg Intravenous Q20 Min PRN Abel Kidd MD   1 mg at 06/15/19 2114    [COMPLETED] lorazepam injection 2 mg  2 mg Intravenous ED 1 Time Danielle Perez, FNP   2 mg at 06/15/19 1558    [COMPLETED] lorazepam injection 2 mg  2 mg Intravenous ED 1 Time Abel Kidd MD   2 mg at 06/15/19 1806    [COMPLETED] magnesium sulfate 2g in water 50mL IVPB (premix)  2 g Intravenous Once Abel Kidd MD   Stopped at 06/15/19 2110    [COMPLETED] thiamine injection 100 mg  100 mg Intravenous ED 1 Time Abel Kidd MD   100 mg at 06/15/19 1809    [COMPLETED] vancomycin 1.5 g in dextrose 5 % 250 mL IVPB (ready to mix)  1,500 mg Intravenous ED 1 Time Abel Kidd MD   Stopped at 06/15/19 1910    [DISCONTINUED] magnesium sulfate 2g in water 50mL IVPB (premix)  2 g Intravenous Q2H Abel Kidd MD        [DISCONTINUED] thiamine (B-1) 100 mg in dextrose 5 % 50 mL IVPB  100 mg Intravenous ED 1 Time Abel Kidd MD         No current outpatient medications on file prior to encounter.       Scheduled meds:   enoxaparin  40 mg Subcutaneous Daily    meropenem (MERREM) IVPB  1 g Intravenous Q8H    pantoprazole  40 mg Oral Daily    potassium chloride  20 mEq Intravenous Q6H       Infusions:   sodium chloride 0.9% 250 mL/hr at 06/16/19 1248       PRN meds:  acetaminophen, lorazepam, morphine, pneumoc 13-annette conj-dip cr(PF), sodium chloride 0.9%    Review of Systems:  Review of Systems   Unable to perform ROS: Acuity of condition       OBJECTIVE:     Vital Signs and IO (Last 24H):  Temp:  [98.7 °F (37.1 °C)-100.9 °F (38.3 °C)]   Pulse:  []   Resp:  [24-48]   BP: (114-166)/(57-96)   SpO2:  [73 %-100 %]   I/O last 3 completed shifts:  In: 140 [I.V.:140]  Out: 500  [Urine:500]    Wt Readings from Last 5 Encounters:   06/15/19 61.6 kg (135 lb 12.9 oz)   06/15/19 65.8 kg (145 lb)   03/11/19 65.8 kg (145 lb)   07/17/18 64.5 kg (142 lb 3.2 oz)         Physical Exam:  Physical Exam   Constitutional: He appears well-developed and well-nourished.   HENT:   Head: Normocephalic and atraumatic.   Mouth/Throat: Oropharynx is clear and moist.   Eyes: Pupils are equal, round, and reactive to light. EOM are normal. No scleral icterus.   Neck: Neck supple.   Cardiovascular: Normal rate and regular rhythm.   Pulmonary/Chest: Effort normal. No stridor. No respiratory distress.   Abdominal: Soft. He exhibits no distension.   Musculoskeletal: Normal range of motion. He exhibits no edema or deformity.   Neurological: No cranial nerve deficit.   Skin: Skin is warm and dry. No rash noted. He is not diaphoretic. No erythema.   Psychiatric: He has a normal mood and affect. His behavior is normal.       Body mass index is 19.49 kg/m².    Laboratory:  Recent Labs   Lab 06/15/19  1226 06/16/19  0112 06/16/19  0858   * 143 143   K 3.3* 3.6 3.5   * 110 115*   CO2 23 19* 18*   BUN 9 8 10   CREATININE 1.0 0.9 0.8   ESTGFRAFRICA >60.0 >60 >60   EGFRNONAA >60.0 >60 >60   CALCIUM 9.1 9.2 8.4*   ALBUMIN 4.4 4.2 3.8       Recent Labs   Lab 06/15/19  1226 06/16/19  0112   WBC 26.40* 24.00*   HGB 15.8 14.7   HCT 47.4 44.5   * 342   MCV 93 94   MCHC 33.3 33.1   MONO 5.1  1.4* 6.0  CANCELED       Recent Labs   Lab 06/15/19  1226 06/16/19  0112 06/16/19  0858   ALKPHOS 82 78 64   BILITOT 1.0 0.8 0.8   PROT 7.6 7.0 6.2   ALBUMIN 4.4 4.2 3.8   ALT 30 79* 108*   AST 54* 343* 418*       ASSESSMENT/PLAN:     Active Hospital Problems    Diagnosis  POA    Acute delirium [R41.0]  Unknown    Lactic acidosis [E87.2]  Unknown    JVD (jugular venous distension) [R09.89]  Unknown    Delirium [R41.0]  Yes    Leukocytosis [D72.829]  Yes      Resolved Hospital Problems   No resolved problems to display.      -Elevated CPK, consistent with rhabdomyolysis.   -Encephalopathy related to drugs.  -Elevated lactic acid levels, lactic acidosis due to liver dysfunction/rhabdomyolysis.  -CKD2  No NSAIDs, ACEI/ARB, IV contrast or other nephrotoxins.  Keep MAP > 60, SBP > 100.  Continue IVF, monitor labs 3-4 times per day.    Thank you for allowing us to participate in the care of your patient!   We will follow the patient and provide recommendations as needed.    Joel Chaney MD    [facility]  [address] [place] [address]  LAILA Moses [address]    [phone] - tel  [phone] - fax    6/16/2019 2:59 PM

## 2019-06-16 NOTE — ASSESSMENT & PLAN NOTE
- In the setting of polysubstance abuse  - U Tox positive for cocaine, amphetamines, opiates and THC.  - Received multiple doses of benzos in the ED, patient is currently obtunded.  - Closely monitor in the ICU and assess for withdrawal.

## 2019-06-16 NOTE — ASSESSMENT & PLAN NOTE
- No previously known cardiac disease.  - In the setting of drug use, will check an Echo to assess for possibility of endocarditis.

## 2019-06-16 NOTE — PROGRESS NOTES
Received pt, transfer from [place] to room 510. Pt shaking, speaks illogical words. Unable to open eyes or follow commands. 2 peripheral IVs. Lackey intact. Bruising noted, wounds on legs also noted. Pt arrived in 4 point soft wrist restraints. Pt still agitated and combative in bed, restraints continued.

## 2019-06-16 NOTE — EICU
eICU Note : New Admit :notified by the [facility]:transfer from [place] to room 510. Pt shaking, not making sense    Brief HPI:33-year-old male with history of heroin abuse and multiple opioid overdose was brought to the emergency room due to increasing agitation following snorting of an unknown drug.he received a total of 8 mg of Ativan for agitation and dystonia with improvement.    Vital Signs :   06/15/19 2315 06/15/19 2330 06/15/19 2345   BP: 131/68 132/71 135/78   Pulse: 66 (!) 58 63   Resp: (!) 29 (!) 25 (!) 25   SpO2: 99% 100% 100%         Camera Assessment :patient lying in bed    Data:WBC 26.4, hemoglobin 15.8, hematocrit 47.4, platelets 400  Sodium 135, potassium 3.3, chloride 100, CO2 23, BUN 9, creatinine 1.0, AST 54, ALT 30, , lactate 3.5  UDS positive for opiate, cocaine, amphetamine    Impression and recommendations:1.Drug overdose: etiology unclear. On Benzos  UDS positive for opiates, Benzos   2.: Rhabdomyolysis: IV Hydration   3. Transamnitis : Due to Rhabdo and Trend , check for hepatitis   4.Leucocytosis:WBC 24 K.R/o infection. Check Procalcitonin  5.PUD, DVT prophylaxis : SCD and PPI        Chhaya Tolentino M.D  eICU Physician

## 2019-06-16 NOTE — PLAN OF CARE
Problem: Adult Inpatient Plan of Care  Goal: Plan of Care Review  Outcome: Ongoing (interventions implemented as appropriate)  Poison Control called to check status of patient, VS, meds given, lab results.  Recommend checking electrolytes and kidney function, and LA , and CPK levels (until normal) at least every 6 hours.  Order placed to check labs every 6 hours, beginning at midnight tonight, having had labs collected this afternoon at 1700.  Continues to be agitated and restless with brief periods of rest.  Does respond to his name and simple questions, but is not lucid or oriented.  Receiving Precedex at 0.7 mcg/kg/hour, NS at 250 ml/hour.  Currently receiving 20meq KCL riders over 2 hours.  In NSR, RR in 30's, O2 Sats in high 90's on RA.  BP elevated at times, but tends to tighten arm with cuff inflates.  Ativan 2mg doses given frequently, per MAR; Morphine 2 mg given twice this shift.

## 2019-06-16 NOTE — ASSESSMENT & PLAN NOTE
- In the setting of polysubstance abuse.  - Lactate was 8 at presentation that improved to 3.5 with hydration.  - Will follow serially and hydrate as necessary.

## 2019-06-16 NOTE — ASSESSMENT & PLAN NOTE
- Likely secondary to the drug use.  - Got a dose of Vancomycin and Ceftriaxone in the ED.  - Will draw cultures.  - Follow CBC, likely will discontinue antibiotics if leucocytosis improves.

## 2019-06-16 NOTE — HPI
He is a 32 y/o male who presented to an outside ED after he had reportedly snorted some heroin. He got 5mg of Versed by the EMT after he was noted to be highly agitated and combative Upon arrival to emergency department, patient was cooperative but not answering questions appropriately.  Patient has a long standing h/o drug use. In the ED he received haldol, ativan and benadryl. He was also given empiric vancomycin and rocephin for a white count of 26 and lactic acid of 8. He also received 3L NS. UDS positive for opiate, cocaine, amphetamine, THC and BZD. He was given total 8 mg ativan for agitation and dystonia with reported improvement.  He was transferred here for further management in the ICU. At the time of my evaluation the patient was obtunded and was not arousable. Did not appear to be in any acute distress.

## 2019-06-16 NOTE — PROGRESS NOTES
Pt very diaphoretic, Axillary temp 99.3. Ice packs applied to under arms and cold rags to forehead.

## 2019-06-16 NOTE — PLAN OF CARE
Problem: Adult Inpatient Plan of Care  Goal: Plan of Care Review  Outcome: Ongoing (interventions implemented as appropriate)  POC reviewed, pt oriented to place and situation sometimes. Pt in 4 point soft restraints for safety of himself and others. Pt continually trying to bang his legs on side rails and attempting to pull gardiner catheter out. NS at 100ml/hr. Afebrile. Pt is diaphoretic, pupils will be dilated and a couple hours later pinpoint. Will monitor neuro status.

## 2019-06-16 NOTE — PROGRESS NOTES
06/16/19 0732   Patient Assessment/Suction   Level of Consciousness (AVPU) responds to voice   PRE-TX-O2   O2 Device (Oxygen Therapy) room air   SpO2 97 %   Pulse Oximetry Type Continuous   Pulse 93   Resp (!) 42   /67   Labs   $ Was an ABG obtained? ISTAT - Blood gas   $ Labs Tech Time 15 min   Critical Value Communication   Name of Notified Physician/Designee Homero Francis Rn   Date Result Received 06/16/19   Time Result Received 0732   Resulting Department of Critical Value resp   Who communicated critical value from resulting department? pgroue RRT   Critical Test #1 PCO2   Critical Test #1 Result 27.3   Date Notified 06/16/19   Time Notified 0735   Read Back Verification Yes

## 2019-06-16 NOTE — PROGRESS NOTES
Dr Allen beeped re: clinical picture with elevated CPK and elevated lactic acid. Call returned immediately states she will come to see pt as soon as she is finished with checkout.

## 2019-06-16 NOTE — PLAN OF CARE
Problem: Adult Inpatient Plan of Care  Goal: Plan of Care Review  Outcome: Ongoing (interventions implemented as appropriate)  Patient answers simple questions with one-word answrs, with clear speech, but is disoriented to time, place, situation.  Denies pain.  Is, however very diaphoretic, temp of 100.0 AX, is tachycardic, resp rate in 40's, very tremulous, pulling against restraints, jerking.  Pupils are very dilated and fixed.  Call to Dr. Allen placed by TIMMY Andersen Rn to come and evaluate.  Is coming.

## 2019-06-16 NOTE — H&P
[facility] Hospital Medicine  History & Physical    Patient Name: Shahid Lackey  MRN: [id number]  Admission Date: 6/15/2019  Attending Physician: Jay Nunes MD   Primary Care Provider: Artem Rice MD    Patient information was obtained from ER records.     Subjective:     Principal Problem:<principal problem not specified>    Chief Complaint: No chief complaint on file.       HPI: He is a 32 y/o male who presented to an outside ED after he had reportedly snorted some heroin. He got 5mg of Versed by the EMT after he was noted to be highly agitated and combative Upon arrival to emergency department, patient was cooperative but not answering questions appropriately.  Patient has a long standing h/o drug use. In the ED he received haldol, ativan and benadryl. He was also given empiric vancomycin and rocephin for a white count of 26 and lactic acid of 8. He also received 3L NS. UDS positive for opiate, cocaine, amphetamine, THC and BZD. He was given total 8 mg ativan for agitation and dystonia with reported improvement.  He was transferred here for further management in the ICU. At the time of my evaluation the patient was obtunded and was not arousable. Did not appear to be in any acute distress.      No past medical history on file.    No past surgical history on file.    Review of patient's allergies indicates:  No Known Allergies    Current Facility-Administered Medications on File Prior to Encounter   Medication    [COMPLETED] 0.9%  NaCl infusion    [COMPLETED] 0.9%  NaCl infusion    [COMPLETED] 0.9%  NaCl infusion    [COMPLETED] cefTRIAXone (ROCEPHIN) 2 g in dextrose 5 % 50 mL IVPB    [COMPLETED] diphenhydrAMINE injection 50 mg    [COMPLETED] folic acid 1 mg in dextrose 5 % 100 mL IVPB    [COMPLETED] haloperidol lactate injection 5 mg    [COMPLETED] lorazepam injection 1 mg    [COMPLETED] lorazepam injection 1 mg    [COMPLETED] lorazepam injection 1 mg    [COMPLETED]  lorazepam injection 2 mg    [COMPLETED] lorazepam injection 2 mg    [COMPLETED] lorazepam injection 2 mg    [COMPLETED] magnesium sulfate 2g in water 50mL IVPB (premix)    [COMPLETED] thiamine injection 100 mg    [COMPLETED] vancomycin 1.5 g in dextrose 5 % 250 mL IVPB (ready to mix)    [DISCONTINUED] lorazepam injection 2 mg    [DISCONTINUED] magnesium sulfate 2g in water 50mL IVPB (premix)    [DISCONTINUED] thiamine (B-1) 100 mg in dextrose 5 % 50 mL IVPB     Current Outpatient Medications on File Prior to Encounter   Medication Sig    [DISCONTINUED] clonazePAM (KLONOPIN) 0.5 MG tablet Take 0.5 mg by mouth 2 (two) times daily as needed for Anxiety.    [DISCONTINUED] FLUoxetine (PROZAC) 40 MG capsule Take 40 mg by mouth once daily.    [DISCONTINUED] gabapentin (NEURONTIN) 600 MG tablet Take 600 mg by mouth 3 (three) times daily.    [DISCONTINUED] naloxone (NARCAN) 4 mg/actuation Spry 4mg by nasal route as needed for opioid overdose; may repeat every 2-3 minutes in alternating nostrils until medical help arrives. Call 911     Family History     Problem Relation (Age of Onset)    No Known Problems Mother, Father        Tobacco Use    Smoking status: Current Every Day Smoker     Packs/day: 1.00    Smokeless tobacco: Never Used   Substance and Sexual Activity    Alcohol use: No    Drug use: Yes     Types: IV    Sexual activity: Not Currently     Review of Systems   Unable to perform ROS: Mental status change     Objective:     Vital Signs (Most Recent):    Vital Signs (24h Range):  Temp:  [98.1 °F (36.7 °C)] 98.1 °F (36.7 °C)  Pulse:  [] 121  Resp:  [18] 18  SpO2:  [97 %-100 %] 98 %  BP: (114-166)/(57-93) 141/79        There is no height or weight on file to calculate BMI.    Physical Exam   Constitutional: He appears well-developed and well-nourished. No distress.   HENT:   Head: Normocephalic.   Eyes: Right eye exhibits no discharge. Left eye exhibits no discharge. No scleral icterus.   Pupils  mid dilated, minimally responsive to light.   Neck: Neck supple. JVD present.   Cardiovascular: Normal rate, regular rhythm and normal heart sounds. Exam reveals no gallop and no friction rub.   No murmur heard.  Pulmonary/Chest: Effort normal and breath sounds normal. No stridor. No respiratory distress. He has no wheezes.   Abdominal: Soft. Bowel sounds are normal. He exhibits no distension. There is no tenderness. There is no guarding.   Musculoskeletal: He exhibits no edema or deformity.   Neurological:   Patient obtunded and currently unarousable. Unable to perform a detailed neurological exam.   Skin: Skin is warm and dry. He is not diaphoretic.   Multiple tattoos present.   Psychiatric:   Unable to assess.           Significant Labs:   A1C: No results for input(s): HGBA1C in the last 4320 hours.  ABGs: No results for input(s): PH, PCO2, HCO3, POCSATURATED, BE, TOTALHB, COHB, METHB, O2HB, POCFIO2 in the last 48 hours.  Bilirubin:   Recent Labs   Lab 06/15/19  1226   BILITOT 1.0     Blood Culture: No results for input(s): LABBLOO in the last 48 hours.  BMP:   Recent Labs   Lab 06/15/19  1226   *   *   K 3.3*   *   CO2 23   BUN 9   CREATININE 1.0   CALCIUM 9.1   MG 2.2     CBC:   Recent Labs   Lab 06/15/19  1226   WBC 26.40*   HGB 15.8   HCT 47.4   *     CMP:   Recent Labs   Lab 06/15/19  1226   *   K 3.3*   *   CO2 23   *   BUN 9   CREATININE 1.0   CALCIUM 9.1   PROT 7.6   ALBUMIN 4.4   BILITOT 1.0   ALKPHOS 82   AST 54*   ALT 30   ANIONGAP 12   EGFRNONAA >60.0     Cardiac Markers: No results for input(s): CKMB, MYOGLOBIN, BNP, TROPISTAT in the last 48 hours.  Coagulation: No results for input(s): PT, INR, APTT in the last 48 hours.  Lactic Acid:   Recent Labs   Lab 06/15/19  1316 06/15/19  1725   LACTATE 8.0* 3.5*     Lipase: No results for input(s): LIPASE in the last 48 hours.  Lipid Panel: No results for input(s): CHOL, HDL, LDLCALC, TRIG, CHOLHDL in the last 48  hours.  Magnesium:   Recent Labs   Lab 06/15/19  1226   MG 2.2       Significant Imaging: I have reviewed all pertinent imaging results/findings within the past 24 hours.    Assessment/Plan:     JVD (jugular venous distension)  - No previously known cardiac disease.  - In the setting of drug use, will check an Echo to assess for possibility of endocarditis.      Lactic acidosis  - In the setting of polysubstance abuse.  - Lactate was 8 at presentation that improved to 3.5 with hydration.  - Will follow serially and hydrate as necessary.      Acute delirium  - In the setting of polysubstance abuse  - U Tox positive for cocaine, amphetamines, opiates and THC.  - Received multiple doses of benzos in the ED, patient is currently obtunded.  - Closely monitor in the ICU and assess for withdrawal.      Leukocytosis  - Likely secondary to the drug use.  - Got a dose of Vancomycin and Ceftriaxone in the ED.  - Will draw cultures.  - Follow CBC, likely will discontinue antibiotics if leucocytosis improves.          VTE Risk Mitigation (From admission, onward)        Ordered     enoxaparin injection 40 mg  Daily      06/15/19 2342     Place sequential compression device  Until discontinued      06/15/19 2342     IP VTE HIGH RISK PATIENT  Once      06/15/19 2342        Critical care time spent on the evaluation and treatment of severe organ dysfunction, review of pertinent labs and imaging studies, discussions with consulting providers and discussions with patient/family: 50 minutes.     Jay Nunes MD  Department of Hospital Medicine   [facility]

## 2019-06-17 PROBLEM — R41.0 DELIRIUM: Status: RESOLVED | Noted: 2019-01-01 | Resolved: 2019-01-01

## 2019-06-17 PROBLEM — R50.9 FEVER: Status: ACTIVE | Noted: 2019-01-01

## 2019-06-17 PROBLEM — R09.89 JVD (JUGULAR VENOUS DISTENSION): Status: RESOLVED | Noted: 2019-01-01 | Resolved: 2019-01-01

## 2019-06-17 PROBLEM — M62.82 RHABDOMYOLYSIS: Status: ACTIVE | Noted: 2019-01-01

## 2019-06-17 NOTE — ASSESSMENT & PLAN NOTE
- In the setting of polysubstance abuse  - U Tox positive for cocaine, amphetamines, opiates and THC.      -the patient is now being treated with a Precedex drip, Ativan and morphine p.r.n.

## 2019-06-17 NOTE — PROGRESS NOTES
[facility] Hospital Medicine  Progress Note    Patient Name: Shahid Lackey  MRN: [id number]  Patient Class: IP- Inpatient   Admission Date: 6/15/2019  Length of Stay: 2 days  Attending Physician: Laura Allen MD  Primary Care Provider: Artem Rice MD        Subjective:     Principal Problem:Acute delirium  Acute Condition: Stable      HPI:  He is a 32 y/o male who presented to an outside ED after he had reportedly snorted some heroin. He got 5mg of Versed by the EMT after he was noted to be highly agitated and combative Upon arrival to emergency department, patient was cooperative but not answering questions appropriately.  Patient has a long standing h/o drug use. In the ED he received haldol, ativan and benadryl. He was also given empiric vancomycin and rocephin for a white count of 26 and lactic acid of 8. He also received 3L NS. UDS positive for opiate, cocaine, amphetamine, THC and BZD. He was given total 8 mg ativan for agitation and dystonia with reported improvement.  He was transferred here for further management in the ICU. At the time of my evaluation the patient was obtunded and was not arousable. Did not appear to be in any acute distress.      Overview/Hospital Course:  No notes on file    Interval History:  The patient is sedated    Review of Systems   Unable to perform ROS: Mental status change     Objective:     Vital Signs (Most Recent):  Temp: 98 °F (36.7 °C) (06/17/19 1230)  Pulse: (!) 45 (06/17/19 1230)  Resp: (!) 23 (06/17/19 1230)  BP: (!) 144/89 (06/17/19 1230)  SpO2: 99 % (06/17/19 1230) Vital Signs (24h Range):  Temp:  [98 °F (36.7 °C)-100.9 °F (38.3 °C)] 98 °F (36.7 °C)  Pulse:  [45-97] 45  Resp:  [22-47] 23  SpO2:  [91 %-100 %] 99 %  BP: (117-182)/() 144/89     Weight: 61.6 kg (135 lb 12.9 oz)  Body mass index is 19.49 kg/m².    Intake/Output Summary (Last 24 hours) at 6/17/2019 1344  Last data filed at 6/17/2019 1100  Gross per 24 hour   Intake  7598.29 ml   Output 5100 ml   Net 2498.29 ml      Physical Exam   Constitutional: He appears well-developed and well-nourished.   Cardiovascular: Normal rate, regular rhythm, normal heart sounds and intact distal pulses.   Pulmonary/Chest: Effort normal and breath sounds normal. No respiratory distress. He has no wheezes.   Abdominal: Soft. Bowel sounds are normal. He exhibits no distension. There is no tenderness.   Musculoskeletal: Normal range of motion. He exhibits no edema or tenderness.   Skin: Skin is warm and dry. Capillary refill takes less than 2 seconds. No rash noted.   Psychiatric: He has a normal mood and affect. His behavior is normal. Thought content normal.   Nursing note and vitals reviewed.      Significant Labs:   BMP:   Recent Labs   Lab 06/17/19  1208   *      K 3.3*      CO2 22*   BUN 11   CREATININE 0.7   CALCIUM 8.6*     CBC:   Recent Labs   Lab 06/16/19  0112 06/17/19  0557   WBC 24.00* 20.20*   HGB 14.7 14.9   HCT 44.5 44.4    275       Significant Imaging: I have reviewed all pertinent imaging results/findings within the past 24 hours.      Assessment/Plan:      * Acute delirium  - In the setting of polysubstance abuse  - U Tox positive for cocaine, amphetamines, opiates and THC.      -the patient is now being treated with a Precedex drip, Ativan and morphine p.r.n.    Rhabdomyolysis  The patient's CPK is much better today.  We have decreased the IV fluid rate.      Fever  Its possible that the fever and leukocytosis is related to drug withdrawal.  Cultures are not showing any growth.  Continue broad-spectrum antibiotics for now.      Lactic acidosis  Improving with rehydration and IV antibiotics      Leukocytosis  - Likely secondary to the drug use.  - Got a dose of Vancomycin and Ceftriaxone in the ED.  - Will draw cultures.  - Follow CBC, likely will discontinue antibiotics if leucocytosis improves.          VTE Risk Mitigation (From admission, onward)         Ordered     enoxaparin injection 40 mg  Daily      06/15/19 2342     Place sequential compression device  Until discontinued      06/15/19 2342     IP VTE HIGH RISK PATIENT  Once      06/15/19 2342          Critical care time spent on the evaluation and treatment of severe organ dysfunction, review of pertinent labs and imaging studies, discussions with consulting providers and discussions with patient/family: 30 minutes.      Laura Allen MD  Department of Hospital Medicine   [facility]

## 2019-06-17 NOTE — PLAN OF CARE
Problem: Adult Inpatient Plan of Care  Goal: Plan of Care Review  Outcome: Ongoing (interventions implemented as appropriate)  POC reviewed. Pt remains restless and agitated, moved around in bed and moans or yells out . Precedex at .7mcg/kg/hr. Normal saline at 250ml/hr. Ativan q1 hour PRN and Morphine q2 hour PRN given. Pt will sometimes respond if you shake him and call his name but will not answer questions or follow commands. Pupils dilated and brisk. Pt less diaphoretic but max temp 100.9 axillary. Pt did begin to melinda down around 4am and has done this about 6 times so far. Low rate does not sustain, NP notified. Pt free from injuries or falls, 4 point restraints for pt safety and prevent medical interference. Sitter at bedside.

## 2019-06-17 NOTE — PROGRESS NOTES
INPATIENT NEPHROLOGY PROGRESS NOTE  [facility] NEPHROLOGY    Patient Name: Shahid Lackey  Date: 06/17/2019    Reason for consultation: GRISEL    Chief Complaint: Malaise    History of Present Illness:  33M with long history of drug use is admitted after feeling unwell after snorted some heroin. He was noted initially to be highly agitated and combative, later became cooperative but not answering questions appropriately. Received haldol, ativan and benadryl, was also given empiric vancomycin and rocephin for a white count of 26 and lactic acid of 8, received 3L NS. CPK > 20K. UDS positive for opiate, cocaine, amphetamine, THC and BZD. He was transferred here for further management in the ICU. We are consulted for GRISEL.    · Interval History/Subjective:    - sedated due to combativeness    · Review of Systems: unable to obtain due to sedation    Plan of Care:    Assessment:  GRISEL  Drug induced (nontraumatic) rhabdomyolysis  Hypokalemia  Metabolic acidosis  Bradycardia/Hypertension    Plan:    - GRISEL has resolved.  - CK is trending down. Cut back NS to 100cc/hr.  - Ordered IV KCl 40mEQ x 1.  - Acidosis is resolved.  - CV parameters are likely 2/2 illicit drugs + sedatives- will monitor.     Thank you for allowing us to participate in this patient's care. We will continue to follow.    Medications:  No current facility-administered medications on file prior to encounter.      No current outpatient medications on file prior to encounter.     Scheduled Meds:   acetaminophen  1,000 mg Intravenous Q8H    enoxaparin  40 mg Subcutaneous Daily    meropenem (MERREM) IVPB  1 g Intravenous Q8H    pantoprazole  40 mg Oral Daily     Continuous Infusions:   sodium chloride 0.9% 250 mL/hr at 06/17/19 1202    [START ON 6/16/2020] dexmedetomidine (PRECEDEX) infusion 0.6 mcg/kg/hr (06/17/19 1215)     PRN Meds:.lorazepam, morphine, pneumoc 13-annette conj-dip cr(PF), sodium chloride 0.9%    Allergies:  Patient has no known  allergies.    Vital Signs:  Temp Readings from Last 3 Encounters:   06/17/19 98.6 °F (37 °C) (Axillary)   06/15/19 98.1 °F (36.7 °C) (Oral)   03/11/19 99.8 °F (37.7 °C) (Oral)       Pulse Readings from Last 3 Encounters:   06/17/19 87   06/15/19 (!) 121   03/11/19 96       BP Readings from Last 3 Encounters:   06/17/19 (!) 137/96   06/15/19 (!) 141/79   03/11/19 115/70       Weight:  Wt Readings from Last 3 Encounters:   06/15/19 61.6 kg (135 lb 12.9 oz)   06/15/19 65.8 kg (145 lb)   03/11/19 65.8 kg (145 lb)       INS/OUTS:  I/O last 3 completed shifts:  In: 6725.9 [I.V.:5875.9; IV Piggyback:850]  Out: 4525 [Urine:4525]  I/O this shift:  In: 1012.4 [I.V.:1012.4]  Out: 1300 [Urine:1300]    Physical Exam:  Constitutional: nad, sedated, nontoxic, appears stated age  Heart: rrr, no m/r/g, wwp, no edema  Lungs: ant ausc clear, no w/r/r/c, no lb  Abdomen: s/nt/nd, +BS    Results:  Lab Results   Component Value Date     06/17/2019    K 3.9 06/17/2019     06/17/2019    CO2 23 06/17/2019    BUN 10 06/17/2019    CREATININE 0.9 06/17/2019    CALCIUM 9.3 06/17/2019    ANIONGAP 12 06/17/2019    ESTGFRAFRICA >60 06/17/2019    EGFRNONAA >60 06/17/2019       Lab Results   Component Value Date    CALCIUM 9.3 06/17/2019       Recent Labs   Lab 06/17/19  0557   WBC 20.20*   RBC 4.81   HGB 14.9   HCT 44.4      MCV 92   MCH 30.9   MCHC 33.5       I have personally reviewed pertinent radiological imaging and reports.    Ligia Loaiza MD MPH  [facility]  [address] [place], [place] [address]LAILA Ron [address] [phone] (p)  [phone] (f)

## 2019-06-17 NOTE — ASSESSMENT & PLAN NOTE
Its possible that the fever and leukocytosis is related to drug withdrawal.  Cultures are not showing any growth.  Continue broad-spectrum antibiotics for now.

## 2019-06-17 NOTE — PLAN OF CARE
Patient sleeping; no family in room at this time.       06/17/19 1046   Discharge Reassessment   Assessment Type Discharge Planning Reassessment

## 2019-06-17 NOTE — PROGRESS NOTES
STEPHANIE Duarte notified. Pt melinda down multiple times. Low heart rate did not sustain. Will continue to monitor

## 2019-06-17 NOTE — PLAN OF CARE
Problem: Adult Inpatient Plan of Care  Goal: Plan of Care Review  Outcome: Ongoing (interventions implemented as appropriate)  Care plan reviewed. Safety maintained. Sitter at bedside. Restraints monitored per protocol. Mitten removed to right hand. ROM good on right side. Left side is rigid, but does eventually relax enough to perform with left leg, but not so much with left arm. Patient has multiple scabs/abrasions to legs and arms. Large bruise to left flank noted, also a healing bruise noted to left hip.   Patient COWS high score this shift is 34. Patient actively going through withdrawals, hallucinations, sweating, tremors, aguilar-erection, nausea, pain.   Patient medicated with Ativan and Morphine. Remains on Precedex at 0.7/hour. Patient is more verbal, yelling out for Karo, also yelling out profanities, unable to redirect, patient remains confused. Eating ice chips, no obvious problem swallowing.    IV antibiotics continue, patient afebrile this shift.

## 2019-06-17 NOTE — SUBJECTIVE & OBJECTIVE
Interval History:  The patient is sedated    Review of Systems   Unable to perform ROS: Mental status change     Objective:     Vital Signs (Most Recent):  Temp: 98 °F (36.7 °C) (06/17/19 1230)  Pulse: (!) 45 (06/17/19 1230)  Resp: (!) 23 (06/17/19 1230)  BP: (!) 144/89 (06/17/19 1230)  SpO2: 99 % (06/17/19 1230) Vital Signs (24h Range):  Temp:  [98 °F (36.7 °C)-100.9 °F (38.3 °C)] 98 °F (36.7 °C)  Pulse:  [45-97] 45  Resp:  [22-47] 23  SpO2:  [91 %-100 %] 99 %  BP: (117-182)/() 144/89     Weight: 61.6 kg (135 lb 12.9 oz)  Body mass index is 19.49 kg/m².    Intake/Output Summary (Last 24 hours) at 6/17/2019 1344  Last data filed at 6/17/2019 1100  Gross per 24 hour   Intake 7598.29 ml   Output 5100 ml   Net 2498.29 ml      Physical Exam   Constitutional: He appears well-developed and well-nourished.   Cardiovascular: Normal rate, regular rhythm, normal heart sounds and intact distal pulses.   Pulmonary/Chest: Effort normal and breath sounds normal. No respiratory distress. He has no wheezes.   Abdominal: Soft. Bowel sounds are normal. He exhibits no distension. There is no tenderness.   Musculoskeletal: Normal range of motion. He exhibits no edema or tenderness.   Skin: Skin is warm and dry. Capillary refill takes less than 2 seconds. No rash noted.   Psychiatric: He has a normal mood and affect. His behavior is normal. Thought content normal.   Nursing note and vitals reviewed.      Significant Labs:   BMP:   Recent Labs   Lab 06/17/19  1208   *      K 3.3*      CO2 22*   BUN 11   CREATININE 0.7   CALCIUM 8.6*     CBC:   Recent Labs   Lab 06/16/19  0112 06/17/19  0557   WBC 24.00* 20.20*   HGB 14.7 14.9   HCT 44.5 44.4    275       Significant Imaging: I have reviewed all pertinent imaging results/findings within the past 24 hours.

## 2019-06-18 NOTE — ASSESSMENT & PLAN NOTE
- In the setting of polysubstance abuse  - U Tox positive for cocaine, amphetamines, opiates and THC.      -the patient is now being treated with a Precedex drip, Ativan and morphine p.r.n.  -we will try Haldol as well

## 2019-06-18 NOTE — PLAN OF CARE
Problem: Adult Inpatient Plan of Care  Goal: Plan of Care Review  Outcome: Ongoing (interventions implemented as appropriate)  Plan of care reviewed.  Pt calling out all night, profane language directed to staff, not able to console or ease withdrawal symptoms.  Notifed Dr. Nunes of unrelieved agitation. One time order of 4mg of ativan ordered, no relief noted. Updated Dr. Nunes, and NP.  No new orders. Precedex infusing @ 0.7, max dose, NS with K+ 20meq infusing @ 100ml/hr, prn ativan, morphine, and zofran, no relief noted.  Pt in 4 point restraints,  @ 1341 today, gardiner draining 1375cc yellow urine. Pt has scabs, bruises, all over body. Bathed pt, changed linen, mouth care, 2 person assist. Safety maintained with frequent checks, no falls or injuries this shift, sitter at bedside.  Pt pec'd and expires @ 1415 today. Will continue to monitor closely.

## 2019-06-18 NOTE — ASSESSMENT & PLAN NOTE
Its possible that the fever and leukocytosis are related to drug withdrawal.  Cultures are not showing any growth.

## 2019-06-18 NOTE — PROGRESS NOTES
[facility] Hospital Medicine  Progress Note    Patient Name: Shahid Lackey  MRN: [id number]  Patient Class: IP- Inpatient   Admission Date: 6/15/2019  Length of Stay: 3 days  Attending Physician: Laura Allen MD  Primary Care Provider: Artem Rice MD        Subjective:     Principal Problem:Acute delirium  Acute Condition: Stable        HPI:  He is a 34 y/o male who presented to an outside ED after he had reportedly snorted some heroin. He got 5mg of Versed by the EMT after he was noted to be highly agitated and combative Upon arrival to emergency department, patient was cooperative but not answering questions appropriately.  Patient has a long standing h/o drug use. In the ED he received haldol, ativan and benadryl. He was also given empiric vancomycin and rocephin for a white count of 26 and lactic acid of 8. He also received 3L NS. UDS positive for opiate, cocaine, amphetamine, THC and BZD. He was given total 8 mg ativan for agitation and dystonia with reported improvement.  He was transferred here for further management in the ICU. At the time of my evaluation the patient was obtunded and was not arousable. Did not appear to be in any acute distress.      Overview/Hospital Course:  No notes on file    Interval History:  The patient is still confused.  He is oriented to self only    Review of Systems   Unable to perform ROS: Mental status change     Objective:     Vital Signs (Most Recent):  Temp: 98.1 °F (36.7 °C) (06/18/19 1130)  Pulse: 77 (06/18/19 1130)  Resp: (!) 30 (06/18/19 1130)  BP: (!) 153/73 (06/18/19 1130)  SpO2: 98 % (06/18/19 1130) Vital Signs (24h Range):  Temp:  [98 °F (36.7 °C)-98.7 °F (37.1 °C)] 98.1 °F (36.7 °C)  Pulse:  [40-85] 77  Resp:  [20-39] 30  SpO2:  [79 %-100 %] 98 %  BP: (111-196)/(64-94) 153/73     Weight: 61.8 kg (136 lb 3.9 oz)  Body mass index is 19.55 kg/m².    Intake/Output Summary (Last 24 hours) at 6/18/2019 1200  Last data filed at 6/18/2019  0530  Gross per 24 hour   Intake 2997.16 ml   Output 2350 ml   Net 647.16 ml      Physical Exam   Constitutional: He appears well-developed and well-nourished.   Cardiovascular: Regular rhythm, normal heart sounds and intact distal pulses.   Tachycardic   Pulmonary/Chest: Effort normal and breath sounds normal. No respiratory distress. He has no wheezes.   Abdominal: Soft. Bowel sounds are normal. He exhibits no distension. There is no tenderness.   Musculoskeletal: Normal range of motion. He exhibits no edema or tenderness.   Neurological: He is alert.   The patient is confused.  He is oriented to self only.  He gets very agitated and aggressive at times   Skin: Skin is warm and dry. Capillary refill takes less than 2 seconds. No rash noted.   Nursing note and vitals reviewed.      Significant Labs:   BMP:   Recent Labs   Lab 06/18/19  0342         K 3.8      CO2 22*   BUN 12   CREATININE 0.8   CALCIUM 8.8     CBC:   Recent Labs   Lab 06/17/19  0557 06/18/19  0342   WBC 20.20* 15.50*   HGB 14.9 13.3*   HCT 44.4 40.2    244       Significant Imaging: I have reviewed all pertinent imaging results/findings within the past 24 hours.      Assessment/Plan:      * Acute delirium  - In the setting of polysubstance abuse  - U Tox positive for cocaine, amphetamines, opiates and THC.      -the patient is now being treated with a Precedex drip, Ativan and morphine p.r.n.  -we will try Haldol as well      Rhabdomyolysis  Improving      Fever  Its possible that the fever and leukocytosis are related to drug withdrawal.  Cultures are not showing any growth.        Lactic acidosis  Resolved      Leukocytosis  The patient is afebrile and his white blood cell count is improving.  Blood cultures are not showing any growth.        VTE Risk Mitigation (From admission, onward)        Ordered     enoxaparin injection 40 mg  Daily      06/15/19 3142     Place sequential compression device  Until discontinued       06/15/19 2342     IP VTE HIGH RISK PATIENT  Once      06/15/19 2342          Critical care time spent on the evaluation and treatment of severe organ dysfunction, review of pertinent labs and imaging studies, discussions with consulting providers and discussions with patient/family: 30 minutes.      Laura Allen MD  Department of Hospital Medicine   [facility]

## 2019-06-18 NOTE — ASSESSMENT & PLAN NOTE
The patient is afebrile and his white blood cell count is improving.  Blood cultures are not showing any growth.

## 2019-06-18 NOTE — PROGRESS NOTES
INPATIENT NEPHROLOGY PROGRESS NOTE  [facility] NEPHROLOGY    Patient Name: Shahid Lackey  Date: 06/18/2019    Reason for consultation: GRISEL    Chief Complaint: Malaise    History of Present Illness:  33M with long history of drug use is admitted after feeling unwell after snorted some heroin. He was noted initially to be highly agitated and combative, later became cooperative but not answering questions appropriately. Received haldol, ativan and benadryl, was also given empiric vancomycin and rocephin for a white count of 26 and lactic acid of 8, received 3L NS. CPK > 20K. UDS positive for opiate, cocaine, amphetamine, THC and BZD. He was transferred here for further management in the ICU. We are consulted for GRISEL.    · Interval History/Subjective:    - melinda, intermittent hypertension, UOP 3.6L  - sedated due to combativeness again today    · Review of Systems: unable to obtain due to sedation    Plan of Care:    Assessment:  GRISEL  Drug induced (nontraumatic) rhabdomyolysis  Hypokalemia  Bradycardia/Hypertension  Nutrition    Plan:    - GRISEL remains resolved.  - Trend CK until < 5000. Change IVFs to 1/2NS with 20mEq KCl at 75cc/hr- prior fluid solution was hypertonic.   - K is improved- continue KCl containing IVFs- hope to be off by tomorrow.  - CV parameters are likely 2/2 illicit drugs + sedatives- will monitor.   - Advise starting TPN.     Thank you for allowing us to participate in this patient's care. We will continue to follow.    Medications:  No current facility-administered medications on file prior to encounter.      No current outpatient medications on file prior to encounter.     Scheduled Meds:   enoxaparin  40 mg Subcutaneous Daily    meropenem (MERREM) IVPB  1 g Intravenous Q8H    pantoprazole  40 mg Oral Daily     Continuous Infusions:   0/9% NACL & POTASSIUM CHLORIDE 20 MEQ/L 100 mL/hr at 06/18/19 0120    [START ON 6/16/2020] dexmedetomidine (PRECEDEX) infusion 0.7 mcg/kg/hr (06/18/19 0730)      PRN Meds:.lorazepam, morphine, ondansetron, pneumoc 13-annette conj-dip cr(PF), sodium chloride 0.9%    Allergies:  Patient has no known allergies.    Vital Signs:  Temp Readings from Last 3 Encounters:   06/18/19 98.7 °F (37.1 °C) (Axillary)   06/15/19 98.1 °F (36.7 °C) (Oral)   03/11/19 99.8 °F (37.7 °C) (Oral)       Pulse Readings from Last 3 Encounters:   06/18/19 72   06/15/19 (!) 121   03/11/19 96       BP Readings from Last 3 Encounters:   06/18/19 (!) 152/83   06/15/19 (!) 141/79   03/11/19 115/70       Weight:  Wt Readings from Last 3 Encounters:   06/18/19 61.8 kg (136 lb 3.9 oz)   06/15/19 65.8 kg (145 lb)   03/11/19 65.8 kg (145 lb)       INS/OUTS:  I/O last 3 completed shifts:  In: 7436.3 [I.V.:6586.3; IV Piggyback:850]  Out: 6300 [Urine:6300]  I/O this shift:  In: -   Out: 750 [Urine:750]    Physical Exam:  Constitutional: nad, sedated, nontoxic, appears stated age  Heart: rrr, no m/r/g, wwp, no edema  Lungs: ant ausc clear, no w/r/r/c, no lb  Abdomen: s/nt/nd, +BS    Results:  Lab Results   Component Value Date     06/18/2019    K 3.8 06/18/2019     06/18/2019    CO2 22 (L) 06/18/2019    BUN 12 06/18/2019    CREATININE 0.8 06/18/2019    CALCIUM 8.8 06/18/2019    ANIONGAP 11 06/18/2019    ESTGFRAFRICA >60 06/18/2019    EGFRNONAA >60 06/18/2019       Lab Results   Component Value Date    CALCIUM 8.8 06/18/2019       Recent Labs   Lab 06/18/19  0342   WBC 15.50*   RBC 4.34*   HGB 13.3*   HCT 40.2      MCV 93   MCH 30.7   MCHC 33.2       I have personally reviewed pertinent radiological imaging and reports.    Ligia Loaiza MD MPH  [facility] [address] [address]  [phone] (p)  [phone] (f)

## 2019-06-18 NOTE — SUBJECTIVE & OBJECTIVE
Interval History:  The patient is still confused.  He is oriented to self only    Review of Systems   Unable to perform ROS: Mental status change     Objective:     Vital Signs (Most Recent):  Temp: 98.1 °F (36.7 °C) (06/18/19 1130)  Pulse: 77 (06/18/19 1130)  Resp: (!) 30 (06/18/19 1130)  BP: (!) 153/73 (06/18/19 1130)  SpO2: 98 % (06/18/19 1130) Vital Signs (24h Range):  Temp:  [98 °F (36.7 °C)-98.7 °F (37.1 °C)] 98.1 °F (36.7 °C)  Pulse:  [40-85] 77  Resp:  [20-39] 30  SpO2:  [79 %-100 %] 98 %  BP: (111-196)/(64-94) 153/73     Weight: 61.8 kg (136 lb 3.9 oz)  Body mass index is 19.55 kg/m².    Intake/Output Summary (Last 24 hours) at 6/18/2019 1200  Last data filed at 6/18/2019 0530  Gross per 24 hour   Intake 2997.16 ml   Output 2350 ml   Net 647.16 ml      Physical Exam   Constitutional: He appears well-developed and well-nourished.   Cardiovascular: Regular rhythm, normal heart sounds and intact distal pulses.   Tachycardic   Pulmonary/Chest: Effort normal and breath sounds normal. No respiratory distress. He has no wheezes.   Abdominal: Soft. Bowel sounds are normal. He exhibits no distension. There is no tenderness.   Musculoskeletal: Normal range of motion. He exhibits no edema or tenderness.   Neurological: He is alert.   The patient is confused.  He is oriented to self only.  He gets very agitated and aggressive at times   Skin: Skin is warm and dry. Capillary refill takes less than 2 seconds. No rash noted.   Nursing note and vitals reviewed.      Significant Labs:   BMP:   Recent Labs   Lab 06/18/19  0342         K 3.8      CO2 22*   BUN 12   CREATININE 0.8   CALCIUM 8.8     CBC:   Recent Labs   Lab 06/17/19  0557 06/18/19  0342   WBC 20.20* 15.50*   HGB 14.9 13.3*   HCT 44.4 40.2    244       Significant Imaging: I have reviewed all pertinent imaging results/findings within the past 24 hours.

## 2019-06-19 PROBLEM — I61.1 NONTRAUMATIC CORTICAL HEMORRHAGE OF CEREBRAL HEMISPHERE: Status: ACTIVE | Noted: 2019-01-01

## 2019-06-19 PROBLEM — R74.01 TRANSAMINITIS: Status: ACTIVE | Noted: 2019-01-01

## 2019-06-19 PROBLEM — R53.1 WEAKNESS: Status: ACTIVE | Noted: 2019-01-01

## 2019-06-19 PROBLEM — R03.0 ELEVATED BLOOD PRESSURE READING: Status: ACTIVE | Noted: 2019-01-01

## 2019-06-19 PROBLEM — R63.8 ALTERATION IN NUTRITION: Status: ACTIVE | Noted: 2019-01-01

## 2019-06-19 PROBLEM — I61.9 NONTRAUMATIC INTRACEREBRAL HEMORRHAGE: Status: ACTIVE | Noted: 2019-01-01

## 2019-06-19 NOTE — PLAN OF CARE
Problem: Adult Inpatient Plan of Care  Goal: Plan of Care Review  Outcome: Ongoing (interventions implemented as appropriate)  Plan of care reviewed.   Pt CEC'd yesterday by . Pt calmer through shift.  Still calling out using profane language, but was less frequent compared to night before, prn haldol adm x 2, relief noted, prn morphine given x 2, relief noted.  Pt still sluggish with bending left leg, left foot, and left arm, rigid with tremors.  More diaphoretic compared with night before. Tolerated ice chips. When performing oral care, bit off piece of sponge applicator, used suction to remove, nadn. Pt still sedated on precedex 0.7, and 1/2 NS W/ 20meq KCL @ 75ml/hr.  4 point nonviolent soft wrist and ankle restraints, in place, with rt mitten. Bathed pt, chgd linen, oral care.  Lackey draining @ 850cc howard urine this shift. Safety maintained with frequent checks, sitter at bedside, no falls or injuries this shift. Maranda, pt's child's mother, called to check on pt, and Jeffrey, brother in law, called.  He would like Dr. Allen to call him.  Will pass along to dayshift. Will continue to monitor closely.

## 2019-06-19 NOTE — ASSESSMENT & PLAN NOTE
33 M with polysubstance abuse presents with large right parietooccipital ICH and IVH.  ICH score of 3.  -Admit to ncc for q 1 hr neuro checks  -SBP less than 140  -Na goal 145-155  -stat CTA  -Will consider EVD placement for ICP mgmt.

## 2019-06-19 NOTE — SUBJECTIVE & OBJECTIVE
Interval History:  The patient told me good morning but he was not able to answer any other questions.    Review of Systems   Unable to perform ROS: Mental status change     Objective:     Vital Signs (Most Recent):  Temp: 100.2 °F (37.9 °C) (06/19/19 0745)  Pulse: 70 (06/19/19 1000)  Resp: (!) 28 (06/19/19 1000)  BP: (!) 126/93 (06/19/19 1000)  SpO2: 99 % (06/19/19 1000) Vital Signs (24h Range):  Temp:  [98.4 °F (36.9 °C)-100.2 °F (37.9 °C)] 100.2 °F (37.9 °C)  Pulse:  [41-95] 70  Resp:  [19-33] 28  SpO2:  [97 %-100 %] 99 %  BP: (120-195)/() 126/93     Weight: 59.6 kg (131 lb 6.3 oz)  Body mass index is 18.85 kg/m².    Intake/Output Summary (Last 24 hours) at 6/19/2019 1139  Last data filed at 6/19/2019 0800  Gross per 24 hour   Intake 1861.7 ml   Output 2325 ml   Net -463.3 ml      Physical Exam   Constitutional: He appears well-developed and well-nourished.   Cardiovascular: Regular rhythm, normal heart sounds and intact distal pulses.   Tachycardic   Pulmonary/Chest: Effort normal and breath sounds normal. No respiratory distress. He has no wheezes.   Abdominal: Soft. Bowel sounds are normal. He exhibits no distension. There is no tenderness.   Musculoskeletal: Normal range of motion. He exhibits no edema or tenderness.   Neurological: He is alert.   The patient is more oriented today.    He appears to have left arm and left leg weakness.   Skin: Skin is warm and dry. Capillary refill takes less than 2 seconds. No rash noted.   Nursing note and vitals reviewed.      Significant Labs:   BMP:   Recent Labs   Lab 06/19/19  0540   GLU 97      K 3.5      CO2 21*   BUN 15   CREATININE 0.8   CALCIUM 8.8     CBC:   Recent Labs   Lab 06/18/19  0342 06/19/19  0540   WBC 15.50* 13.70*   HGB 13.3* 13.9*   HCT 40.2 41.2    294       Significant Imaging: I have reviewed all pertinent imaging results/findings within the past 24 hours.

## 2019-06-19 NOTE — NURSING
1330:  Pt off floor to CT.  Returned appr [phone]. [phone]:  Dr Allen already aware of results of CT.  Plan to transfer to [facility] with Dr Davis or [facility].  Orders received for keppra and cardene.  Carried out per MAR.      Pt still sleeping between care.  Hollers out often, at times it's obscenities.  Moving right upper and lower extremity, or attempting to past the restraints.  Left upper arm still postured in towards body with left lower leg extended out.  Unable to follow commands to squeeze hand or hold it up as he does right side.  Reoriented to place time and situation.

## 2019-06-19 NOTE — PLAN OF CARE
Problem: Adult Inpatient Plan of Care  Goal: Plan of Care Review  Pt transferred to main [place] for higher level of care.

## 2019-06-19 NOTE — ED NOTES
Patient arrived with sealed security bag marked with 1 ring inside bag, remains sealed at bedside at this time.

## 2019-06-19 NOTE — ASSESSMENT & PLAN NOTE
The patient has been hypertensive most likely related to his drug abuse.  It seems to be improving today therefore I will not start treatment with IV antihypertensives

## 2019-06-19 NOTE — CONSULTS
[facility]  Neurosurgery  Consult Note    Consults  Subjective:     Chief Complaint/Reason for Admission: ICH    History of Present Illness: 33 M presents for eval of large right IPH with IVH and midline shift.  He was at [facility] and was admitted for polysubstance abuse and was +for cocaine, heroin, THC, amphetamines.  Today he stopped moving his LUE and CT head showed large hemorrhage.  NSGY consulted for eval.      (Not in a hospital admission)    Review of patient's allergies indicates:  No Known Allergies    History reviewed. No pertinent past medical history.  History reviewed. No pertinent surgical history.  Family History     Problem Relation (Age of Onset)    No Known Problems Mother, Father        Tobacco Use    Smoking status: Current Every Day Smoker     Packs/day: 1.00    Smokeless tobacco: Never Used   Substance and Sexual Activity    Alcohol use: No    Drug use: Yes     Types: IV    Sexual activity: Not Currently     Review of Systems  Objective:     Weight: 59.4 kg (131 lb)  Body mass index is 18.8 kg/m².  Vital Signs (Most Recent):  Temp: 98.1 °F (36.7 °C) (06/19/19 1748)  Pulse: 97 (06/19/19 1748)  Resp: 20 (06/19/19 1748)  BP: 124/68 (06/19/19 1748)  SpO2: 100 % (06/19/19 1748) Vital Signs (24h Range):  Temp:  [98.1 °F (36.7 °C)-100.5 °F (38.1 °C)] 98.1 °F (36.7 °C)  Pulse:  [] 97  Resp:  [19-35] 20  SpO2:  [96 %-100 %] 100 %  BP: (109-175)/() 124/68     Date 06/19/19 0700 - 06/20/19 0659   Shift 9748-8798 7932-8954 3800-2942 24 Hour Total   INTAKE   Shift Total(mL/kg)       OUTPUT   Urine  50  50   Shift Total(mL/kg)  50(0.8)  50(0.8)   Weight (kg)  59.4 59.4 59.4                        Urethral Catheter 06/15/19 2005 Latex 16 Fr. (Active)   Site Assessment Clean;Intact 6/19/2019  5:52 PM   Collection Container Urimeter 6/19/2019  5:52 PM   Securement Method secured to top of thigh w/ adhesive device 6/19/2019  5:52 PM   Catheter Care Performed yes  "6/19/2019  5:52 PM   Reason for Continuing Urinary Catheterization Critically ill in ICU requiring intensive monitoring 6/19/2019  5:52 PM   CAUTI Prevention Bundle StatLock in place w 1" slack;Intact seal between catheter & drainage tubing;Drainage bag off the floor;Green sheeting clip in use;No dependent loops or kinks;Drainage bag not overfilled (<2/3 full);Drainage bag below bladder 6/19/2019  5:52 PM   Output (mL) 50 mL 6/19/2019  5:52 PM       Neurosurgery Physical Exam   E1V4M5  PERRL, tongue midline  Moves right spontaneously antigravity  LUE paretic, localizing in LLE  SILT    Significant Labs:  Recent Labs   Lab 06/18/19  1832 06/18/19  2358 06/19/19  0540   GLU 98 95 97    140 138   K 3.5 3.8 3.5    104 105   CO2 21* 22* 21*   BUN 13 14 15   CREATININE 0.8 0.8 0.8   CALCIUM 8.9 9.1 8.8     Recent Labs   Lab 06/18/19  0342 06/19/19  0540   WBC 15.50* 13.70*   HGB 13.3* 13.9*   HCT 40.2 41.2    294     Recent Labs   Lab 06/19/19  1445   INR 1.1   APTT 34.2*     Microbiology Results (last 7 days)     ** No results found for the last 168 hours. **            Significant Diagnostics:  CT head: reviewed    Assessment/Plan:     * Nontraumatic cortical hemorrhage of cerebral hemisphere  33 M with polysubstance abuse presents with large right parietooccipital ICH and IVH.  ICH score of 3.  -Admit to [facility] for q 1 hr neuro checks  -SBP less than 140  -Na goal 145-155  -stat CTA  -Will consider EVD placement for ICP mgmt.            Priyank Garza, DO  Neurosurgery  [facility]  "

## 2019-06-19 NOTE — ED TRIAGE NOTES
"Shahdi Lackey, a 33 y.o. male presents to the ED via EMS transfer from [facility] with CC left side defits onset "sometime last night" onset and LSN unknown. Arrived to [facility] after overdose, PEC's per [facility]. Arrived with Cardene infusing at 5mg hr and precedex infusing at 0.6, both infusions paused per MD order. Patient also arrived with 1/2 ND with 20mEq of K infusing, stopped per Dr Lam verbal order with read back.    Patient identifiers verified verbally with patient EMS and armband and correct for Shahid Lackey.    LOC/ APPEARANCE: The patient is lethargic, aroused with repeated loud verbal stimuli. Slurred speech noted. Pt arrived in hospital gown. Continuous cardiac monitor, cont pulse ox, and auto BP cuff applied to patient. Pt updated on POC. Bed low and locked with side rails up x2, call bell in pt reach.  SKIN: Skin is warm dry and intact, and color is consistent with ethnicity. Capillary refill <3 seconds. Scattered bruising and scabs throughout all extremities. Mucus membranes dry , acyanotic. Patient arrived with uyen to right hand due to scratching.  RESPIRATORY: Airway is open and patent. Respirations-spontaneous, unlabored, regular rate, equal bilaterally on inspiration and expiration. No accessory muscle use noted. Lungs clear to auscultation in all fields bilaterally anterior and posterior.   CARDIAC: Patient has regular heart rate. No peripheral edema noted, and patient has no c/o chest pain. Peripheral pulses present equal and strong throughout.  ABDOMEN: Soft and non-tender to palpation with no distention noted.   NEUROLOGIC: SEE NEURO ASSESSMENT FLOW SHEET  : Lackey catheter in place on arrival.    "

## 2019-06-19 NOTE — PLAN OF CARE
Problem: Adult Inpatient Plan of Care  Goal: Plan of Care Review  Outcome: Ongoing (interventions implemented as appropriate)  Pt. Continues to yell out throughout shift. Continues on precidex 0.7 noting that pt. Remains restless and continues to yell out. Pt. Cooperative with verbal commands. PERRLA present although pt. Keeps eyes closed. Pt. Has very limited movement noted to the left arm and leg (will not bend extremities).  Continues to have gardiner. Music therapy appears to help patient relax. Restraints remain on patient X4 extremities. Sitter at bedside throughout shift. No BM noted throughout shift. Pt. Sweating and is very damp throughout sift despite cleaning and changing of patient. Pt. Aware of self only.

## 2019-06-19 NOTE — PROGRESS NOTES
INPATIENT NEPHROLOGY PROGRESS NOTE  [facility] NEPHROLOGY    Patient Name: Shahid Lackey  Date: 06/19/2019    Reason for consultation: GRISEL    Chief Complaint: Malaise    History of Present Illness:  33M with long history of drug use is admitted after feeling unwell after snorted some heroin. He was noted initially to be highly agitated and combative, later became cooperative but not answering questions appropriately. Received haldol, ativan and benadryl, was also given empiric vancomycin and rocephin for a white count of 26 and lactic acid of 8, received 3L NS. CPK > 20K. UDS positive for opiate, cocaine, amphetamine, THC and BZD. He was transferred here for further management in the ICU. We are consulted for GRISEL.    · Interval History/Subjective:    - melinda, intermittent hypertension, on RA, UOP 2.1L  - sedated due to agitation    · Review of Systems: unable to obtain due to sedatives    Plan of Care:    Assessment:  GRISEL- resolved  Drug induced (nontraumatic) rhabdomyolysis  Hypokalemia  Bradycardia/Hypertension  Nutrition    Plan:    - Trend CK until < 5000. Continue 1/2NS with 20mEq KCl at 75cc/hr until he has definitive nutrition plan.   - Ordered extra dose of IV KCl 40mEq x 1.  - CV parameters are likely 2/2 illicit drugs + sedatives- will monitor.   - Advise starting TPN if he cannot tolerate oral diet from a mental status standpoint.     Thank you for allowing us to participate in this patient's care. We will continue to follow.    Medications:  No current facility-administered medications on file prior to encounter.      No current outpatient medications on file prior to encounter.     Scheduled Meds:   enoxaparin  40 mg Subcutaneous Daily    meropenem (MERREM) IVPB  1 g Intravenous Q8H    pantoprazole  40 mg Oral Daily     Continuous Infusions:   0.45% NaCl with KCl 20 mEq infusion 75 mL/hr (06/19/19 0017)    [START ON 6/16/2020] dexmedetomidine (PRECEDEX) infusion 0.7 mcg/kg/hr (06/19/19 4103)      PRN Meds:.diphenhydrAMINE, haloperidol lactate, lorazepam, morphine, ondansetron, pneumoc 13-annette conj-dip cr(PF), sodium chloride 0.9%    Allergies:  Patient has no known allergies.    Vital Signs:  Temp Readings from Last 3 Encounters:   06/19/19 100.2 °F (37.9 °C) (Axillary)   06/15/19 98.1 °F (36.7 °C) (Oral)   03/11/19 99.8 °F (37.7 °C) (Oral)       Pulse Readings from Last 3 Encounters:   06/19/19 85   06/15/19 (!) 121   03/11/19 96       BP Readings from Last 3 Encounters:   06/19/19 (!) 161/80   06/15/19 (!) 141/79   03/11/19 115/70       Weight:  Wt Readings from Last 3 Encounters:   06/19/19 59.6 kg (131 lb 6.3 oz)   06/15/19 65.8 kg (145 lb)   03/11/19 65.8 kg (145 lb)       INS/OUTS:  I/O last 3 completed shifts:  In: 3307.9 [I.V.:3057.9; IV Piggyback:250]  Out: 3525 [Urine:3525]  I/O this shift:  In: -   Out: 175 [Urine:175]    Physical Exam:  Constitutional: nad, arousable but not really alert, ill appearing but not toxic, appears stated age  Heart: rrr, no m/r/g, wwp, no edema  Lungs: ant ausc clear, no w/r/r/c, no lb  Abdomen: s/nt/nd, +BS    Results:  Lab Results   Component Value Date     06/19/2019    K 3.5 06/19/2019     06/19/2019    CO2 21 (L) 06/19/2019    BUN 15 06/19/2019    CREATININE 0.8 06/19/2019    CALCIUM 8.8 06/19/2019    ANIONGAP 12 06/19/2019    ESTGFRAFRICA >60 06/19/2019    EGFRNONAA >60 06/19/2019       Lab Results   Component Value Date    CALCIUM 8.8 06/19/2019       Recent Labs   Lab 06/19/19  0540   WBC 13.70*   RBC 4.48*   HGB 13.9*   HCT 41.2      MCV 92   MCH 31.0   MCHC 33.8       I have personally reviewed pertinent radiological imaging and reports.    Ligia Loaiza MD MPH  [facility] [address] [address] [phone] (p)  [phone] (f)

## 2019-06-19 NOTE — ED NOTES
Art line attempted x2 by MD unsuccessful.    Pt arrived on dirty linens, linens changed and pt cleaned.

## 2019-06-19 NOTE — SUBJECTIVE & OBJECTIVE
"  (Not in a hospital admission)    Review of patient's allergies indicates:  No Known Allergies    History reviewed. No pertinent past medical history.  History reviewed. No pertinent surgical history.  Family History     Problem Relation (Age of Onset)    No Known Problems Mother, Father        Tobacco Use    Smoking status: Current Every Day Smoker     Packs/day: 1.00    Smokeless tobacco: Never Used   Substance and Sexual Activity    Alcohol use: No    Drug use: Yes     Types: IV    Sexual activity: Not Currently     Review of Systems  Objective:     Weight: 59.4 kg (131 lb)  Body mass index is 18.8 kg/m².  Vital Signs (Most Recent):  Temp: 98.1 °F (36.7 °C) (06/19/19 1748)  Pulse: 97 (06/19/19 1748)  Resp: 20 (06/19/19 1748)  BP: 124/68 (06/19/19 1748)  SpO2: 100 % (06/19/19 1748) Vital Signs (24h Range):  Temp:  [98.1 °F (36.7 °C)-100.5 °F (38.1 °C)] 98.1 °F (36.7 °C)  Pulse:  [] 97  Resp:  [19-35] 20  SpO2:  [96 %-100 %] 100 %  BP: (109-175)/() 124/68     Date 06/19/19 0700 - 06/20/19 0659   Shift 4604-5576 1591-5273 1220-5663 24 Hour Total   INTAKE   Shift Total(mL/kg)       OUTPUT   Urine  50  50   Shift Total(mL/kg)  50(0.8)  50(0.8)   Weight (kg)  59.4 59.4 59.4                        Urethral Catheter 06/15/19 2005 Latex 16 Fr. (Active)   Site Assessment Clean;Intact 6/19/2019  5:52 PM   Collection Container Urimeter 6/19/2019  5:52 PM   Securement Method secured to top of thigh w/ adhesive device 6/19/2019  5:52 PM   Catheter Care Performed yes 6/19/2019  5:52 PM   Reason for Continuing Urinary Catheterization Critically ill in ICU requiring intensive monitoring 6/19/2019  5:52 PM   CAUTI Prevention Bundle StatLock in place w 1" slack;Intact seal between catheter & drainage tubing;Drainage bag off the floor;Green sheeting clip in use;No dependent loops or kinks;Drainage bag not overfilled (<2/3 full);Drainage bag below bladder 6/19/2019  5:52 PM   Output (mL) 50 mL 6/19/2019  5:52 PM "       Neurosurgery Physical Exam   E1V4M5  PERRL, tongue midline  Moves right spontaneously antigravity  LUE paretic, localizing in LLE  SILT    Significant Labs:  Recent Labs   Lab 06/18/19  1832 06/18/19  2358 06/19/19  0540   GLU 98 95 97    140 138   K 3.5 3.8 3.5    104 105   CO2 21* 22* 21*   BUN 13 14 15   CREATININE 0.8 0.8 0.8   CALCIUM 8.9 9.1 8.8     Recent Labs   Lab 06/18/19  0342 06/19/19  0540   WBC 15.50* 13.70*   HGB 13.3* 13.9*   HCT 40.2 41.2    294     Recent Labs   Lab 06/19/19  1445   INR 1.1   APTT 34.2*     Microbiology Results (last 7 days)     ** No results found for the last 168 hours. **            Significant Diagnostics:  CT head: reviewed

## 2019-06-19 NOTE — ASSESSMENT & PLAN NOTE
The WBC is improving however the patient had a low-grade temp overnight.  Continue cefepime and repeat blood cultures.

## 2019-06-19 NOTE — NURSING
1608:  No beds available at [facility]. Plan to transfer to [facility] [place].  Report given to Amador in ER.  Transport set up per transfer center andEMS at bedside to transfer pt.  IVF, precedex, and cardene remain infusing and on pump to be transferred with patient.  Family to be updated per Dr Allen.

## 2019-06-19 NOTE — SUBJECTIVE & OBJECTIVE
History reviewed. No pertinent past medical history.  History reviewed. No pertinent surgical history.   Current Facility-Administered Medications on File Prior to Encounter   Medication Dose Route Frequency Provider Last Rate Last Dose    [COMPLETED] levETIRAcetam in NaCl (iso-os) IVPB 1,500 mg  1,500 mg Intravenous Once Laura Allen MD   1,500 mg at 06/19/19 1543    [COMPLETED] potassium chloride 10 mEq in 100 mL IVPB  10 mEq Intravenous Q1H Ligia Loaiza  mL/hr at 06/19/19 1330 10 mEq at 06/19/19 1330    [DISCONTINUED] 0.45% NaCl with KCl 20 mEq infusion  75 mL/hr Intravenous Continuous Laura Allen MD 75 mL/hr at 06/19/19 1330 75 mL/hr at 06/19/19 1330    [DISCONTINUED] dexMEDEtomidine (PRECEDEX) 400 mcg in sodium chloride 0.9% 100 mL infusion  0.2 mcg/kg/hr Intravenous Continuous Laura Allen MD 9.2 mL/hr at 06/19/19 0940 0.6 mcg/kg/hr at 06/19/19 0940    [DISCONTINUED] dexMEDEtomidine (PRECEDEX) 400 mcg in sodium chloride 0.9% 100 mL infusion  0.2 mcg/kg/hr Intravenous Continuous Laura Allen MD 9.2 mL/hr at 06/19/19 1329 0.6 mcg/kg/hr at 06/19/19 1329    [DISCONTINUED] diphenhydrAMINE injection 25 mg  25 mg Intravenous Q6H PRN Jaret Schmidt MD   25 mg at 06/19/19 0638    [DISCONTINUED] enoxaparin injection 40 mg  40 mg Subcutaneous Daily Jay Nunes MD   40 mg at 06/18/19 1653    [DISCONTINUED] haloperidol lactate injection 5 mg  5 mg Intravenous Q6H PRN Laura Allen MD   5 mg at 06/19/19 1222    [DISCONTINUED] levETIRAcetam in NaCl (iso-os) IVPB 1,000 mg  1,000 mg Intravenous Q12H Laura Allen MD        [DISCONTINUED] lorazepam injection 2 mg  2 mg Intravenous Q1H PRN Laura Allen MD   2 mg at 06/19/19 1436    [DISCONTINUED] meropenem-0.9% sodium chloride 1 g/50 mL IVPB  1 g Intravenous Q8H Laura Allen MD 50 mL/hr at 06/19/19 1222 1 g at 06/19/19 1222    [DISCONTINUED] morphine injection 2 mg  2 mg Intravenous Q2H PRN Laura YI  MD Alejandro   2 mg at 06/19/19 1032    [DISCONTINUED] niCARdipine 40 mg/200 mL infusion  5 mg/hr Intravenous Continuous Laura Allen MD        [DISCONTINUED] niCARdipine 40 mg/200 mL infusion  5 mg/hr Intravenous Continuous Laura Allen MD 25 mL/hr at 06/19/19 1501 5 mg/hr at 06/19/19 1501    [DISCONTINUED] ondansetron injection 4 mg  4 mg Intravenous Q6H PRN Laura Allen MD   4 mg at 06/17/19 2342    [DISCONTINUED] pantoprazole EC tablet 40 mg  40 mg Oral Daily Jay Nunes MD   40 mg at 06/19/19 1032    [DISCONTINUED] pneumoc 13-annette conj-dip cr(PF) (PREVNAR 13 (PF)) 0.5 mL  0.5 mL Intramuscular vaccine x 1 dose Jay Nunes MD        [DISCONTINUED] potassium chloride 20 mEq in 100 mL IVPB (FOR CENTRAL LINE ADMINISTRATION ONLY)  40 mEq Intravenous Once Ligia Loaiza MD        [DISCONTINUED] sodium chloride 0.9% flush 10 mL  10 mL Intravenous PRN Jay Nunes MD         No current outpatient medications on file prior to encounter.      Allergies: Patient has no known allergies.    Family History   Problem Relation Age of Onset    No Known Problems Mother     No Known Problems Father      Social History     Tobacco Use    Smoking status: Current Every Day Smoker     Packs/day: 1.00    Smokeless tobacco: Never Used   Substance Use Topics    Alcohol use: No    Drug use: Yes     Types: IV     Review of Systems   Unable to perform ROS: Mental status change     Objective:     Vitals:    Temp: 98.1 °F (36.7 °C)  Pulse: 92  BP: 138/68  MAP (mmHg): 95  Resp: 16  SpO2: 98 %  O2 Device (Oxygen Therapy): room air    Temp  Min: 98.1 °F (36.7 °C)  Max: 100.5 °F (38.1 °C)  Pulse  Min: 41  Max: 106  BP  Min: 109/71  Max: 175/102  MAP (mmHg)  Min: 85  Max: 129  Resp  Min: 15  Max: 35  SpO2  Min: 96 %  Max: 100 %    No intake/output data recorded.           Physical Exam   Constitutional: He appears well-developed and well-nourished. No distress.   HENT:   Head: Normocephalic and atraumatic.    Eyes: Pupils are equal, round, and reactive to light. EOM are normal.   Cardiovascular: Normal rate and regular rhythm.   Pulmonary/Chest: Effort normal and breath sounds normal. No respiratory distress.   Abdominal: Soft. Bowel sounds are normal. He exhibits no distension.   Musculoskeletal: He exhibits no edema or deformity.   Skin: Skin is warm and dry.     Neuro:  --sedation: none  --GCS:  E1 V4 M6  --Mental Status: lethargic, oriented to person and place only  --CN II-XII grossly intact.   --PERRL -3mm  --Motor: L side hemiplegia, LUE withdraw, LLE no movement, R side spontaneous and localizes  --sensory: intact to pain    Today I personally reviewed pertinent medications, lines/drains/airways, imaging, cardiology results, laboratory results, microbiology results, notably:    CTH - ICH

## 2019-06-19 NOTE — HPI
33 M presents for eval of large right IPH with IVH and midline shift.  He was at [facility] and was admitted for polysubstance abuse and was +for cocaine, heroin, THC, amphetamines.  Today he stopped moving his LUE and CT head showed large hemorrhage.  NSGY consulted for eval.

## 2019-06-19 NOTE — ED NOTES
PEC and CEC received in Central[facility]. No placement assistance needed, patient is being admitted.

## 2019-06-19 NOTE — ED PROVIDER NOTES
Encounter Date: 6/19/2019    SCRIBE #1 NOTE: I, Jennifer Pratt, am scribing for, and in the presence of,  Dr. Lam . I have scribed the entire note.       History     Chief Complaint   Patient presents with    TRANSFER     head bleed with shift from [facility]     Mr. Shahid Lackey with no known medical history presents with IPH w/ IVH. Pt was transferred from [facility] after he was noted to have a head bleed. He was originally brought in to [facility] because he was unresponsive after overdosing on cocaine, meth, THC, and heroine. He was then transferred and admitted to [facility] ICU as he had PEC in place for suspicion for suicidal intent. Sometime this morning, a nurse noticed that he was not using his left side and a head CT was ordered. It showed massive ICH and he was transferred here for higher level care. He is complaining of headache only.    The history is provided by the EMS personnel and medical records. The history is limited by the condition of the patient (Altered Mental Staus ).     Review of patient's allergies indicates:  No Known Allergies  Past Medical History:   Diagnosis Date    Current smoker     Drug abuse, IV      Past Surgical History:   Procedure Laterality Date    INSERT ARTERIAL LINE  6/20/2019          Family History   Problem Relation Age of Onset    No Known Problems Mother     No Known Problems Father      Social History     Tobacco Use    Smoking status: Current Every Day Smoker     Packs/day: 1.00    Smokeless tobacco: Never Used   Substance Use Topics    Alcohol use: No    Drug use: Yes     Types: IV     Review of Systems   Unable to perform ROS: Mental status change       Physical Exam     Initial Vitals [06/19/19 1748]   BP Pulse Resp Temp SpO2   124/68 97 20 98.1 °F (36.7 °C) 100 %      MAP       --         Physical Exam    Nursing note and vitals reviewed.  Constitutional: He appears well-developed and well-nourished. He appears lethargic. He is not  diaphoretic. No distress.   HENT:   Head: Normocephalic and atraumatic.   Right Ear: External ear normal.   Left Ear: External ear normal.   Nose: Nose normal.   Mouth/Throat: Oropharynx is clear and moist.   Eyes: Pupils are equal, round, and reactive to light.   Neck: Neck supple.   Cardiovascular: Normal rate, regular rhythm and normal heart sounds.   Pulmonary/Chest: Breath sounds normal. No respiratory distress. He has no wheezes. He has no rhonchi. He has no rales.   Abdominal: Soft. He exhibits no distension. There is no tenderness. There is no rebound and no guarding.   Neurological: He appears lethargic. He is disoriented. Cranial nerve deficit: FRANCES. He exhibits abnormal muscle tone (L side). GCS eye subscore is 1. GCS verbal subscore is 3. GCS motor subscore is 5.   Moves LUE and LLE to deep pain stimulus only, and withdraws to that. Can intermittently follow commands w/ RUE/RLE.   Skin: Skin is warm and dry. Capillary refill takes less than 2 seconds. No rash noted.         ED Course   Procedures  Labs Reviewed   CBC W/ AUTO DIFFERENTIAL - Abnormal; Notable for the following components:       Result Value    WBC 15.63 (*)     Immature Granulocytes 0.6 (*)     Gran # (ANC) 13.0 (*)     Immature Grans (Abs) 0.10 (*)     Mono # 1.4 (*)     Gran% 83.3 (*)     Lymph% 6.9 (*)     All other components within normal limits   COMPREHENSIVE METABOLIC PANEL - Abnormal; Notable for the following components:    CO2 20 (*)     Total Bilirubin 1.1 (*)      (*)      (*)     All other components within normal limits   CK - Abnormal; Notable for the following components:    CPK 2529 (*)     All other components within normal limits   URINALYSIS, REFLEX TO URINE CULTURE - Abnormal; Notable for the following components:    Ketones, UA 3+ (*)     Occult Blood UA 2+ (*)     All other components within normal limits    Narrative:     Preferred Collection Type->Urine, Clean Catch   LIPID PANEL - Abnormal; Notable  for the following components:    Triglycerides 163 (*)     HDL 31 (*)     All other components within normal limits    Narrative:     add on GHGB order [id number] per Dr. Shon Lam  06/19/2019    19:08  add on TSH [id number] LIPID [id number]  per Dr. Shon Lam    06/19/2019  19:19    URINALYSIS MICROSCOPIC - Abnormal; Notable for the following components:    RBC, UA 8 (*)     All other components within normal limits    Narrative:     Preferred Collection Type->Urine, Clean Catch   CK - Abnormal; Notable for the following components:    CPK 2015 (*)     All other components within normal limits   ISTAT PROCEDURE - Abnormal; Notable for the following components:    POC PH 7.519 (*)     POC PCO2 30.2 (*)     POC PO2 40 (*)     POC SATURATED O2 82 (*)     All other components within normal limits   CULTURE, RESPIRATORY   PROTIME-INR   APTT   HEMOGLOBIN A1C   LIPID PANEL   TSH   HEMOGLOBIN A1C    Narrative:     add on GHGB order [id number] per Dr. Shon Lam  06/19/2019    19:08  add on TSH [id number] LIPID [id number]  per Dr. Shon Lam    06/19/2019  19:19    TSH    Narrative:     add on GHGB order [id number] per Dr. Shon Lam  06/19/2019    19:08  add on TSH [id number] LIPID [id number]  per Dr. Shon Lam    06/19/2019  19:19    SODIUM   TYPE & SCREEN          Imaging Results           CTA Head and Neck (xpd) (Final result)  Result time 06/19/19 21:30:05    Final result by Stanley Tapia MD (06/19/19 21:30:05)                 Impression:      Acute intraparenchymal hematoma centered within right and left parietal lobes with extension across the corpus callosum.  Volume of hemorrhage is stable to slightly progressed in comparison to prior exam.  Slightly progressed edema and mass effect resulting in 8 cm leftward midline shift.  No new hemorrhage.    Stable dilatation of the temporal horns of lateral ventricles consistent mild hydrocephalus.    Small volume subdural hemorrhage  layering along the falx and tentorium, unchanged    No evidence of large intracranial aneurysm or AVM.  Please note evaluation is limited secondary to extensive hemorrhage and mass effect.  Consider cerebral angiography for further evaluation, as clinically indicated.    This report was flagged in Epic as abnormal.    Electronically signed by resident: Edenilson Mosher  Date:    06/19/2019  Time:    20:32    Electronically signed by: Stanley Tapia MD  Date:    06/19/2019  Time:    21:30             Narrative:    EXAMINATION:  CTA HEAD AND NECK (XPD)    CLINICAL HISTORY:  eval for avm    TECHNIQUE:  Non contrast low dose axial images were obtained through the head. CT angiogram was performed from the level of the ana to the top of the head following the IV administration of 100mL of Omnipaque 350.   Sagittal and coronal reconstructions and maximum intensity projection reconstructions were performed. Arterial stenosis percentages are based on NASCET measurement criteria.    COMPARISON:  CT head 06/19/2019    FINDINGS:  Intracranial Compartment:    Large acute intraparenchymal hematoma centered within the right parietal lobe measuring approximately 4.7 x 3.9 x 5.0 cm (transverse, AP, craniocaudal).  Hemorrhage extends along the corpus callosum into the left parietal lobe or a separate hemorrhage collection measures approximately 4.7 x 2.0 x 2.8 cm (AP, transverse, craniocaudal).  Volume of hemorrhage appears stable to slightly enlarged in comparison to prior exam.  Significant laurie hemorrhage edema and mass effect with sulcal effacement and compression of the occipital horns of the lateral ventricles.  Approximately 8 mm of leftward midline shift, slightly progressed from prior.  Stable dilatation of the temporal horns of the lateral ventricles consistent with developing hydrocephalus.  Subdural hemorrhage identified layering along the midline falx and tentorium, unchanged.  No new hemorrhage identified.  No  parenchymal mass identified.    No displaced calvarial fracture.  Paranasal sinuses mastoid air cells are clear.    Non-Vascular Structures of the Neck/Thoracic Inlet (limited evaluation): Normal.    Aorta: Normal 3 vessel arch.    Extracranial carotid circulation: No hemodynamically significant stenosis, aneurysmal dilatation, or dissection.    Extracranial vertebral circulation: No hemodynamically significant stenosis, aneurysmal dilatation, or dissection.    Intracranial Arteries: No focal high-grade stenosis, occlusion, or aneurysm.    Venous structures (limited evaluation): Normal.                                 Medical Decision Making:   History:   Old Medical Records: I decided to obtain old medical records.  Old Records Summarized: records from clinic visits and records from previous admission(s).  Clinical Tests:   Lab Tests: Ordered and Reviewed  ED Management:  Vitals normal. Afebrile. Here w/ ICH w/ IVH. BP controlled on arrival without medication. Given that on repeat evaluation, no has no elevations of BP, will defer a line. Protecting airway for now; no indication for intubation.  Labs repeated as he has been admitted for rhabdomyolysis as well.     Labs reviewed; grossly WNL w/o actionable values except mild elevations in AST and ALT. CK 2500 (improving).  Discussed with nsgy who evaluated pt.  Discussed with Neuro ICU who admitted pt.  Other:   I have discussed this case with another health care provider.       <> Summary of the Discussion: Neurosurgery. Neuro ICU.             Scribe Attestation:   Scribe #1: I performed the above scribed service and the documentation accurately describes the services I performed. I attest to the accuracy of the note.    Attending Attestation:         Attending Critical Care:   Critical Care Times:   ==============================================================  · Total Critical Care Time - exclusive of procedural time: 30  minutes.  ==============================================================  Critical care reasons: ICH.   Critical care was time spent personally by me on the following activities: obtaining history from patient or relative, examination of patient, review of x-rays / CT sent with the patient, review of old charts, ordering lab, x-rays, and/or EKG, ordering and performing treatments and interventions, evaluation of patient's response to treatment, discussion with consultants, re-evaluation of patient's conition and interpretation of cardiac measurements.   Critical Care Condition: critical                  Clinical Impression:       ICD-10-CM ICD-9-CM   1. Nontraumatic intracerebral hemorrhage, unspecified cerebral location, unspecified laterality I61.9 431   2. IVH (intraventricular hemorrhage) I61.5 431   3. Non-traumatic rhabdomyolysis M62.82 728.88   4. Elevated transaminase level R74.0 790.4   5. ICH (intracerebral hemorrhage) I61.9 431   6. Nontraumatic cortical hemorrhage of right cerebral hemisphere I61.1 431   7. Left hemiparesis G81.94 342.90   8. Intracranial hemorrhage I62.9 432.9   9. Leukocytosis, unspecified type D72.829 288.60         Disposition:   Disposition: Admitted  Condition: Serious                        Shon Lam MD  06/20/19 5074

## 2019-06-19 NOTE — PROGRESS NOTES
When bathing pt, noticed new onset rash covering most of back down to trunk. Pt also complained that his back hurt. Notified Dr. Schmidt. See new orders.

## 2019-06-19 NOTE — PROGRESS NOTES
[facility] Hospital Medicine  Progress Note    Patient Name: Shahid Lackey  MRN: [id number]  Patient Class: IP- Inpatient   Admission Date: 6/15/2019  Length of Stay: 4 days  Attending Physician: Laura Allen MD  Primary Care Provider: Artem Rice MD        Subjective:     Principal Problem:Acute delirium  Acute Condition: Stable      HPI:  He is a 32 y/o male who presented to an outside ED after he had reportedly snorted some heroin. He got 5mg of Versed by the EMT after he was noted to be highly agitated and combative Upon arrival to emergency department, patient was cooperative but not answering questions appropriately.  Patient has a long standing h/o drug use. In the ED he received haldol, ativan and benadryl. He was also given empiric vancomycin and rocephin for a white count of 26 and lactic acid of 8. He also received 3L NS. UDS positive for opiate, cocaine, amphetamine, THC and BZD. He was given total 8 mg ativan for agitation and dystonia with reported improvement.  He was transferred here for further management in the ICU. At the time of my evaluation the patient was obtunded and was not arousable. Did not appear to be in any acute distress.      Overview/Hospital Course:  33-year-old male with a history of polysubstance abuse was admitted June 15th with delirium, rhabdomyolysis, lactic acidosis and leukocytosis.    He was admitted to the intensive care unit and was treated with IV fluids.  He has rhabdo is improving.  His renal function has been stable.    He was also placed on cefepime after we obtained urine blood and blood cultures.  Chest x-ray did not show pneumonia.  He continues to have low-grade fever but his white blood cell count is improving.     The patient's delirium is improving.  He has been treated with benzodiazepines, morphine, Haldol and Precedex drip.  We are starting to wean him off those medications.    He has left-sided weakness today and I have  ordered a CT scan of the brain without contrast.        Interval History:  The patient told me good morning but he was not able to answer any other questions.    Review of Systems   Unable to perform ROS: Mental status change     Objective:     Vital Signs (Most Recent):  Temp: 100.2 °F (37.9 °C) (06/19/19 0745)  Pulse: 70 (06/19/19 1000)  Resp: (!) 28 (06/19/19 1000)  BP: (!) 126/93 (06/19/19 1000)  SpO2: 99 % (06/19/19 1000) Vital Signs (24h Range):  Temp:  [98.4 °F (36.9 °C)-100.2 °F (37.9 °C)] 100.2 °F (37.9 °C)  Pulse:  [41-95] 70  Resp:  [19-33] 28  SpO2:  [97 %-100 %] 99 %  BP: (120-195)/() 126/93     Weight: 59.6 kg (131 lb 6.3 oz)  Body mass index is 18.85 kg/m².    Intake/Output Summary (Last 24 hours) at 6/19/2019 1139  Last data filed at 6/19/2019 0800  Gross per 24 hour   Intake 1861.7 ml   Output 2325 ml   Net -463.3 ml      Physical Exam   Constitutional: He appears well-developed and well-nourished.   Cardiovascular: Regular rhythm, normal heart sounds and intact distal pulses.   Tachycardic   Pulmonary/Chest: Effort normal and breath sounds normal. No respiratory distress. He has no wheezes.   Abdominal: Soft. Bowel sounds are normal. He exhibits no distension. There is no tenderness.   Musculoskeletal: Normal range of motion. He exhibits no edema or tenderness.   Neurological: He is alert.   The patient is more oriented today.    He appears to have left arm and left leg weakness.   Skin: Skin is warm and dry. Capillary refill takes less than 2 seconds. No rash noted.   Nursing note and vitals reviewed.      Significant Labs:   BMP:   Recent Labs   Lab 06/19/19  0540   GLU 97      K 3.5      CO2 21*   BUN 15   CREATININE 0.8   CALCIUM 8.8     CBC:   Recent Labs   Lab 06/18/19  0342 06/19/19  0540   WBC 15.50* 13.70*   HGB 13.3* 13.9*   HCT 40.2 41.2    294       Significant Imaging: I have reviewed all pertinent imaging results/findings within the past 24  hours.      Assessment/Plan:      * Acute delirium  The patient is improving and we will try to wean him off the Precedex drip.    The patient is committed.  We will consult Psychiatry when his mental status is back to baseline.      Rhabdomyolysis  Improving with IV fluids.  I appreciate nephrologie's recommendations.      Alteration in nutrition  The patient has been NPO because of altered mental status but seems to be improving today and he may be able to start eating soon      Elevated blood pressure reading  The patient has been hypertensive most likely related to his drug abuse.  It seems to be improving today therefore I will not start treatment with IV antihypertensives      Weakness  The patient is not moving his left arm and leg today.  I will obtain a CT scan of the brain.      Fever  Its possible that the fever and leukocytosis are related to drug withdrawal.  Cultures are not showing any growth.        Lactic acidosis  Resolved      Leukocytosis  The WBC is improving however the patient had a low-grade temp overnight.  Continue cefepime and repeat blood cultures.       I discussed his condition with his ex-girlfriend who was at the bedside.    VTE Risk Mitigation (From admission, onward)        Ordered     enoxaparin injection 40 mg  Daily      06/15/19 2342     Place sequential compression device  Until discontinued      06/15/19 2342     IP VTE HIGH RISK PATIENT  Once      06/15/19 2342          Critical care time spent on the evaluation and treatment of severe organ dysfunction, review of pertinent labs and imaging studies, discussions with consulting providers and discussions with patient/family: 30 minutes.      The CT scan of the brain shows a large intracerebral hemorrhage. I spoke with Dr. Davis and we will transfer the patient either to [facility] or [facility] for neurosurgical evaluation.  In the meantime the patient will be treated with IV Keppra and IV Cardene.      Laura ZULUAGA  MD Alejandro  Department of Hospital Medicine   [facility]

## 2019-06-19 NOTE — NURSING
Rounding on patient.  Sleeping.  Sweating profusely.  Sheets and gown changed.  Aware of self confused of situation and time.  Attempted to reorient.  Water given and tolerated with out difficulty.  Awakes to sound of voice.  Answers simple questions.  Follows simple commands to right side.  Keeps both arms flexed to core.  Not able to follow commands to left upper extremity.  Left leg is rigid and straight.  Bends to painful stimuli.  Denied numbness.  Some right eye twitch noted.    Temp this AM of 100.2.  Dr Allen aware.  Plan to attempt to wean down sedation and try PO intake.  Per Dr Allen.     If does ok with PO intake, can stop the IV fluids per Dr Rajput.

## 2019-06-19 NOTE — ED NOTES
Cardene restarted at 2.5mg/hr to maintain SBP <140 per Neuro ICU, Neuro ICU MD states will place order in epic.

## 2019-06-19 NOTE — ASSESSMENT & PLAN NOTE
The patient has been NPO because of altered mental status but seems to be improving today and he may be able to start eating soon

## 2019-06-19 NOTE — PLAN OF CARE
06/19/19 1101   Discharge Assessment   Assessment Type Discharge Planning Assessment     1043  Patient remains in ICU on precedex gtt, and in restraints.  Per sitter, patient is a little more alert than yesterday.  Will attempt a full assessment tomorrow if patient is more alert.       06/19/19 1101   Discharge Assessment   Assessment Type Discharge Planning Assessment

## 2019-06-19 NOTE — HOSPITAL COURSE
33-year-old male with a history of polysubstance abuse was admitted June 15th with delirium, rhabdomyolysis, lactic acidosis and leukocytosis.    He was admitted to the intensive care unit and was treated with IV fluids. The rhabdo is improving and his renal function has been stable.  Nephrology has been following.    He was  placed on cefepime after we obtained urine and blood cultures which are not showing any growth.  Chest x-ray did not show pneumonia.  He had low-grade fever overnight but his white blood cell count is improving.     The patient's delirium is improving.  He has been treated with benzodiazepines, morphine, Haldol and Precedex drip.  We are starting to wean him off those medications.    He has left-sided weakness today and I ordered a CT scan of the brain without contrast which shows a very large intracerebral hemorrhage.  I spoke to neurosurgery and the patient will need to be transferred either to [facility] or [facility] for neuro surgical evaluation and treatment.  Will be loaded with Keppra and placed on IV Cardene to keep his systolic blood pressure less than 140.  He had been receiving  Lovenox 40 mg subcu daily for DVT prophylaxis.  This has been discontinued.  He has not received aspirin or NSAIDs.

## 2019-06-19 NOTE — HPI
Pt is a 33 y.o. Male with Hx of IVDU who presents for  large right IPH with IVH and midline shift.  Pt was at [facility] and was admitted for polysubstance abuse and was +for cocaine, heroin, THC, amphetamines. Pt was subsequently PEC'd at [facility]. Today he stopped moving his Left side. CTH was done at [facility] and showed large IPH with IVH. Pt was transferred to [facility] for monitoring in ICU, Neurosurgery consulted.

## 2019-06-19 NOTE — ASSESSMENT & PLAN NOTE
The patient is improving and we will try to wean him off the Precedex drip.    The patient is committed.  We will consult Psychiatry when his mental status is back to baseline.

## 2019-06-20 PROBLEM — F17.200 CURRENT SMOKER: Status: ACTIVE | Noted: 2019-01-01

## 2019-06-20 PROBLEM — E87.3 METABOLIC ALKALOSIS: Status: ACTIVE | Noted: 2019-01-01

## 2019-06-20 PROBLEM — G93.6 VASOGENIC CEREBRAL EDEMA: Status: ACTIVE | Noted: 2019-01-01

## 2019-06-20 PROBLEM — G93.5 BRAIN COMPRESSION: Status: ACTIVE | Noted: 2019-01-01

## 2019-06-20 PROBLEM — F19.10 DRUG ABUSE, IV: Status: ACTIVE | Noted: 2018-07-18

## 2019-06-20 PROBLEM — E87.6 HYPOKALEMIA: Status: ACTIVE | Noted: 2019-01-01

## 2019-06-20 PROBLEM — A41.9 SEPSIS: Status: ACTIVE | Noted: 2019-01-01

## 2019-06-20 PROBLEM — G81.94 LEFT HEMIPARESIS: Status: ACTIVE | Noted: 2019-01-01

## 2019-06-20 PROBLEM — G93.6 CYTOTOXIC CEREBRAL EDEMA: Status: ACTIVE | Noted: 2019-01-01

## 2019-06-20 NOTE — ASSESSMENT & PLAN NOTE
WBC elevated  Hx IVDU  -pan cx  -broad spec ABX  -trending down from OSH  Has been on and off various abxs for duration of hospitalization extending to 4 days  Cultures remain negative, d/c abx

## 2019-06-20 NOTE — SUBJECTIVE & OBJECTIVE
Neurologic Chief Complaint: Right ICH    Subjective:     Interval History: Patient is seen for follow-up neurological assessment and treatment recommendations: No acute issues or events overnight.  Restless but cooperative.  On HTS 2%.    HPI, Past Medical, Family, and Social History remains the same as documented in the initial encounter.     Review of Systems   Constitutional: Negative for fever.   Eyes: Positive for visual disturbance.   Gastrointestinal: Negative for vomiting.   Neurological: Positive for speech difficulty and weakness.     Scheduled Meds:   folic acid  1 mg Oral Daily    levetiracetam IVPB  500 mg Intravenous Q12H    lidocaine-EPINEPHrine 1%-1:100,000  10 mL Intradermal Once    niCARdipine        senna-docusate 8.6-50 mg  1 tablet Oral BID    thiamine  100 mg Oral Daily     Continuous Infusions:   Sodium Chloride 2% 80 mL/hr at 06/20/19 0915     PRN Meds:calcium gluconate IVPB, calcium gluconate IVPB, calcium gluconate IVPB, hydrALAZINE, magnesium sulfate IVPB, magnesium sulfate IVPB, midazolam, ondansetron, potassium chloride in water **AND** potassium chloride in water **AND** potassium chloride in water, buffered 2% sodium acetate 86meq, sodium chloride 86meq, sterile water for inj IV soln, sodium chloride 0.9%, sodium phosphate IVPB, sodium phosphate IVPB, sodium phosphate IVPB    Objective:     Vital Signs (Most Recent):  Temp: 99.7 °F (37.6 °C) (06/20/19 1000)  Pulse: 79 (06/20/19 1000)  Resp: (!) 36 (06/20/19 1000)  BP: 132/76 (06/20/19 1000)  SpO2: 98 % (06/20/19 1000)  BP Location: Left arm    Vital Signs Range (Last 24H):  Temp:  [98.1 °F (36.7 °C)-100 °F (37.8 °C)]   Pulse:  []   Resp:  [15-36]   BP: (118-164)/(60-85)   SpO2:  [96 %-100 %]   Arterial Line BP: (128-150)/(62-69)   BP Location: Left arm    Physical Exam   Constitutional:   Underweight; multiple small wounds over body   Cardiovascular: Normal rate.   Pulmonary/Chest:   Tachypnic   Skin: Skin is warm. Rash  noted.       Neurological Exam:   LOC: alert  Attention Span: poor  Language: No aphasia  Articulation: Dysarthria  Orientation: Not oriented to place, Not oriented to time  Visual Fields: Inconsistent blink to threat on the left  EOM (CN III, IV, VI): Gaze preference  right  Pupils (CN II, III): PERRL  Facial Sensation (CN V): Normal  Facial Movement (CN VII): Lower facial weakness on the Left  Motor: Spontaneous LUE, RUE, RLE; withdraws LLE  Cebellar: No evidence of appendicular or axial ataxia  Sensation: Intact to light touch, temperature   Tone: Increased tone LUE    Laboratory:  CMP:   Recent Labs   Lab 06/20/19 0457 06/20/19  1103 06/20/19  1258   CALCIUM 8.8  8.8  --   --   --    ALBUMIN 3.6  3.6  --   --   --    PROT 6.7  6.7  --   --   --      138   < > 141  --    K 3.0*  3.0*  --   --  3.2*   CO2 21*  21*  --   --   --      103  --   --   --    BUN 11  11  --   --   --    CREATININE 0.7  0.7  --   --   --    ALKPHOS 62  62  --   --   --    ALT 89*  89*  --   --   --    AST 92*  92*  --   --   --    BILITOT 1.3*  1.3*  --   --   --     < > = values in this interval not displayed.     BMP:   Recent Labs   Lab 06/20/19 0457 06/20/19  1103 06/20/19  1258     138   < > 141  --    K 3.0*  3.0*  --   --  3.2*     103  --   --   --    CO2 21*  21*  --   --   --    BUN 11  11  --   --   --    CREATININE 0.7  0.7  --   --   --    CALCIUM 8.8  8.8  --   --   --     < > = values in this interval not displayed.     CBC:   Recent Labs   Lab 06/20/19 0457   WBC 14.42*   RBC 4.63   HGB 14.3   HCT 41.9      MCV 91   MCH 30.9   MCHC 34.1     Lipid Panel:   Recent Labs   Lab 06/19/19  1807   CHOL 144   LDLCALC 80.4   HDL 31*   TRIG 163*     Coagulation:   Recent Labs   Lab 06/20/19  0457   INR 1.1   APTT 28.4     Hgb A1C:   Recent Labs   Lab 06/19/19  1807   HGBA1C 5.2     TSH:   Recent Labs   Lab 06/19/19  1807   TSH 1.015       Diagnostic Results     Brain  imaging:  MRI 6/20/2019  Large parenchymal hemorrhage centered within the bilateral parietal lobes extending across midline through the splenium of the corpus callosum corresponding to abnormality seen on CT.  There is thin subdural hemorrhage overlying the right cerebral hemisphere with questionable trace hemorrhage also overlying the left inferior frontal lobe.  Mass effect with approximate 7 mm of leftward midline shift with distortion of the ventricles similar to prior.  Trace prominence of the temporal horns lateral ventricles cannot exclude component of trapped ventricles and early hydrocephalus with remaining ventricular system relatively the normal in caliber.    CT head w/o contrast 6-19-19 results:  1. The study is significantly abnormal.  There is acute intracranial hemorrhage with large parenchymal hemorrhage in the posteromedial right occipital parietal lobe crossing over the splenium of the corpus callosum into the posteromedial left occipital parietal lobe.  There is moderate-to-marked associated edema.  There is approximately 6 mm right to left midline shift.  Subdural blood is also seen along the falx and extending inferiorly along the right tentorium.  The cerebellar tonsils are normal position.  There is compression and depression of the lateral ventricles with moderate to marked dilatation of the temporal horns.     Vessel Imaging:  MRA/MRV 6/20/2019  MRA head: Unremarkable motion limited MRA of the head specifically without definite abnormal flow related enhancement associated with the region of parenchymal hemorrhage partially included in the study to suggest definite associated vascular malformation.  Correlation and follow up repeat imaging when patient better able to tolerate scanning advised.  Thin subdural hemorrhage overlies the right cerebral hemisphere.  MRV: Motion limited examination as detailed above with dominant right transverse and sigmoid sinuses.  The left sigmoid sinus is not  seen and likely obscured by motion.    CTA Head and neck 6-19-19 results:    Acute intraparenchymal hematoma centered within right and left parietal lobes with extension across the corpus callosum.  Volume of hemorrhage is stable to slightly progressed in comparison to prior exam.  Slightly progressed edema and mass effect resulting in 8 cm leftward midline shift.  No new hemorrhage.    Stable dilatation of the temporal horns of lateral ventricles consistent mild hydrocephalus.    Small volume subdural hemorrhage layering along the falx and tentorium, unchanged    No evidence of large intracranial aneurysm or AVM.  Please note evaluation is limited secondary to extensive hemorrhage and mass effect.  Consider cerebral angiography for further evaluation, as clinically indicated.     Cardiac Evaluation:   EKG 6-19-19 results:  Normal sinus rhythm  Biatrial enlargement  Prolonged QT  Abnormal ECG  When compared with ECG of 16-JUN-2019 12:31,  Vent. rate has increased BY 34 BPM  Non-specific change in ST segment in Inferior leads  QT has lengthened

## 2019-06-20 NOTE — PLAN OF CARE
Problem: Physical Therapy Goal  Goal: Physical Therapy Goal  Goals to be met by: 2019     Patient will increase functional independence with mobility by performin. Supine to sit with Moderate Assistance  2. Sit to supine with Moderate Assistance  3. Sit to stand transfer with maximum assistance   4. Gait  x 5 feet with Maximum Assistance using least restrictive device   5. Sitting at edge of bed x10 minutes with Minimal Assistance  6. Lower extremity exercise program x20 reps per handout, with assistance as needed to improve strength and ROM and increase activity tolerance.     Outcome: Ongoing (interventions implemented as appropriate)  Evaluation completed, initiated plan of care.   Aura Cameron, PT  2019

## 2019-06-20 NOTE — PLAN OF CARE
Pt to IR from Neuro-ICU. Pt. ID'd per armband. Awake, , presents with some confusion. Labs reviewed. Phone consent obtained from sister. Pt. Moved to procedure table using slideboard w/ full assist. Continued monitoring. Positioned comfortably.

## 2019-06-20 NOTE — HOSPITAL COURSE
06/20/2019: PT- Bed mobility Max-TA x 2 ppl. Passed bedside swallow evaluation.  SLP recommending dental soft diet and thin liquids.SLP diagnosis of Cognitive-Linguistic Impairment and Visio-Spatial Impairment.  OT evaluation pending.   6/21/19:  Bed mobility Max-TA x 2 ppl.  Sat EOB 5 mins MaxA. No gait.  ADLs TA.

## 2019-06-20 NOTE — CONSULTS
[facility]  Physical Medicine & Rehab  Consult Note    Patient Name: Shahid Lackey  MRN: [id number]  Admission Date: 6/19/2019  Hospital Length of Stay: 1 days  Attending Physician: Filipe Madrid MD     Inpatient consult to Physical Medicine & Rehabilitation  Consult performed by: Felicia Marrero NP  Consult requested by:  Filipe Madrid MD    Reason for Consult:  assess rehabilitation needs  Consults  Subjective:     Principal Problem: Nontraumatic cortical hemorrhage of cerebral hemisphere    HPI: Shahid Lackey is a 33-year-old male with PMHx of drug abuse.  Patient presented to [place] with a drug overdose.  He was combative, PEC'd, and transferred to [facility].  At [facility], he was unable to move his L side. CTH revealed large ICH with IVH R parietal lobe with midline shift. Hospital course at OSH complicated by delirium (treated with benzodiazepines, morphine, Haldol and Precedex drip), rhabdomyolysis, & leuckocytosis .Transferred to [facility] on 6/19 for further evaluation and management.  Upon admission, drug screen  Positive for cocaine, heroin, THC, amphetamines and benzo's. CAT revealed acute intraparenchymal hematoma centered within right and left parietal lobes with extension across the corpus callosum.  Volume of hemorrhage is stable to slightly progressed in comparison to prior exam per radiology. On 3% and Cardene gtt.      Functional History: Per sister, patient lives in [place] with aunt in a single story home with 1 step to enter.  Prior to admission, (I) with ADLs and mobility.  DME: none.    Hospital Course: 06/20/2019: Therapy evaluations pending.      Past Medical History:   Diagnosis Date    Current smoker     Drug abuse, IV      Past Surgical History:   Procedure Laterality Date    INSERT ARTERIAL LINE  6/20/2019          Review of patient's allergies indicates:  No Known Allergies    Scheduled Medications:    folic acid  1 mg Oral Daily     levetiracetam IVPB  500 mg Intravenous Q12H    lidocaine-EPINEPHrine 1%-1:100,000  10 mL Intradermal Once    senna-docusate 8.6-50 mg  1 tablet Oral BID    thiamine  100 mg Oral Daily       PRN Medications: calcium gluconate IVPB, calcium gluconate IVPB, calcium gluconate IVPB, hydrALAZINE, magnesium sulfate IVPB, magnesium sulfate IVPB, ondansetron, potassium chloride in water **AND** potassium chloride in water **AND** potassium chloride in water, buffered 2% sodium acetate 86meq, sodium chloride 86meq, sterile water for inj IV soln, sodium chloride 0.9%, sodium phosphate IVPB, sodium phosphate IVPB, sodium phosphate IVPB    Family History     Problem Relation (Age of Onset)    No Known Problems Mother, Father        Tobacco Use    Smoking status: Current Every Day Smoker     Packs/day: 1.00    Smokeless tobacco: Never Used   Substance and Sexual Activity    Alcohol use: No    Drug use: Yes     Types: IV    Sexual activity: Not Currently     Review of Systems   Reason unable to perform ROS: AMS.     Objective:     Vital Signs (Most Recent):  Temp: 99.4 °F (37.4 °C) (06/20/19 0405)  Pulse: 91 (06/20/19 0715)  Resp: (!) 31 (06/20/19 0715)  BP: (!) 141/74 (06/20/19 0715)  SpO2: 97 % (06/20/19 0715)    Vital Signs (24h Range):  Temp:  [98.1 °F (36.7 °C)-100.5 °F (38.1 °C)] 99.4 °F (37.4 °C)  Pulse:  [] 91  Resp:  [15-35] 31  SpO2:  [96 %-100 %] 97 %  BP: (109-172)/(60-86) 141/74  Arterial Line BP: (128-150)/(62-69) 128/62     Body mass index is 18.83 kg/m².    Physical Exam   Constitutional: He appears well-developed and well-nourished.   mittent to R hand    HENT:   Head: Normocephalic and atraumatic.   Eyes: Right eye exhibits no discharge. Left eye exhibits no discharge.   Neck: Neck supple.   Cardiovascular: Normal rate and intact distal pulses.   Pulmonary/Chest: Effort normal. No respiratory distress.   Abdominal: Soft. He exhibits no distension.   Musculoskeletal: He exhibits no edema or  deformity.   L sided weakness    Neurological: He is alert. He is disoriented.   Facial asymmetry    Skin: Skin is warm and dry.   Abrasions    Psychiatric: His affect is inappropriate. He is agitated. Cognition and memory are impaired.        Diagnostic Results:   Labs: Reviewed  ECG: Reviewed  X-Ray: Reviewed  CT: Reviewed  MRI: Reviewed    Assessment/Plan:     * Nontraumatic cortical hemorrhage of cerebral hemisphere  -h/o of IV drug abuse   -large R ICH with IVH on imaging    See hospital course for functional, cognitive/speech/language, and nutrition/swallow status.      Recommendations  -  Encourage mobility, OOB in chair at least 3 hours per day, and early ambulation as appropriate   -  PT/OT evaluate and treat  -  SLP speech and cognitive evaluate and treat  -  Monitor sleep disturbances and establish consistent sleep-wake cycle  -  Monitor for bowel and bladder dysfunction  -  Monitor for shoulder pain, subluxation, & spasticity  -  Monitor for and prevent skin breakdown and pressure ulcers  · Early mobility, repositioning/weight shifting every 20-30 minutes when sitting, turn patient every 2 hours, proper mattress/overlay and chair cushioning, pressure relief/heel protector boots  -  Reviewed discharge options (IP rehab, SNF, HH therapy, and OP therapy)    Left hemiparesis  -PT/OT evaluate and treat    Therapy evaluations pending. Will follow progress and discuss with rehab team for post acute care/rehab recommendation.      Thank you for your consult.     Felicia Marrero NP  Department of Physical Medicine & Rehab  [facility]

## 2019-06-20 NOTE — PROGRESS NOTES
[facility]  Neurosurgery  Progress Note    Subjective:     History of Present Illness: 33 M presents for eval of large right IPH with IVH and midline shift.  He was at [facility] and was admitted for polysubstance abuse and was +for cocaine, heroin, THC, amphetamines.  Today he stopped moving his LUE and CT head showed large hemorrhage.  NSGY consulted for eval.    Post-Op Info:  * No surgery found *         Interval History: 6/20: TARYN ON. Patient is on cardene and 2% NS, is becoming somewhat more responsive than last night. CTA was negative for vascular malformation. Speech remains confused.       Medications:  Continuous Infusions:   Sodium Chloride 2% 80 mL/hr at 06/20/19 0915    sodium chloride 3%       Scheduled Meds:   folic acid  1 mg Oral Daily    levetiracetam IVPB  500 mg Intravenous Q12H    lidocaine-EPINEPHrine 1%-1:100,000  10 mL Intradermal Once    senna-docusate 8.6-50 mg  1 tablet Oral BID    thiamine  100 mg Oral Daily     PRN Meds:calcium gluconate IVPB, calcium gluconate IVPB, calcium gluconate IVPB, hydrALAZINE, magnesium sulfate IVPB, magnesium sulfate IVPB, ondansetron, potassium chloride in water **AND** potassium chloride in water **AND** potassium chloride in water, buffered 2% sodium acetate 86meq, sodium chloride 86meq, sterile water for inj IV soln, sodium chloride 0.9%, sodium phosphate IVPB, sodium phosphate IVPB, sodium phosphate IVPB     Review of Systems  Objective:     Weight: 59.4 kg (131 lb)  Body mass index is 18.8 kg/m².  Vital Signs (Most Recent):  Temp: 99.7 °F (37.6 °C) (06/20/19 1000)  Pulse: 79 (06/20/19 1000)  Resp: (!) 36 (06/20/19 1000)  BP: 132/76 (06/20/19 1000)  SpO2: 98 % (06/20/19 1000) Vital Signs (24h Range):  Temp:  [98.1 °F (36.7 °C)-100.5 °F (38.1 °C)] 99.7 °F (37.6 °C)  Pulse:  [] 79  Resp:  [15-36] 36  SpO2:  [96 %-100 %] 98 %  BP: (118-164)/(60-85) 132/76  Arterial Line BP: (128-150)/(62-69) 141/65     Date 06/20/19  "0700 - 06/21/19 0659   Shift 1179-2946 9613-4133 9550-6960 24 Hour Total   INTAKE   I.V.(mL/kg) 604.9(10.2)   604.9(10.2)   IV Piggyback 100   100   Shift Total(mL/kg) 704.9(11.9)   704.9(11.9)   OUTPUT   Urine(mL/kg/hr) 550   550   Shift Total(mL/kg) 550(9.3)   550(9.3)   Weight (kg) 59.4 59.4 59.4 59.4                        Urethral Catheter 06/15/19 2005 Latex 16 Fr. (Active)   Site Assessment Clean;Intact 6/20/2019  4:05 AM   Collection Container Urimeter 6/20/2019  4:05 AM   Securement Method secured to top of thigh w/ adhesive device 6/20/2019  4:05 AM   Catheter Care Performed yes 6/20/2019  4:05 AM   Reason for Continuing Urinary Catheterization Critically ill in ICU requiring intensive monitoring 6/20/2019  4:05 AM   CAUTI Prevention Bundle StatLock in place w 1" slack;Intact seal between catheter & drainage tubing;Drainage bag off the floor;Green sheeting clip in use;No dependent loops or kinks;Drainage bag not overfilled (<2/3 full);Drainage bag below bladder 6/20/2019  4:05 AM   Output (mL) 200 mL 6/20/2019 10:00 AM            Urethral Catheter (Active)       Neurosurgery Physical Exam  General: laying in bed, asleep, comfortable.  Head: normocephalic, atraumatic  Neurologic:   GCS: Eyes 2; easily arousable. Verbal 4, Motor 6  Mental Status: awake, however not opening eyes unless stimulated, but able to participate in converation  Oriented to self but not place (knows is hospital) and not year.   Cranial nerves: PERRL, R sided gaze preference.   Localizes briskly in the Lower extremities.   RUE following commands, no movement of the left upper extremity.   DTR: 2+ symmetrically throughout.  Pulmonary: normal respirations, no signs of respiratory distress  Abdomen: soft, non-distended, not tender to palpation                     Significant Labs:  Recent Labs   Lab 06/19/19  0540 06/19/19  1807  06/20/19  0457 06/20/19  0618 06/20/19  1103 06/20/19  1258   GLU 97 97  --  102  102  --   --   --    JOSE 138 " 139   < > 138  138 142 141  --    K 3.5 3.7  --  3.0*  3.0*  --   --  3.2*    103  --  103  103  --   --   --    CO2 21* 20*  --  21*  21*  --   --   --    BUN 15 13  --  11  11  --   --   --    CREATININE 0.8 0.7  --  0.7  0.7  --   --   --    CALCIUM 8.8 8.8  --  8.8  8.8  --   --   --    MG  --   --   --  2.2  --   --   --     < > = values in this interval not displayed.     Recent Labs   Lab 06/19/19  0540 06/19/19  1807 06/20/19  0457   WBC 13.70* 15.63* 14.42*   HGB 13.9* 15.1 14.3   HCT 41.2 43.6 41.9    314 328     Recent Labs   Lab 06/19/19  1445 06/19/19 1807 06/20/19  0457   INR 1.1 1.1 1.1   APTT 34.2* 29.6 28.4     Microbiology Results (last 7 days)     Procedure Component Value Units Date/Time    Blood culture [[id number]]     Order Status:  Canceled Specimen:  Blood     Blood culture [[id number]]     Order Status:  Canceled Specimen:  Blood     Culture, Respiratory with Gram Stain [[id number]]     Order Status:  No result Specimen:  Respiratory         CMP:   Recent Labs   Lab 06/19/19  0540 06/19/19  1445 06/19/19  1807  06/20/19  0457 06/20/19  0618 06/20/19  1103 06/20/19  1258   GLU 97  --  97  --  102  102  --   --   --    CALCIUM 8.8  --  8.8  --  8.8  8.8  --   --   --    ALBUMIN  --  3.7 3.8  --  3.6  3.6  --   --   --    PROT  --  6.6 6.8  --  6.7  6.7  --   --   --      --  139   < > 138  138 142 141  --    K 3.5  --  3.7  --  3.0*  3.0*  --   --  3.2*   CO2 21*  --  20*  --  21*  21*  --   --   --      --  103  --  103  103  --   --   --    BUN 15  --  13  --  11  11  --   --   --    CREATININE 0.8  --  0.7  --  0.7  0.7  --   --   --    ALKPHOS  --  61 62  --  62  62  --   --   --    ALT  --  111* 105*  --  89*  89*  --   --   --    AST  --  139* 124*  --  92*  92*  --   --   --    BILITOT  --  1.0 1.1*  --  1.3*  1.3*  --   --   --     < > = values in this interval not displayed.     CRP: No results for input(s): CRP in the last 48 hours.  ESR:  No results for input(s): POCESR, ERYTHROCYTES in the last 48 hours.  LFTs:   Recent Labs   Lab 06/19/19  1445 06/19/19  1807 06/20/19  0457   * 105* 89*  89*   * 124* 92*  92*   ALKPHOS 61 62 62  62   BILITOT 1.0 1.1* 1.3*  1.3*   PROT 6.6 6.8 6.7  6.7   ALBUMIN 3.7 3.8 3.6  3.6       Significant Diagnostics:  CT: No results found in the last 24 hours.  Echoencephalography: No results found in the last 24 hours.  MRI: No results found in the last 24 hours.    Assessment/Plan:     * Nontraumatic cortical hemorrhage of cerebral hemisphere  33 M with polysubstance abuse presents with large right parietooccipital ICH and IVH.  ICH score of 3. Exam improving, RUE paresis.     -Admitted to [facility] for q 1 hr neuro checks  -SBP less than 140  -Na goal 145-155; currently 139 on 2% Na  -CTA without vascular malformation  -MRI/A/V to assess for venous thrombosis   -We will continue to monitor closely, call for any change in exam          Noel Castro MD  Neurosurgery  [facility]

## 2019-06-20 NOTE — H&P
Inpatient Radiology Pre-procedure Note    History of Present Illness:  Shahid Lackey is a 33 y.o. male who presents for cerebral angiogram and possible intervention. Pt was transferred from [facility] after CT demonstrated ICH. He was then transferred to [facility] because he was PEC/CEC. Sometime this morning, a nurse noticed that he was not using his left side and a head CT was ordered.     We were asked to perform an angiogram and possible intervention depending on findings.       Admission H&P reviewed.  Past Medical History:   Diagnosis Date    Current smoker     Drug abuse, IV      Past Surgical History:   Procedure Laterality Date    INSERT ARTERIAL LINE  6/20/2019            Review of Systems:   As documented in primary team H&P    Home Meds:   Prior to Admission medications    Medication Sig Start Date End Date Taking? Authorizing Provider   buprenorphine-naloxone (SUBOXONE) 8-2 mg Film Place 2 each under the tongue once daily.   Yes Historical Provider, MD   clonazePAM (KLONOPIN) 0.5 MG tablet Take 0.5 mg by mouth 2 (two) times daily as needed for Anxiety.   Yes Historical Provider, MD   FLUoxetine 40 MG capsule Take 40 mg by mouth once daily.   Yes Historical Provider, MD   gabapentin (NEURONTIN) 300 MG capsule Take 300 mg by mouth 3 (three) times daily.   Yes Historical Provider, MD     Scheduled Meds:    folic acid  1 mg Oral Daily    levetiracetam IVPB  500 mg Intravenous Q12H    lidocaine-EPINEPHrine 1%-1:100,000  10 mL Intradermal Once    niCARdipine        senna-docusate 8.6-50 mg  1 tablet Oral BID    thiamine  100 mg Oral Daily     Continuous Infusions:    Sodium Chloride 2% 80 mL/hr at 06/20/19 0915     PRN Meds:calcium gluconate IVPB, calcium gluconate IVPB, calcium gluconate IVPB, hydrALAZINE, magnesium sulfate IVPB, magnesium sulfate IVPB, midazolam, ondansetron, potassium chloride in water **AND** potassium chloride in water **AND** potassium chloride in water, buffered  2% sodium acetate 86meq, sodium chloride 86meq, sterile water for inj IV soln, sodium chloride 0.9%, sodium phosphate IVPB, sodium phosphate IVPB, sodium phosphate IVPB  Anticoagulants/Antiplatelets: no anticoagulation    Allergies: Review of patient's allergies indicates:  No Known Allergies  Sedation Hx: have not been any systemic reactions    Labs:  Recent Labs   Lab 06/20/19  0457   INR 1.1       Recent Labs   Lab 06/20/19  0457   WBC 14.42*   HGB 14.3   HCT 41.9   MCV 91         Recent Labs   Lab 06/19/19  1445  06/20/19  0457  06/20/19  1103 06/20/19  1258   GLU  --    < > 102  102  --   --   --    NA  --    < > 138  138   < > 141  --    K  --    < > 3.0*  3.0*  --   --  3.2*   CL  --    < > 103  103  --   --   --    CO2  --    < > 21*  21*  --   --   --    BUN  --    < > 11  11  --   --   --    CREATININE  --    < > 0.7  0.7  --   --   --    CALCIUM  --    < > 8.8  8.8  --   --   --    MG  --   --  2.2  --   --   --    *   < > 89*  89*  --   --   --    *   < > 92*  92*  --   --   --    ALBUMIN 3.7   < > 3.6  3.6  --   --   --    BILITOT 1.0   < > 1.3*  1.3*  --   --   --    BILIDIR 0.5*  --   --   --   --   --     < > = values in this interval not displayed.         Vitals:  Temp: 99.7 °F (37.6 °C) (06/20/19 1000)  Pulse: 79 (06/20/19 1000)  Resp: (!) 36 (06/20/19 1000)  BP: 132/76 (06/20/19 1000)  SpO2: 98 % (06/20/19 1000)     Physical Exam:  ASA: 3  Mallampati: 2    General: no acute distress  Mental Status: alert and oriented to person, place and time  HEENT: normocephalic, atraumatic  Chest: unlabored breathing  Abdomen: nondistended  Extremity: moves all extremities    Plan: Angiogram with possible intervention depending on findings.   Sedation Plan: moderate.     Randall Yoon MD  PGY - 3  Department of Radiology

## 2019-06-20 NOTE — ASSESSMENT & PLAN NOTE
Positive for THC, amphetamines, cocaine, benzo and opiates on admit  -TTE for eval of poss endocarditis  - extremity U/S to eval for DVT

## 2019-06-20 NOTE — CONSULTS
[facility]  Vascular Neurology  Comprehensive Stroke Center  Consult Note    Inpatient consult to Vascular (Stroke) Neurology  Consult performed by: Melissa Lind NP  Consult ordered by: Rekha Jeronimo PA-C  Reason for consult: ICH with IVH        Assessment/Plan:     Patient is a 33 y.o. year old male with:    * Nontraumatic cortical hemorrhage of cerebral hemisphere  34 y/o IV drug abuser with large R ICH with IVH    Antithrombotics: None ICH    Statins: None ICH    Aggressive risk factor modification: drug use, smoker     Rehab efforts: The patient has been evaluated by a stroke team provider and the therapy needs have been fully considered based off the presenting complaints and exam findings. The following therapy evaluations are needed: PT evaluate and treat, OT evaluate and treat, SLP evaluate and treat, PM&R evaluate for appropriate placement    Diagnostics ordered/pending: MRI head without contrast to assess brain parenchyma, TTE to assess cardiac function/status     VTE prophylaxis: SCD;s only large ICH    BP parameters: ICH: SBP <140        Vasogenic cerebral edema  As seen on cerebral imaging    Left hemiparesis  Due to ICH  Aggressive therapy when appropriate    Drug abuse, IV  Stroke risk factor  Counseling     Current smoker  Risk factor  Smoking cessation        STROKE DOCUMENTATION          NIH Scale:  1a. Level of Consciousness: 0-->Alert, keenly responsive  1b. LOC Questions: 1-->Answers one question correctly  1c. LOC Commands: 2-->Performs neither task correctly  2. Best Gaze: 0-->Normal  3. Visual: 0-->No visual loss  4. Facial Palsy: 0-->Normal symmetrical movements  5a. Motor Arm, Left: 4-->No movement  5b. Motor Arm, Right: 0-->No drift, limb holds 90 (or 45) degrees for full 10 secs  6a. Motor Leg, Left: 4-->No movement  6b. Motor Leg, Right: 0-->No drift, leg holds 30 degree position for full 5 secs  7. Limb Ataxia: 0-->Absent  8. Sensory: 0-->Normal, no  sensory loss  9. Best Language: 0-->No aphasia, normal  10. Dysarthria: 0-->Normal  11. Extinction and Inattention (formerly Neglect): 0-->No abnormality  Total (NIH Stroke Scale): 11    Modified Felton Score: 0  Catherine Coma Scale:10   ABCD2 Score:    [id number] Score:   HAS -BLED Score:   ICH Score:3  Hunt & Dougherty Classification:       Thrombolysis Candidate? No, CT findings (ICH, SAH, etc)     Delays to Thrombolysis?  No    Interventional Revascularization Candidate?   Is the patient eligible for mechanical endovascular reperfusion (CHARLES)?  No; ICH/ SAH       Hemorrhagic change of an Ischemic Stroke: Does this patient have an ischemic stroke with hemorrhagic changes? No  ICH    Subjective:     History of Present Illness:  34 y/o male with long history of IV drug abuse was brought in to [facility] on 6-15-19 with drug overdose and was extremely combative and was PEC/CEC and transferred to [facility] to the ICU for closer monitoring. On 6-19-19 noticed no movement on left side but unsure when last known time really is. CT scan revealed large ICH with IVH R parietal lobe with midline shift. Patient transferred to [facility] for closer monitoring and evalaution.  Patiet drug screen +for cocaine, heroin, THC, amphetamines and benzo's.         Past Medical History:   Diagnosis Date    Current smoker     Drug abuse, IV      History reviewed. No pertinent surgical history.  Family History   Problem Relation Age of Onset    No Known Problems Mother     No Known Problems Father      Social History     Tobacco Use    Smoking status: Current Every Day Smoker     Packs/day: 1.00    Smokeless tobacco: Never Used   Substance Use Topics    Alcohol use: No    Drug use: Yes     Types: IV     Review of patient's allergies indicates:  No Known Allergies    Medications: I have reviewed the current medication administration record.      (Not in a hospital admission)    Review of Systems   Constitutional:  Negative for chills and fever.   HENT: Negative for ear discharge and ear pain.    Eyes: Negative for pain and redness.   Respiratory: Negative for cough and shortness of breath.    Cardiovascular: Negative for chest pain and leg swelling.   Gastrointestinal: Negative for abdominal distention and abdominal pain.   Genitourinary: Negative for dysuria and enuresis.   Musculoskeletal: Negative for arthralgias and back pain.   Skin: Positive for wound.   Neurological: Positive for weakness.   Psychiatric/Behavioral: Positive for agitation and confusion.     Objective:     Vital Signs (Most Recent):  Temp: 99.9 °F (37.7 °C) (06/19/19 2122)  Pulse: 98 (06/19/19 2331)  Resp: (!) 33 (06/19/19 2331)  BP: 130/62 (06/19/19 2331)  SpO2: 98 % (06/19/19 2331)    Vital Signs Range (Last 24H):  Temp:  [98.1 °F (36.7 °C)-100.5 °F (38.1 °C)]   Pulse:  []   Resp:  [15-35]   BP: (109-172)/()   SpO2:  [96 %-100 %]     Physical Exam   Constitutional: He appears well-developed and well-nourished.   HENT:   Head: Normocephalic.   Eyes: Pupils are equal, round, and reactive to light.   Neck: Normal range of motion.   Cardiovascular: Normal rate.   Pulmonary/Chest: Effort normal.   Abdominal: Soft.   Neurological:   Lethargic, left sided weaness   Nursing note and vitals reviewed.      Neurological Exam:   LOC: drowsy  Attention Span: poor  Language: No aphasia, delirium from drugs  Articulation: No dysarthria  Orientation: Not oriented to place, Not oriented to time  Visual Fields: Full  EOM (CN III, IV, VI): Full/intact  Pupils (CN II, III): PERRL  Facial Sensation (CN V): Normal  Facial Movement (CN VII): Symmetric facial expression    Gag Reflex: present  Reflexes: flexor plantar responses bilaterally  Motor: Arm left  Plegia 0/5  Leg left  Plegia 0/5  Arm right  Normal 5/5  Leg right Normal 5/5  Cebellar: No evidence of appendicular or axial ataxia  Sensation: Intact to light touch, temperature and vibration  Tone: Flaccid   LUE  and LLE      Laboratory:  CMP:   Recent Labs   Lab 06/19/19  1807   CALCIUM 8.8   ALBUMIN 3.8   PROT 6.8      K 3.7   CO2 20*      BUN 13   CREATININE 0.7   ALKPHOS 62   *   *   BILITOT 1.1*     CBC:   Recent Labs   Lab 06/19/19 1807   WBC 15.63*   RBC 4.89   HGB 15.1   HCT 43.6      MCV 89   MCH 30.9   MCHC 34.6     Lipid Panel:   Recent Labs   Lab 06/19/19  1807   CHOL 144   LDLCALC 80.4   HDL 31*   TRIG 163*     Coagulation:   Recent Labs   Lab 06/19/19 1807   INR 1.1   APTT 29.6     Hgb A1C:   Recent Labs   Lab 06/19/19 1807   HGBA1C 5.2     TSH:   Recent Labs   Lab 06/19/19 1807   TSH 1.015       Diagnostic Results:      Brain imaging:  CT head w/o contrast 6-19-19 results:    1. The study is significantly abnormal.  There is acute intracranial hemorrhage with large parenchymal hemorrhage in the posteromedial right occipital parietal lobe crossing over the splenium of the corpus callosum into the posteromedial left occipital parietal lobe.  There is moderate-to-marked associated edema.  There is approximately 6 mm right to left midline shift.  Subdural blood is also seen along the falx and extending inferiorly along the right tentorium.  The cerebellar tonsils are normal position.  There is compression and depression of the lateral ventricles with moderate to marked dilatation of the temporal horns.    Vessel Imaging:  CTA Head and neck 6-19-19 results:    Acute intraparenchymal hematoma centered within right and left parietal lobes with extension across the corpus callosum.  Volume of hemorrhage is stable to slightly progressed in comparison to prior exam.  Slightly progressed edema and mass effect resulting in 8 cm leftward midline shift.  No new hemorrhage.    Stable dilatation of the temporal horns of lateral ventricles consistent mild hydrocephalus.    Small volume subdural hemorrhage layering along the falx and tentorium, unchanged    No evidence of large  intracranial aneurysm or AVM.  Please note evaluation is limited secondary to extensive hemorrhage and mass effect.  Consider cerebral angiography for further evaluation, as clinically indicated.    Cardiac Evaluation:   EKG 6-19-19 results:  Normal sinus rhythm  Biatrial enlargement  Prolonged QT  Abnormal ECG  When compared with ECG of 16-JUN-2019 12:31,  Vent. rate has increased BY 34 BPM  Non-specific change in ST segment in Inferior leads  QT has lengthened      Melissa Lind NP  Comprehensive Stroke Center  Department of Vascular Neurology   [facility]

## 2019-06-20 NOTE — CONSULTS
"  [facility]  Adult Nutrition  Consult Note    SUMMARY     Recommendations    Recommendation/Intervention:   Continue dental soft diet. If po intake < 50% of meals, add Boost TID to increase caloric intake.     RD to monitor.    Goals: Pt to tolerate >85% EEN and EPN by RD follow up  Nutrition Goal Status: new  Communication of RD Recs: reviewed with RN    Reason for Assessment    Reason For Assessment: consult  Diagnosis: hemorrhage  Relevant Medical History: IVDU  Interdisciplinary Rounds: attended  General Information Comments: Pt's diet advanced per SLP recommendations however meals held at this time as pt pending MRI. Pt confused, not oriented. Unable to obtain nutrition hx at this time, no family at bedside. Noted pt +10lb in 2018. Pt appears nourished, noted IVDU. No indications for malnutrition at this time. Pt yelling for food earlier, requesting pizza.  Nutrition Discharge Planning: adequate po intake for optimal nutrition    Nutrition Risk Screen    Nutrition Risk Screen: no indicators present    Nutrition/Diet History    Spiritual, Cultural Beliefs, Adventist Practices, Values that Affect Care: no  Factors Affecting Nutritional Intake: None identified at this time    Anthropometrics    Temp: 99.7 °F (37.6 °C)  Height: 5' 10" (177.8 cm)  Height (inches): 70 in  Weight Method: Bed Scale  Weight: 59.4 kg (131 lb)  Weight (lb): 131 lb  Ideal Body Weight (IBW), Male: 166 lb  % Ideal Body Weight, Male (lb): 78.92 lb  BMI (Calculated): 18.8  BMI Grade: 18.5-24.9 - normal       Lab/Procedures/Meds    Pertinent Labs Reviewed: reviewed  Pertinent Medications Reviewed: reviewed  Pertinent Medications Comments: cardene, DANA, versed    Estimated/Assessed Needs    Weight Used For Calorie Calculations: 59.4 kg (130 lb 15.3 oz)  Energy Calorie Requirements (kcal): 1931  Energy Need Method: [place]-St Jeor(PAL 1.25)  Protein Requirements: 59-71g(1.0-1.2g/kg)  Weight Used For Protein Calculations: 59.4 " kg (130 lb 15.3 oz)  Fluid Requirements (mL): 1mL/kcal or per MD     RDA Method (mL): 1931         Nutrition Prescription Ordered    Current Diet Order: dental soft    Evaluation of Received Nutrient/Fluid Intake    IV Fluid (mL): 960  I/O: -I/O, good UOP, LBM 6/15  % Intake of Estimated Energy Needs: 0 - 25 %  % Meal Intake: NPO    Nutrition Risk    Level of Risk/Frequency of Follow-up: (f/u 2x/week)     Assessment and Plan    Nutrition Problem  Inadequate energy intake    Related to (etiology):   NPO    Signs and Symptoms (as evidenced by):   Pt receiving <85% EEN and EPN.     Intervention:  Collaboration of nutrition care with providers    Nutrition Diagnosis Status:   New         Monitor and Evaluation    Food and Nutrient Intake: energy intake, food and beverage intake  Food and Nutrient Adminstration: diet order  Anthropometric Measurements: weight, weight change, body mass index  Biochemical Data, Medical Tests and Procedures: electrolyte and renal panel, gastrointestinal profile, glucose/endocrine profile, inflammatory profile, lipid profile  Nutrition-Focused Physical Findings: overall appearance     Nutrition Follow-Up    RD Follow-up?: Yes

## 2019-06-20 NOTE — PROCEDURES
Radiology Post-Procedure Note    Pre Op Diagnosis: ICH    Post Op Diagnosis: same    Procedure: Cerebral angiogram    Procedure performed by: Artem Pollard MD    Written Informed Consent Obtained: Yes    Specimen Removed: NO    Estimated Blood Loss: less than 50     Procedure report:     A 5F sheath was placed into the right femoral artery and a 5F Dario catheter was advanced into the aortic arch.  The bilateral CCA, ICA, ECA and vertebral arteries were subselected and angiography of the brain was performed after injection into each of these vessels.    Preliminary interpretation: No evidence of aneurysm or AVM. Diffuse irregularity of multiple small and medium sized arteries with multiple areas of narrowing/beading raising question of CNS vasculitis or RCVS,. Please see Imaging report for full details.    A right femoral artery angiogram was performed, the sheath removed and hemostasis achieved using an exoseal device.  No hematoma was present at the time of hemostasis.    The patient tolerated the procedure well.     Artem Pollard MD  Department of Radiology  Pager: [phone]

## 2019-06-20 NOTE — ASSESSMENT & PLAN NOTE
34 y/o IV drug abuser with large R ICH with IVH.  Etiology pending workup. MRV negative.  Plan for angio today.    Antithrombotics: None ICH    Statins: None ICH    Aggressive risk factor modification: drug use, smoker     Rehab efforts: The patient has been evaluated by a stroke team provider and the therapy needs have been fully considered based off the presenting complaints and exam findings. The following therapy evaluations are needed: PT evaluate and treat, OT evaluate and treat, SLP evaluate and treat, PM&R evaluate for appropriate placement    Diagnostics ordered/pending: Angiogram    VTE prophylaxis: SCD;s only large ICH    BP parameters: ICH: SBP <140

## 2019-06-20 NOTE — PLAN OF CARE
06/20/19 1359   Post-Acute Status   Post-Acute Authorization Placement   Post-Acute Placement Status Awaiting Internal Medical Clearance   Discharge Delays None known at this time       Adriana Wesley RN, CCRN-K, [facility]  Neuro-Critical Care   [phone]

## 2019-06-20 NOTE — ASSESSMENT & PLAN NOTE
Large right IPH with IVH and midline shift on CTH  -admit to [facility]  -CTA ordered  -NSGY consulted   -SBP goal <140  -cardene gtt  -2% hypertonic saline   -q 1 hour neuro checks  -q 1 hour vital signs  -daily cbc, cmp, mag, phos  -tte to eval for endocarditis  -broad spec ABX and cultures   -PT/OT/SLP  06/20: concern for underlying vascular lesion, 4v angio ordered, sbp parameter less then 140mmHg

## 2019-06-20 NOTE — ASSESSMENT & PLAN NOTE
CPK still elevated   -continue IVF replacements  -trend CPK  Improving, creatinine stable, ivfs while po intake low

## 2019-06-20 NOTE — PLAN OF CARE
[facility] [place]       06/20/19 0820   Final Note   Assessment Type Final Discharge Note   Anticipated Discharge Disposition Short Term

## 2019-06-20 NOTE — ASSESSMENT & PLAN NOTE
-h/o of IV drug abuse   -large R ICH with IVH on imaging    See hospital course for functional, cognitive/speech/language, and nutrition/swallow status.      Recommendations  -  Encourage mobility, OOB in chair at least 3 hours per day, and early ambulation as appropriate   -  PT/OT evaluate and treat  -  SLP speech and cognitive evaluate and treat  -  Monitor sleep disturbances and establish consistent sleep-wake cycle  -  Monitor for bowel and bladder dysfunction  -  Monitor for shoulder pain, subluxation, & spasticity  -  Monitor for and prevent skin breakdown and pressure ulcers  · Early mobility, repositioning/weight shifting every 20-30 minutes when sitting, turn patient every 2 hours, proper mattress/overlay and chair cushioning, pressure relief/heel protector boots  -  Reviewed discharge options (IP rehab, SNF, HH therapy, and OP therapy)

## 2019-06-20 NOTE — SUBJECTIVE & OBJECTIVE
Past Medical History:   Diagnosis Date    Current smoker     Drug abuse, IV      History reviewed. No pertinent surgical history.  Family History   Problem Relation Age of Onset    No Known Problems Mother     No Known Problems Father      Social History     Tobacco Use    Smoking status: Current Every Day Smoker     Packs/day: 1.00    Smokeless tobacco: Never Used   Substance Use Topics    Alcohol use: No    Drug use: Yes     Types: IV     Review of patient's allergies indicates:  No Known Allergies    Medications: I have reviewed the current medication administration record.      (Not in a hospital admission)    Review of Systems   Constitutional: Negative for chills and fever.   HENT: Negative for ear discharge and ear pain.    Eyes: Negative for pain and redness.   Respiratory: Negative for cough and shortness of breath.    Cardiovascular: Negative for chest pain and leg swelling.   Gastrointestinal: Negative for abdominal distention and abdominal pain.   Genitourinary: Negative for dysuria and enuresis.   Musculoskeletal: Negative for arthralgias and back pain.   Skin: Positive for wound.   Neurological: Positive for weakness.   Psychiatric/Behavioral: Positive for agitation and confusion.     Objective:     Vital Signs (Most Recent):  Temp: 99.9 °F (37.7 °C) (06/19/19 2122)  Pulse: 98 (06/19/19 2331)  Resp: (!) 33 (06/19/19 2331)  BP: 130/62 (06/19/19 2331)  SpO2: 98 % (06/19/19 2331)    Vital Signs Range (Last 24H):  Temp:  [98.1 °F (36.7 °C)-100.5 °F (38.1 °C)]   Pulse:  []   Resp:  [15-35]   BP: (109-172)/()   SpO2:  [96 %-100 %]     Physical Exam   Constitutional: He appears well-developed and well-nourished.   HENT:   Head: Normocephalic.   Eyes: Pupils are equal, round, and reactive to light.   Neck: Normal range of motion.   Cardiovascular: Normal rate.   Pulmonary/Chest: Effort normal.   Abdominal: Soft.   Neurological:   Lethargic, left sided weaness   Nursing note and vitals  reviewed.      Neurological Exam:   LOC: drowsy  Attention Span: poor  Language: No aphasia, delirium from drugs  Articulation: No dysarthria  Orientation: Not oriented to place, Not oriented to time  Visual Fields: Full  EOM (CN III, IV, VI): Full/intact  Pupils (CN II, III): PERRL  Facial Sensation (CN V): Normal  Facial Movement (CN VII): Symmetric facial expression    Gag Reflex: present  Reflexes: flexor plantar responses bilaterally  Motor: Arm left  Plegia 0/5  Leg left  Plegia 0/5  Arm right  Normal 5/5  Leg right Normal 5/5  Cebellar: No evidence of appendicular or axial ataxia  Sensation: Intact to light touch, temperature and vibration  Tone: Flaccid  LUE  and LLE      Laboratory:  CMP:   Recent Labs   Lab 06/19/19  1807   CALCIUM 8.8   ALBUMIN 3.8   PROT 6.8      K 3.7   CO2 20*      BUN 13   CREATININE 0.7   ALKPHOS 62   *   *   BILITOT 1.1*     CBC:   Recent Labs   Lab 06/19/19  1807   WBC 15.63*   RBC 4.89   HGB 15.1   HCT 43.6      MCV 89   MCH 30.9   MCHC 34.6     Lipid Panel:   Recent Labs   Lab 06/19/19  1807   CHOL 144   LDLCALC 80.4   HDL 31*   TRIG 163*     Coagulation:   Recent Labs   Lab 06/19/19  1807   INR 1.1   APTT 29.6     Hgb A1C:   Recent Labs   Lab 06/19/19  1807   HGBA1C 5.2     TSH:   Recent Labs   Lab 06/19/19  1807   TSH 1.015       Diagnostic Results:      Brain imaging:  CT head w/o contrast 6-19-19 results:    1. The study is significantly abnormal.  There is acute intracranial hemorrhage with large parenchymal hemorrhage in the posteromedial right occipital parietal lobe crossing over the splenium of the corpus callosum into the posteromedial left occipital parietal lobe.  There is moderate-to-marked associated edema.  There is approximately 6 mm right to left midline shift.  Subdural blood is also seen along the falx and extending inferiorly along the right tentorium.  The cerebellar tonsils are normal position.  There is compression and  depression of the lateral ventricles with moderate to marked dilatation of the temporal horns.    Vessel Imaging:  CTA Head and neck 6-19-19 results:    Acute intraparenchymal hematoma centered within right and left parietal lobes with extension across the corpus callosum.  Volume of hemorrhage is stable to slightly progressed in comparison to prior exam.  Slightly progressed edema and mass effect resulting in 8 cm leftward midline shift.  No new hemorrhage.    Stable dilatation of the temporal horns of lateral ventricles consistent mild hydrocephalus.    Small volume subdural hemorrhage layering along the falx and tentorium, unchanged    No evidence of large intracranial aneurysm or AVM.  Please note evaluation is limited secondary to extensive hemorrhage and mass effect.  Consider cerebral angiography for further evaluation, as clinically indicated.    Cardiac Evaluation:   EKG 6-19-19 results:  Normal sinus rhythm  Biatrial enlargement  Prolonged QT  Abnormal ECG  When compared with ECG of 16-JUN-2019 12:31,  Vent. rate has increased BY 34 BPM  Non-specific change in ST segment in Inferior leads  QT has lengthened

## 2019-06-20 NOTE — SUBJECTIVE & OBJECTIVE
Past Medical History:   Diagnosis Date    Current smoker     Drug abuse, IV      Past Surgical History:   Procedure Laterality Date    INSERT ARTERIAL LINE  6/20/2019           Current Facility-Administered Medications on File Prior to Encounter   Medication Dose Route Frequency Provider Last Rate Last Dose    [COMPLETED] levETIRAcetam in NaCl (iso-os) IVPB 1,500 mg  1,500 mg Intravenous Once Laura Allen MD   1,500 mg at 06/19/19 1543    [COMPLETED] potassium chloride 10 mEq in 100 mL IVPB  10 mEq Intravenous Q1H Ligia Loaiza  mL/hr at 06/19/19 1330 10 mEq at 06/19/19 1330    [DISCONTINUED] 0.45% NaCl with KCl 20 mEq infusion  75 mL/hr Intravenous Continuous Laura Allen MD 75 mL/hr at 06/19/19 1330 75 mL/hr at 06/19/19 1330    [DISCONTINUED] dexMEDEtomidine (PRECEDEX) 400 mcg in sodium chloride 0.9% 100 mL infusion  0.2 mcg/kg/hr Intravenous Continuous Laura Allen MD 9.2 mL/hr at 06/19/19 1329 0.6 mcg/kg/hr at 06/19/19 1329    [DISCONTINUED] diphenhydrAMINE injection 25 mg  25 mg Intravenous Q6H PRN Jaret Schmidt MD   25 mg at 06/19/19 0638    [DISCONTINUED] enoxaparin injection 40 mg  40 mg Subcutaneous Daily Jay Nunes MD   40 mg at 06/18/19 1653    [DISCONTINUED] haloperidol lactate injection 5 mg  5 mg Intravenous Q6H PRN Laura Allen MD   5 mg at 06/19/19 1222    [DISCONTINUED] levETIRAcetam in NaCl (iso-os) IVPB 1,000 mg  1,000 mg Intravenous Q12H Laura Allen MD        [DISCONTINUED] lorazepam injection 2 mg  2 mg Intravenous Q1H PRN Laura Allen MD   2 mg at 06/19/19 1436    [DISCONTINUED] meropenem-0.9% sodium chloride 1 g/50 mL IVPB  1 g Intravenous Q8H Laura Allen MD 50 mL/hr at 06/19/19 1222 1 g at 06/19/19 1222    [DISCONTINUED] morphine injection 2 mg  2 mg Intravenous Q2H PRN Laura Allen MD   2 mg at 06/19/19 1032    [DISCONTINUED] niCARdipine 40 mg/200 mL infusion  5 mg/hr Intravenous Continuous Laura Allen MD         [DISCONTINUED] niCARdipine 40 mg/200 mL infusion  5 mg/hr Intravenous Continuous Laura Allen MD 25 mL/hr at 06/19/19 1501 5 mg/hr at 06/19/19 1501    [DISCONTINUED] ondansetron injection 4 mg  4 mg Intravenous Q6H PRN Laura Allen MD   4 mg at 06/17/19 2342    [DISCONTINUED] pantoprazole EC tablet 40 mg  40 mg Oral Daily Jay Nunes MD   40 mg at 06/19/19 1032    [DISCONTINUED] pneumoc 13-annette conj-dip cr(PF) (PREVNAR 13 (PF)) 0.5 mL  0.5 mL Intramuscular vaccine x 1 dose Jay Nunes MD        [DISCONTINUED] sodium chloride 0.9% flush 10 mL  10 mL Intravenous PRN Jay Nunes MD         Current Outpatient Medications on File Prior to Encounter   Medication Sig Dispense Refill    buprenorphine-naloxone (SUBOXONE) 8-2 mg Film Place 2 each under the tongue once daily.      clonazePAM (KLONOPIN) 0.5 MG tablet Take 0.5 mg by mouth 2 (two) times daily as needed for Anxiety.      FLUoxetine 40 MG capsule Take 40 mg by mouth once daily.      gabapentin (NEURONTIN) 300 MG capsule Take 300 mg by mouth 3 (three) times daily.        Allergies: Patient has no known allergies.    Family History   Problem Relation Age of Onset    No Known Problems Mother     No Known Problems Father      Social History     Tobacco Use    Smoking status: Current Every Day Smoker     Packs/day: 1.00    Smokeless tobacco: Never Used   Substance Use Topics    Alcohol use: No    Drug use: Yes     Types: IV     Review of Systems   Unable to perform ROS: Mental status change     Objective:     Vitals:    Temp: 99.7 °F (37.6 °C)  Pulse: 79  Rhythm: normal sinus rhythm  BP: 132/76  MAP (mmHg): 97  Resp: (!) 36  SpO2: 98 %  O2 Device (Oxygen Therapy): room air    Temp  Min: 98.1 °F (36.7 °C)  Max: 100.5 °F (38.1 °C)  Pulse  Min: 73  Max: 106  BP  Min: 118/63  Max: 164/70  MAP (mmHg)  Min: 82  Max: 105  Resp  Min: 15  Max: 36  SpO2  Min: 96 %  Max: 100 %    06/19 0701 - 06/20 0700  In: 1302.3 [I.V.:502.3]  Out: 2090  [Urine:2090]   Unmeasured Output  Stool Occurrence: 0       Physical Exam   Constitutional: He appears well-developed and well-nourished. No distress.   HENT:   Head: Normocephalic and atraumatic.   Eyes: Pupils are equal, round, and reactive to light. EOM are normal.   Cardiovascular: Normal rate and regular rhythm.   Pulmonary/Chest: Effort normal and breath sounds normal. No respiratory distress.   Abdominal: Soft. Bowel sounds are normal. He exhibits no distension.   Musculoskeletal: He exhibits no edema or deformity.   Skin: Skin is warm and dry.     Neuro:  --sedation: none  --GCS:  E1 V4 M6  --Mental Status: lethargic, oriented to person and place only  --CN II-XII grossly intact.   --PERRL -3mm  --Motor: L spastic hemiplegia, R side spontaneous and localizes  --sensory: intact to pain    Today I personally reviewed pertinent medications, lines/drains/airways, imaging, cardiology results, laboratory results, microbiology results, notably:    CTH - ICH

## 2019-06-20 NOTE — PROGRESS NOTES
[facility]  Vascular Neurology  Comprehensive Stroke Center  Progress Note    Assessment/Plan:     * Nontraumatic cortical hemorrhage of cerebral hemisphere  34 y/o IV drug abuser with large R ICH with IVH.  Etiology pending workup. MRV negative.  Plan for angio today.    Antithrombotics: None ICH    Statins: None ICH    Aggressive risk factor modification: drug use, smoker     Rehab efforts: The patient has been evaluated by a stroke team provider and the therapy needs have been fully considered based off the presenting complaints and exam findings. The following therapy evaluations are needed: PT evaluate and treat, OT evaluate and treat, SLP evaluate and treat, PM&R evaluate for appropriate placement    Diagnostics ordered/pending: Angiogram    VTE prophylaxis: SCD;s only large ICH    BP parameters: ICH: SBP <140        Brain compression  On HTS 2%  Monitoring closely      Current smoker  Risk factor  Smoking cessation when appropriate    Vasogenic cerebral edema  As seen on cerebral imaging  Monitoring closely    Left hemiparesis  Due to ICH  Aggressive therapy when appropriate    Drug abuse, IV  Stroke risk factor  Counseling when appropriate         6/20:  Restless but cooperative. On 2% HTS    STROKE DOCUMENTATION        NIH Scale:  1a. Level of Consciousness: 0-->Alert, keenly responsive  1b. LOC Questions: 1-->Answers one question correctly  1c. LOC Commands: 1-->Performs one task correctly  2. Best Gaze: 1-->Partial gaze palsy, gaze is abnormal in one or both eyes, but forced deviation or total gaze paresis is not present  3. Visual: 2-->Complete hemianopia  4. Facial Palsy: 1-->Minor paralysis (flattened nasolabial fold, asymmetry on smiling)  5a. Motor Arm, Left: 2-->Some effort against gravity, limb cannot get to or maintain (if cued) 90 (or 45) degrees, drifts down to bed, but has some effort against gravity  5b. Motor Arm, Right: 0-->No drift, limb holds 90 (or 45) degrees for full  10 secs  6a. Motor Leg, Left: 4-->No movement  6b. Motor Leg, Right: 2-->Some effort against gravity, leg falls to bed by 5 secs, but has some effort against gravity  7. Limb Ataxia: 0-->Absent  8. Sensory: 0-->Normal, no sensory loss  9. Best Language: 0-->No aphasia, normal  10. Dysarthria: 1-->Mild-to-moderate dysarthria, patient slurs at least some words and, at worst, can be understood with some difficulty  11. Extinction and Inattention (formerly Neglect): 0-->No abnormality  Total (NIH Stroke Scale): 15       Modified [place]    [place]atherine Coma Scale:13   ABCD2 Score:    [id number] Score:   HAS -BLED Score:   ICH Score:   Hunt & Dougherty Classification:      Hemorrhagic change of an Ischemic Stroke: Does this patient have an ischemic stroke with hemorrhagic changes? No     Neurologic Chief Complaint: Right ICH    Subjective:     Interval History: Patient is seen for follow-up neurological assessment and treatment recommendations: No acute issues or events overnight.  Restless but cooperative.  On HTS 2%.    HPI, Past Medical, Family, and Social History remains the same as documented in the initial encounter.     Review of Systems   Constitutional: Negative for fever.   Eyes: Positive for visual disturbance.   Gastrointestinal: Negative for vomiting.   Neurological: Positive for speech difficulty and weakness.     Scheduled Meds:   folic acid  1 mg Oral Daily    levetiracetam IVPB  500 mg Intravenous Q12H    lidocaine-EPINEPHrine 1%-1:100,000  10 mL Intradermal Once    niCARdipine        senna-docusate 8.6-50 mg  1 tablet Oral BID    thiamine  100 mg Oral Daily     Continuous Infusions:   Sodium Chloride 2% 80 mL/hr at 06/20/19 0915     PRN Meds:calcium gluconate IVPB, calcium gluconate IVPB, calcium gluconate IVPB, hydrALAZINE, magnesium sulfate IVPB, magnesium sulfate IVPB, midazolam, ondansetron, potassium chloride in water **AND** potassium chloride in water **AND** potassium chloride in water, buffered  2% sodium acetate 86meq, sodium chloride 86meq, sterile water for inj IV soln, sodium chloride 0.9%, sodium phosphate IVPB, sodium phosphate IVPB, sodium phosphate IVPB    Objective:     Vital Signs (Most Recent):  Temp: 99.7 °F (37.6 °C) (06/20/19 1000)  Pulse: 79 (06/20/19 1000)  Resp: (!) 36 (06/20/19 1000)  BP: 132/76 (06/20/19 1000)  SpO2: 98 % (06/20/19 1000)  BP Location: Left arm    Vital Signs Range (Last 24H):  Temp:  [98.1 °F (36.7 °C)-100 °F (37.8 °C)]   Pulse:  []   Resp:  [15-36]   BP: (118-164)/(60-85)   SpO2:  [96 %-100 %]   Arterial Line BP: (128-150)/(62-69)   BP Location: Left arm    Physical Exam   Constitutional:   Underweight; multiple small wounds over body   Cardiovascular: Normal rate.   Pulmonary/Chest:   Tachypnic   Skin: Skin is warm. Rash noted.       Neurological Exam:   LOC: alert  Attention Span: poor  Language: No aphasia  Articulation: Dysarthria  Orientation: Not oriented to place, Not oriented to time  Visual Fields: Inconsistent blink to threat on the left  EOM (CN III, IV, VI): Gaze preference  right  Pupils (CN II, III): PERRL  Facial Sensation (CN V): Normal  Facial Movement (CN VII): Lower facial weakness on the Left  Motor: Spontaneous LUE, RUE, RLE; withdraws LLE  Cebellar: No evidence of appendicular or axial ataxia  Sensation: Intact to light touch, temperature   Tone: Increased tone LUE    Laboratory:  CMP:   Recent Labs   Lab 06/20/19  0457  06/20/19  1103 06/20/19  1258   CALCIUM 8.8  8.8  --   --   --    ALBUMIN 3.6  3.6  --   --   --    PROT 6.7  6.7  --   --   --      138   < > 141  --    K 3.0*  3.0*  --   --  3.2*   CO2 21*  21*  --   --   --      103  --   --   --    BUN 11  11  --   --   --    CREATININE 0.7  0.7  --   --   --    ALKPHOS 62  62  --   --   --    ALT 89*  89*  --   --   --    AST 92*  92*  --   --   --    BILITOT 1.3*  1.3*  --   --   --     < > = values in this interval not displayed.     BMP:   Recent Labs   Lab  06/20/19  0457  06/20/19  1103 06/20/19  1258     138   < > 141  --    K 3.0*  3.0*  --   --  3.2*     103  --   --   --    CO2 21*  21*  --   --   --    BUN 11  11  --   --   --    CREATININE 0.7  0.7  --   --   --    CALCIUM 8.8  8.8  --   --   --     < > = values in this interval not displayed.     CBC:   Recent Labs   Lab 06/20/19 0457   WBC 14.42*   RBC 4.63   HGB 14.3   HCT 41.9      MCV 91   MCH 30.9   MCHC 34.1     Lipid Panel:   Recent Labs   Lab 06/19/19 1807   CHOL 144   LDLCALC 80.4   HDL 31*   TRIG 163*     Coagulation:   Recent Labs   Lab 06/20/19 0457   INR 1.1   APTT 28.4     Hgb A1C:   Recent Labs   Lab 06/19/19 1807   HGBA1C 5.2     TSH:   Recent Labs   Lab 06/19/19 1807   TSH 1.015       Diagnostic Results     Brain imaging:  MRI 6/20/2019  Large parenchymal hemorrhage centered within the bilateral parietal lobes extending across midline through the splenium of the corpus callosum corresponding to abnormality seen on CT.  There is thin subdural hemorrhage overlying the right cerebral hemisphere with questionable trace hemorrhage also overlying the left inferior frontal lobe.  Mass effect with approximate 7 mm of leftward midline shift with distortion of the ventricles similar to prior.  Trace prominence of the temporal horns lateral ventricles cannot exclude component of trapped ventricles and early hydrocephalus with remaining ventricular system relatively the normal in caliber.    CT head w/o contrast 6-19-19 results:  1. The study is significantly abnormal.  There is acute intracranial hemorrhage with large parenchymal hemorrhage in the posteromedial right occipital parietal lobe crossing over the splenium of the corpus callosum into the posteromedial left occipital parietal lobe.  There is moderate-to-marked associated edema.  There is approximately 6 mm right to left midline shift.  Subdural blood is also seen along the falx and extending inferiorly along the  right tentorium.  The cerebellar tonsils are normal position.  There is compression and depression of the lateral ventricles with moderate to marked dilatation of the temporal horns.     Vessel Imaging:  MRA/MRV 6/20/2019  MRA head: Unremarkable motion limited MRA of the head specifically without definite abnormal flow related enhancement associated with the region of parenchymal hemorrhage partially included in the study to suggest definite associated vascular malformation.  Correlation and follow up repeat imaging when patient better able to tolerate scanning advised.  Thin subdural hemorrhage overlies the right cerebral hemisphere.  MRV: Motion limited examination as detailed above with dominant right transverse and sigmoid sinuses.  The left sigmoid sinus is not seen and likely obscured by motion.    CTA Head and neck 6-19-19 results:    Acute intraparenchymal hematoma centered within right and left parietal lobes with extension across the corpus callosum.  Volume of hemorrhage is stable to slightly progressed in comparison to prior exam.  Slightly progressed edema and mass effect resulting in 8 cm leftward midline shift.  No new hemorrhage.    Stable dilatation of the temporal horns of lateral ventricles consistent mild hydrocephalus.    Small volume subdural hemorrhage layering along the falx and tentorium, unchanged    No evidence of large intracranial aneurysm or AVM.  Please note evaluation is limited secondary to extensive hemorrhage and mass effect.  Consider cerebral angiography for further evaluation, as clinically indicated.     Cardiac Evaluation:   EKG 6-19-19 results:  Normal sinus rhythm  Biatrial enlargement  Prolonged QT  Abnormal ECG  When compared with ECG of 16-JUN-2019 12:31,  Vent. rate has increased BY 34 BPM  Non-specific change in ST segment in Inferior leads  QT has lengthened      Floresita Gonzales NP  [facility] St[facility]  Department of Vascular Neurology   [facility]  [place]

## 2019-06-20 NOTE — PT/OT/SLP EVAL
"Speech Language Pathology Evaluation  Cognitive/Bedside Swallow    Patient Name:  Shahid Lackey   MRN:  [id number]  Admitting Diagnosis: Nontraumatic cortical hemorrhage of cerebral hemisphere    Recommendations:                  General Recommendations:  Cognitive-linguistic therapy  Diet recommendations:  Dental Soft, Thin   Aspiration Precautions: 1 bite/sip at a time, Avoid talking while eating, Feed only when awake/alert, HOB to 90 degrees, Meds crushed in puree, Monitor for s/s of aspiration and Standard aspiration precautions   General Precautions: Standard,    Communication strategies:  go to room if call light pushed    History:     Past Medical History:   Diagnosis Date    Current smoker     Drug abuse, IV        Past Surgical History:   Procedure Laterality Date    INSERT ARTERIAL LINE  6/20/2019            Social History: Patient lives with mother, was independent, and worked at [employer] previously     Prior Intubation HX:  None prior     Modified Barium Swallow: none prior     Chest X-Rays: 6/15: lungs clear     Prior diet: regular solids/thin liquids        Subjective   "Y'all are my best friends" re:pt statement to SLP/student     Pain/Comfort:  Pain Rating 1: 0/10(10/10 head pain began mid-session)  Pain Addressed 1: Nurse notified, Cessation of Activity  Pain Rating Post-Intervention 1: 7/10  Location 2: head    Objective:     Cognitive Status:    Arousal/Alertness: Alert t/o session with min cues, eyes closed t/o  Attention Sustained attention deficit Pt required mod cues to remain attentive to task; fatigue  Orientation to Person, unable to orient to time (month or year), place, situation  Memory Immediate Recall 100% independently recalled digits and target words up to 4 stimulus items   Problem Solving Categories 100% -convergent cat, min scaffolding A.   and Solution-accurate given attention cues and min scaffolding A. Difficulty noted with compare/contrast   Safety awareness " impaired     Receptive Language:   Comprehension:      Pt comprehends all questions and conversations   Commands  two step basic commands-verbalized undertsanding but unable to follow through with actions    Pragmatics:    Pt kept eyes closed t/o session however attentive given mod A  Occasional inappropriate attempts at humor, cursing noted      Expressive Language:  Verbal:  Fluent   Initiation pt able to initiate all verbal tasks  Naming Confrontation 100% independently named items   Conversational speech fluent in conversation; one string of rapid unintelligible speech observed      Motor Speech:  100% intelligible, no dysarthria     Voice:   clear vocal quality     Visual-Spatial:  Left Neglect significant deficit    Reading:   tba     Written Expression:   tba    Oral Musculature Evaluation  Oral Musculature: WFL(mild general weakness )  Dentition: present and adequate  Secretion Management: adequate  Mucosal Quality: good  Mandibular Strength and Mobility: WFL(good strength and mobility )  Oral Labial Strength and Mobility: WFL(no impairments )  Lingual Strength and Mobility: impaired strength(general weakness )  Volitional Cough: (present and adequate )  Volitional Swallow: (timely swalllow )  Voice Prior to PO Intake: (strong, clear vocal quality )    Bedside Swallow Eval:   Consistencies Assessed:  · Thin liquids tspx2, cup sips x2 (chugging,impulsive)   · Puree tsp pudding x3  · Solids cracker portion x1     Oral Phase:   · WFL   · Good bolus containment, transit, and mastication time. No oral residue observed.     Pharyngeal Phase:   · Timely swallow initiation to palpation, good range of motion of hyolaryngeal excursion, clear vocal quality    · No overt s/s aspiration    Compensatory Strategies  · None    Treatment: Rec Dental soft diet/thin liquids with meds crushed in puree at time 2/2 pt lethargy and impulsivity. SLP education of SLPs role, diet recs, aspiration precs, and POC. Pt verbalized  understanding and agreement of POC. White board updated.     Assessment:     Shahid Lackey is a 33 y.o. male with an SLP diagnosis of Cognitive-Linguistic Impairment and Visio-Spatial Impairment.      Goals:   Multidisciplinary Problems     SLP Goals        Problem: SLP Goal    Goal Priority Disciplines Outcome   SLP Goal     SLP Ongoing (interventions implemented as appropriate)   Description:  SLP goals to be met by 6/27  1.Pt will tolerate diet of dental soft solids/ thin liquids with no overt signs of aspiration  2. Pt will complete simple L scanning tasks with 80% accuracy with max A.  3. Pt will complete orientation tasks with 90% accuracy and min cues to increase safety awareness  4. Pt will complete problem solving/reasoning tasks with 80% accuracy and min cues to increase safety awareness   5. Pt will participate in ongoing assessment of reading and writing   6. Pt will participate in ongoing assessment of cognitive-linguistic skills                      Plan:     · Patient to be seen:  4 x/week   · Plan of Care expires:  07/19/19  · Plan of Care reviewed with:  patient   · SLP Follow-Up:  Yes       Discharge recommendations:  Discharge Facility/Level of Care Needs: rehabilitation facility   Barriers to Discharge:  Level of Skilled Assistance Needed   and Safety Awareness      Time Tracking:     SLP Treatment Date:   06/20/19  Speech Start Time:  0743  Speech Stop Time:  0805     Speech Total Time (min):  22 min    Billable Minutes: Eval 11  and Treatment Swallowing Dysfunction 11    MARCO Potter  06/20/2019

## 2019-06-20 NOTE — PLAN OF CARE
Problem: SLP Goal  Goal: SLP Goal  Outcome: Ongoing (interventions implemented as appropriate)  Evaluation completed.  Pt able to maintain alertness t/o eval despite eyes being closed.  Recent lethargy reported.  Rec dental soft diet with thin liquids, meds crushed in puree with strict aspiration precautions only when alert and upright. MART Trujillo, CCC/SLP  6/20/2019

## 2019-06-20 NOTE — PROGRESS NOTES
Patient arrived to [facility] [facility] <[place]     Type of stroke/diagnosis: R parietal ICH w/ IVH    TPA start and end time: N/A     Thrombectomy start and end time: N/A    Current symptoms:Confusion, L sided weakness and facial droop    Skin assessment done: Y    Wounds noted: multiple abrasions to BUE and BLE    NCC notified: Priyank Delcid NP

## 2019-06-20 NOTE — HOSPITAL COURSE
6/20: TARYN ON. Patient is on cardene and 2% NS, is becoming somewhat more responsive than last night. CTA was negative for vascular malformation. Speech remains confused.

## 2019-06-20 NOTE — SUBJECTIVE & OBJECTIVE
Past Medical History:   Diagnosis Date    Current smoker     Drug abuse, IV      Past Surgical History:   Procedure Laterality Date    INSERT ARTERIAL LINE  6/20/2019          Review of patient's allergies indicates:  No Known Allergies    Scheduled Medications:    folic acid  1 mg Oral Daily    levetiracetam IVPB  500 mg Intravenous Q12H    lidocaine-EPINEPHrine 1%-1:100,000  10 mL Intradermal Once    senna-docusate 8.6-50 mg  1 tablet Oral BID    thiamine  100 mg Oral Daily       PRN Medications: calcium gluconate IVPB, calcium gluconate IVPB, calcium gluconate IVPB, hydrALAZINE, magnesium sulfate IVPB, magnesium sulfate IVPB, ondansetron, potassium chloride in water **AND** potassium chloride in water **AND** potassium chloride in water, buffered 2% sodium acetate 86meq, sodium chloride 86meq, sterile water for inj IV soln, sodium chloride 0.9%, sodium phosphate IVPB, sodium phosphate IVPB, sodium phosphate IVPB    Family History     Problem Relation (Age of Onset)    No Known Problems Mother, Father        Tobacco Use    Smoking status: Current Every Day Smoker     Packs/day: 1.00    Smokeless tobacco: Never Used   Substance and Sexual Activity    Alcohol use: No    Drug use: Yes     Types: IV    Sexual activity: Not Currently     Review of Systems   Reason unable to perform ROS: AMS.     Objective:     Vital Signs (Most Recent):  Temp: 99.4 °F (37.4 °C) (06/20/19 0405)  Pulse: 91 (06/20/19 0715)  Resp: (!) 31 (06/20/19 0715)  BP: (!) 141/74 (06/20/19 0715)  SpO2: 97 % (06/20/19 0715)    Vital Signs (24h Range):  Temp:  [98.1 °F (36.7 °C)-100.5 °F (38.1 °C)] 99.4 °F (37.4 °C)  Pulse:  [] 91  Resp:  [15-35] 31  SpO2:  [96 %-100 %] 97 %  BP: (109-172)/(60-86) 141/74  Arterial Line BP: (128-150)/(62-69) 128/62     Body mass index is 18.83 kg/m².    Physical Exam   Constitutional: He appears well-developed and well-nourished.   mittent to R hand    HENT:   Head: Normocephalic and atraumatic.    Eyes: Right eye exhibits no discharge. Left eye exhibits no discharge.   Neck: Neck supple.   Cardiovascular: Normal rate and intact distal pulses.   Pulmonary/Chest: Effort normal. No respiratory distress.   Abdominal: Soft. He exhibits no distension.   Musculoskeletal: He exhibits no edema or deformity.   L sided weakness    Neurological: He is alert. He is disoriented.     Facial asymmetry    Skin: Skin is warm and dry.   Abrasions    Psychiatric: His affect is inappropriate. He is agitated. Cognition and memory are impaired.          Diagnostic Results:   Labs: Reviewed  ECG: Reviewed  X-Ray: Reviewed  CT: Reviewed  MRI: Reviewed

## 2019-06-20 NOTE — PROGRESS NOTES
Pharmacokinetic Initial Assessment: IV Vancomycin    Assessment/Plan:    Initiate intravenous vancomycin with loading dose of 1750 mg once.  Continue with vancomycin 1000 mg IVPB every 12 hours.  Desired empiric serum trough concentration is 10 to 20 mcg/mL.  Draw vancomycin trough level prior to 4th dose on 06/21/2019 at 0900.  Pharmacy will continue to follow and monitor vancomycin.      Please contact pharmacy at extension [phone] with any questions regarding this assessment.     Thank you for the consult,   Joshua Gerard     Patient brief summary:  Shahid Lackey is a 33 y.o. male initiated on antimicrobial therapy with IV Vancomycin for treatment of suspected bacteremia.     Drug Allergies:   Review of patient's allergies indicates:  No Known Allergies    Actual Body Weight:   59.4 kg    Renal Function:   Estimated Creatinine Clearance: 126.1 mL/min (based on SCr of 0.7 mg/dL).    CBC (last 72 hours):  Recent Labs   Lab Result Units 06/17/19  0557 06/18/19  0342 06/19/19  0540 06/19/19  1807   WBC K/uL 20.20* 15.50* 13.70* 15.63*   Hemoglobin g/dL 14.9 13.3* 13.9* 15.1   Hemoglobin A1C %  --   --   --  5.2   Hematocrit % 44.4 40.2 41.2 43.6   Platelets K/uL 275 244 294 314   Gran% % 78.0* 83.5* 83.3* 83.3*   Lymph% % 9.0* 8.8* 9.7* 6.9*   Mono% % 9.0 7.5 6.7 9.1   Eosinophil% % 0.0 0.0 0.0 0.0   Basophil% % 0.0 0.2 0.3 0.1   Differential Method  Manual Automated Automated Automated       Metabolic Panel (last 72 hours):  Recent Labs   Lab Result Units 06/17/19  0011 06/17/19  0557 06/17/19  1208 06/17/19  1809 06/17/19  2339 06/18/19  0342 06/18/19  1204 06/18/19  1832 06/18/19  2358 06/19/19  0540 06/19/19  1445 06/19/19  1807 06/19/19  1953   Sodium mmol/L 138 142 141 140 139 140 139 139 140 138  --  139  --    Potassium mmol/L 3.6 3.9 3.3* 3.7 3.6 3.8 3.6 3.5 3.8 3.5  --  3.7  --    Chloride mmol/L 110 107 110 110 109 107 106 105 104 105  --  103  --    CO2 mmol/L 17* 23 22* 21* 20* 22* 20* 21* 22* 21*   --  20*  --    Glucose mg/dL 108 108 116* 105 103 102 103 98 95 97  --  97  --    Glucose, UA   --   --   --   --   --   --   --   --   --   --   --   --  Negative   BUN, Bld mg/dL 9 10 11 13 13 12 13 13 14 15  --  13  --    Creatinine mg/dL 0.8 0.9 0.7 0.7 0.7 0.8 0.8 0.8 0.8 0.8  --  0.7  --    Albumin g/dL  --   --   --   --   --   --   --   --   --   --  3.7 3.8  --    Total Bilirubin mg/dL  --   --   --   --   --   --   --   --   --   --  1.0 1.1*  --    Alkaline Phosphatase U/L  --   --   --   --   --   --   --   --   --   --  61 62  --    AST U/L  --   --   --   --   --   --   --   --   --   --  139* 124*  --    ALT U/L  --   --   --   --   --   --   --   --   --   --  111* 105*  --        Drug levels (last 3 results):  No results for input(s): VANCOMYCINRA, VANCOMYCINPE, VANCOMYCINTR in the last 72 hours.    Microbiologic Results:  Microbiology Results (last 7 days)     Procedure Component Value Units Date/Time    Blood culture [[id number]]     Order Status:  No result Specimen:  Blood     Blood culture [[id number]]     Order Status:  No result Specimen:  Blood     Culture, Respiratory with Gram Stain [[id number]]     Order Status:  No result Specimen:  Respiratory

## 2019-06-20 NOTE — HPI
Shahid Lackey is a 33-year-old male with PMHx of drug abuse.  Patient presented to [place] with a drug overdose.  He was combative, PEC'd, and transferred to [facility].  At [facility], he was unable to move his L side. CTH revealed large ICH with IVH R parietal lobe with midline shift. Hospital course at OSH complicated by delirium (treated with benzodiazepines, morphine, Haldol and Precedex drip), rhabdomyolysis, & leuckocytosis .Transferred to [facility] on 6/19 for further evaluation and management.  Upon admission, drug screen  Positive for cocaine, heroin, THC, amphetamines and benzo's. CAT revealed acute intraparenchymal hematoma centered within right and left parietal lobes with extension across the corpus callosum.  Volume of hemorrhage is stable to slightly progressed in comparison to prior exam per radiology. On 3% and Cardene gtt.      Functional History: Per sister, patient lives in [place] with aunt in a single story home with 1 step to enter.  Prior to admission, (I) with ADLs and mobility.  DME: none.

## 2019-06-20 NOTE — ASSESSMENT & PLAN NOTE
Positive for THC, amphetamines, cocaine, benzo and opiates on admit  -TTE for eval of poss endocarditis  - extremity U/S to eval for DVT  TTE completed, no signs of endocarditis

## 2019-06-20 NOTE — PLAN OF CARE
Procedure complete. 5Fr. Exoseal deployed to RFA. VSS. To lie flat X 2 hours. HOB up @ 2030. Moved to bed. Rt. Groin soft and dry. No ooze or hematoma. Report to JESSICA Pedro  Transported back to unit

## 2019-06-20 NOTE — PLAN OF CARE
Problem: Adult Inpatient Plan of Care  Goal: Plan of Care Review  Outcome: Ongoing (interventions implemented as appropriate)  Nutrition assessment completed. Please see RD note for details.    Recommendation/Intervention:   Continue dental soft diet. If po intake < 50% of meals, add Boost TID to increase caloric intake.     RD to monitor.    Goals: Pt to tolerate >85% EEN and EPN by RD follow up  Nutrition Goal Status: new  Communication of RD Recs: reviewed with RN

## 2019-06-20 NOTE — ASSESSMENT & PLAN NOTE
33 M with polysubstance abuse presents with large right parietooccipital ICH and IVH.  ICH score of 3. Exam improving, RUE paresis.     -Admitted to ncc for q 1 hr neuro checks  -SBP less than 140  -Na goal 145-155; currently 139 on 2% Na  -CTA without vascular malformation  -MRI/A/V to assess for venous thrombosis   -We will continue to monitor closely, call for any change in exam

## 2019-06-20 NOTE — CONSULTS
Therapy with vancomycin complete and/or consult discontinued by provider.  Pharmacy will sign off, please re-consult as needed.    Carina Longo, PharmD, [facility]  Neurocritical Care Pharmacist  [phone]

## 2019-06-20 NOTE — PROGRESS NOTES
Wound Care Documentation:    Wound care consulted:  yes    LDA added: yes    Type of Wound: multiple abrasions    Location of Wound: BUE and BLE

## 2019-06-20 NOTE — PLAN OF CARE
Problem: Adult Inpatient Plan of Care  Goal: Plan of Care Review  POC reviewed with pt at 0600. Pt unable verbalized understanding due to altered mental status. Questions and concerns addressed. No acute events overnight. Cardene gtt on/off to maintain SBP <140. 2% continued at 40 mL/hr. Sitter with pt in room. Will continue to monitor. See flowsheets for full assessment and VS info.

## 2019-06-20 NOTE — HOSPITAL COURSE
Hospital course prolonged for the following reasons:     6/20:  Restless but cooperative. On 2% HTS  6/21: Patient continues on 2% and Cardene gtt. Following simple commands.   6/22: On 2% gtt. Off cardene gtt.   6/23: TCDs daily, results pending. Of note possible step down tomorrow.  6/24: patient off cardene and HTS, plans for step down  6/25: patient CEC'd by  on 6/18, needs rehab placement, no behavior issues or agitation, no longer requiring restraints, patient interested in drug rehab resources, TCD with mildly elevated velocities left MCA territory on 6/24 6/26: Pt stepped down overnight. Psych consulted yesterday and recommended continuing PEC. Zyprexa ordered PRN for non-redirectable agitation. STD work up as suggested including hep panel. Pleasant on exam this AM - cooperating for most part but continues to say sorry I am just tired.   6/27: NAEON. Patient in pleasant mood this morning, motivated/interactive; per nursing patient easily redirected overnight, PRN zyprexa received, discontinued CEC per psych recs. Psych recommend STD panel; Hx of hep C, + hep C Ab, ordered HCV RNA.  6/28: NAEON. Pending placement to R rehab pending insurance auth.

## 2019-06-20 NOTE — ASSESSMENT & PLAN NOTE
32 y/o IV drug abuser with large R ICH with IVH    Antithrombotics: None ICH    Statins: None ICH    Aggressive risk factor modification: drug use, smoker     Rehab efforts: The patient has been evaluated by a stroke team provider and the therapy needs have been fully considered based off the presenting complaints and exam findings. The following therapy evaluations are needed: PT evaluate and treat, OT evaluate and treat, SLP evaluate and treat, PM&R evaluate for appropriate placement    Diagnostics ordered/pending: MRI head without contrast to assess brain parenchyma, TTE to assess cardiac function/status     VTE prophylaxis: SCD;s only large ICH    BP parameters: ICH: SBP <140

## 2019-06-20 NOTE — ASSESSMENT & PLAN NOTE
Large right IPH with IVH and midline shift on CTH  -admit to NCC  -CTA ordered  -NSGY consulted   -SBP goal <140  -cardene gtt  -2% hypertonic saline   -q 1 hour neuro checks  -q 1 hour vital signs  -daily cbc, cmp, mag, phos  -tte to eval for endocarditis  -broad spec ABX and cultures   -PT/OT/SLP

## 2019-06-20 NOTE — CONSULTS
Inpatient consult to Physical Medicine Rehab  Consult performed by: Felicia Marrero NP  Consult ordered by: Rekha Jeronimo PA-C  Reason for consult: assess rehab needs        Reviewed patient history and current admission.  Rehab team following.  Full consult to follow.    ANGEL Delgadillo, FNP-C  Physical Medicine & Rehabilitation   06/20/2019  Spectralink: [id number]

## 2019-06-20 NOTE — SUBJECTIVE & OBJECTIVE
Interval History: 6/20: TARYN ON. Patient is on cardene and 2% NS, is becoming somewhat more responsive than last night. CTA was negative for vascular malformation. Speech remains confused.       Medications:  Continuous Infusions:   Sodium Chloride 2% 80 mL/hr at 06/20/19 0915    sodium chloride 3%       Scheduled Meds:   folic acid  1 mg Oral Daily    levetiracetam IVPB  500 mg Intravenous Q12H    lidocaine-EPINEPHrine 1%-1:100,000  10 mL Intradermal Once    senna-docusate 8.6-50 mg  1 tablet Oral BID    thiamine  100 mg Oral Daily     PRN Meds:calcium gluconate IVPB, calcium gluconate IVPB, calcium gluconate IVPB, hydrALAZINE, magnesium sulfate IVPB, magnesium sulfate IVPB, ondansetron, potassium chloride in water **AND** potassium chloride in water **AND** potassium chloride in water, buffered 2% sodium acetate 86meq, sodium chloride 86meq, sterile water for inj IV soln, sodium chloride 0.9%, sodium phosphate IVPB, sodium phosphate IVPB, sodium phosphate IVPB     Review of Systems  Objective:     Weight: 59.4 kg (131 lb)  Body mass index is 18.8 kg/m².  Vital Signs (Most Recent):  Temp: 99.7 °F (37.6 °C) (06/20/19 1000)  Pulse: 79 (06/20/19 1000)  Resp: (!) 36 (06/20/19 1000)  BP: 132/76 (06/20/19 1000)  SpO2: 98 % (06/20/19 1000) Vital Signs (24h Range):  Temp:  [98.1 °F (36.7 °C)-100.5 °F (38.1 °C)] 99.7 °F (37.6 °C)  Pulse:  [] 79  Resp:  [15-36] 36  SpO2:  [96 %-100 %] 98 %  BP: (118-164)/(60-85) 132/76  Arterial Line BP: (128-150)/(62-69) 141/65     Date 06/20/19 0700 - 06/21/19 0659   Shift 9808-5579 0908-5513 3199-9317 24 Hour Total   INTAKE   I.V.(mL/kg) 604.9(10.2)   604.9(10.2)   IV Piggyback 100   100   Shift Total(mL/kg) 704.9(11.9)   704.9(11.9)   OUTPUT   Urine(mL/kg/hr) 550   550   Shift Total(mL/kg) 550(9.3)   550(9.3)   Weight (kg) 59.4 59.4 59.4 59.4                        Urethral Catheter 06/15/19 2005 Latex 16 Fr. (Active)   Site Assessment Clean;Intact 6/20/2019  4:05 AM  "  Collection Container Urimeter 6/20/2019  4:05 AM   Securement Method secured to top of thigh w/ adhesive device 6/20/2019  4:05 AM   Catheter Care Performed yes 6/20/2019  4:05 AM   Reason for Continuing Urinary Catheterization Critically ill in ICU requiring intensive monitoring 6/20/2019  4:05 AM   CAUTI Prevention Bundle StatLock in place w 1" slack;Intact seal between catheter & drainage tubing;Drainage bag off the floor;Green sheeting clip in use;No dependent loops or kinks;Drainage bag not overfilled (<2/3 full);Drainage bag below bladder 6/20/2019  4:05 AM   Output (mL) 200 mL 6/20/2019 10:00 AM            Urethral Catheter (Active)       Neurosurgery Physical Exam  General: laying in bed, asleep, comfortable.  Head: normocephalic, atraumatic  Neurologic:   GCS: Eyes 2; easily arousable. Verbal 4, Motor 6  Mental Status: awake, however not opening eyes unless stimulated, but able to participate in converation  Oriented to self but not place (knows is hospital) and not year.   Cranial nerves: PERRL, R sided gaze preference.   Localizes briskly in the Lower extremities.   RUE following commands, no movement of the left upper extremity.   DTR: 2+ symmetrically throughout.  Pulmonary: normal respirations, no signs of respiratory distress  Abdomen: soft, non-distended, not tender to palpation                     Significant Labs:  Recent Labs   Lab 06/19/19  0540 06/19/19  1807  06/20/19  0457 06/20/19  0618 06/20/19  1103 06/20/19  1258   GLU 97 97  --  102  102  --   --   --     139   < > 138  138 142 141  --    K 3.5 3.7  --  3.0*  3.0*  --   --  3.2*    103  --  103  103  --   --   --    CO2 21* 20*  --  21*  21*  --   --   --    BUN 15 13  --  11  11  --   --   --    CREATININE 0.8 0.7  --  0.7  0.7  --   --   --    CALCIUM 8.8 8.8  --  8.8  8.8  --   --   --    MG  --   --   --  2.2  --   --   --     < > = values in this interval not displayed.     Recent Labs   Lab 06/19/19  0540 " 06/19/19 1807 06/20/19 0457   WBC 13.70* 15.63* 14.42*   HGB 13.9* 15.1 14.3   HCT 41.2 43.6 41.9    314 328     Recent Labs   Lab 06/19/19  1445 06/19/19 1807 06/20/19 0457   INR 1.1 1.1 1.1   APTT 34.2* 29.6 28.4     Microbiology Results (last 7 days)     Procedure Component Value Units Date/Time    Blood culture [[id number]]     Order Status:  Canceled Specimen:  Blood     Blood culture [[id number]]     Order Status:  Canceled Specimen:  Blood     Culture, Respiratory with Gram Stain [[id number]]     Order Status:  No result Specimen:  Respiratory         CMP:   Recent Labs   Lab 06/19/19  0540 06/19/19  1445 06/19/19 1807 06/20/19  0457 06/20/19  0618 06/20/19  1103 06/20/19  1258   GLU 97  --  97  --  102  102  --   --   --    CALCIUM 8.8  --  8.8  --  8.8  8.8  --   --   --    ALBUMIN  --  3.7 3.8  --  3.6  3.6  --   --   --    PROT  --  6.6 6.8  --  6.7  6.7  --   --   --      --  139   < > 138  138 142 141  --    K 3.5  --  3.7  --  3.0*  3.0*  --   --  3.2*   CO2 21*  --  20*  --  21*  21*  --   --   --      --  103  --  103  103  --   --   --    BUN 15  --  13  --  11  11  --   --   --    CREATININE 0.8  --  0.7  --  0.7  0.7  --   --   --    ALKPHOS  --  61 62  --  62  62  --   --   --    ALT  --  111* 105*  --  89*  89*  --   --   --    AST  --  139* 124*  --  92*  92*  --   --   --    BILITOT  --  1.0 1.1*  --  1.3*  1.3*  --   --   --     < > = values in this interval not displayed.     CRP: No results for input(s): CRP in the last 48 hours.  ESR: No results for input(s): POCESR, ERYTHROCYTES in the last 48 hours.  LFTs:   Recent Labs   Lab 06/19/19  1445 06/19/19  1807 06/20/19  0457   * 105* 89*  89*   * 124* 92*  92*   ALKPHOS 61 62 62  62   BILITOT 1.0 1.1* 1.3*  1.3*   PROT 6.6 6.8 6.7  6.7   ALBUMIN 3.7 3.8 3.6  3.6       Significant Diagnostics:  CT: No results found in the last 24 hours.  Echoencephalography: No results found in the last  24 hours.  MRI: No results found in the last 24 hours.

## 2019-06-20 NOTE — PROGRESS NOTES
[facility]  Neurocritical Care  Progress Note    Admit Date: 6/19/2019  Service Date: 06/20/2019  Length of Stay: 1    Subjective:     Chief Complaint: Nontraumatic cortical hemorrhage of cerebral hemisphere    History of Present Illness: Pt is a 33 y.o. Male with Hx of IVDU who presents for  large right IPH with IVH and midline shift.   Pt was at [facility] and was admitted for polysubstance abuse and was +for cocaine, heroin, THC, amphetamines. Pt was subsequently PEC'd at [facility]. Today he stopped moving his Left side. CTH was done at [facility] and showed large IPH with IVH. Pt was transferred to [facility] for monitoring in ICU, Neurosurgery consulted.       Hospital Course: 6/19/2019 Admit to [facility] for ICH  6/20: stable overnight, no signs of withdrawal, sbp within parameters on cardene, vasc neurology consulted    Past Medical History:   Diagnosis Date    Current smoker     Drug abuse, IV      Past Surgical History:   Procedure Laterality Date    INSERT ARTERIAL LINE  6/20/2019           Current Facility-Administered Medications on File Prior to Encounter   Medication Dose Route Frequency Provider Last Rate Last Dose    [COMPLETED] levETIRAcetam in NaCl (iso-os) IVPB 1,500 mg  1,500 mg Intravenous Once Laura Allen MD   1,500 mg at 06/19/19 1543    [COMPLETED] potassium chloride 10 mEq in 100 mL IVPB  10 mEq Intravenous Q1H Ligia Loaiza  mL/hr at 06/19/19 1330 10 mEq at 06/19/19 1330    [DISCONTINUED] 0.45% NaCl with KCl 20 mEq infusion  75 mL/hr Intravenous Continuous Laura Allen MD 75 mL/hr at 06/19/19 1330 75 mL/hr at 06/19/19 1330    [DISCONTINUED] dexMEDEtomidine (PRECEDEX) 400 mcg in sodium chloride 0.9% 100 mL infusion  0.2 mcg/kg/hr Intravenous Continuous Laura Allen MD 9.2 mL/hr at 06/19/19 1329 0.6 mcg/kg/hr at 06/19/19 1329    [DISCONTINUED] diphenhydrAMINE injection 25 mg  25 mg Intravenous Q6H PRN Jaret Schmidt MD   25 mg at  06/19/19 0638    [DISCONTINUED] enoxaparin injection 40 mg  40 mg Subcutaneous Daily Jay Nunes MD   40 mg at 06/18/19 1653    [DISCONTINUED] haloperidol lactate injection 5 mg  5 mg Intravenous Q6H PRN Laura Allen MD   5 mg at 06/19/19 1222    [DISCONTINUED] levETIRAcetam in NaCl (iso-os) IVPB 1,000 mg  1,000 mg Intravenous Q12H Laura Allen MD        [DISCONTINUED] lorazepam injection 2 mg  2 mg Intravenous Q1H PRN Laura Allen MD   2 mg at 06/19/19 1436    [DISCONTINUED] meropenem-0.9% sodium chloride 1 g/50 mL IVPB  1 g Intravenous Q8H Laura Allen MD 50 mL/hr at 06/19/19 1222 1 g at 06/19/19 1222    [DISCONTINUED] morphine injection 2 mg  2 mg Intravenous Q2H PRN Laura Allen MD   2 mg at 06/19/19 1032    [DISCONTINUED] niCARdipine 40 mg/200 mL infusion  5 mg/hr Intravenous Continuous Laura Allen MD        [DISCONTINUED] niCARdipine 40 mg/200 mL infusion  5 mg/hr Intravenous Continuous Laura Allen MD 25 mL/hr at 06/19/19 1501 5 mg/hr at 06/19/19 1501    [DISCONTINUED] ondansetron injection 4 mg  4 mg Intravenous Q6H PRN Laura Allen MD   4 mg at 06/17/19 2342    [DISCONTINUED] pantoprazole EC tablet 40 mg  40 mg Oral Daily Jay Nunes MD   40 mg at 06/19/19 1032    [DISCONTINUED] pneumoc 13-annette conj-dip cr(PF) (PREVNAR 13 (PF)) 0.5 mL  0.5 mL Intramuscular vaccine x 1 dose Jay Nunes MD        [DISCONTINUED] sodium chloride 0.9% flush 10 mL  10 mL Intravenous PRN Jay Nunes MD         Current Outpatient Medications on File Prior to Encounter   Medication Sig Dispense Refill    buprenorphine-naloxone (SUBOXONE) 8-2 mg Film Place 2 each under the tongue once daily.      clonazePAM (KLONOPIN) 0.5 MG tablet Take 0.5 mg by mouth 2 (two) times daily as needed for Anxiety.      FLUoxetine 40 MG capsule Take 40 mg by mouth once daily.      gabapentin (NEURONTIN) 300 MG capsule Take 300 mg by mouth 3 (three) times daily.        Allergies: Patient  has no known allergies.    Family History   Problem Relation Age of Onset    No Known Problems Mother     No Known Problems Father      Social History     Tobacco Use    Smoking status: Current Every Day Smoker     Packs/day: 1.00    Smokeless tobacco: Never Used   Substance Use Topics    Alcohol use: No    Drug use: Yes     Types: IV     Review of Systems   Unable to perform ROS: Mental status change     Objective:     Vitals:    Temp: 99.7 °F (37.6 °C)  Pulse: 79  Rhythm: normal sinus rhythm  BP: 132/76  MAP (mmHg): 97  Resp: (!) 36  SpO2: 98 %  O2 Device (Oxygen Therapy): room air    Temp  Min: 98.1 °F (36.7 °C)  Max: 100.5 °F (38.1 °C)  Pulse  Min: 73  Max: 106  BP  Min: 118/63  Max: 164/70  MAP (mmHg)  Min: 82  Max: 105  Resp  Min: 15  Max: 36  SpO2  Min: 96 %  Max: 100 %    06/19 0701 - 06/20 0700  In: 1302.3 [I.V.:502.3]  Out: 2090 [Urine:2090]   Unmeasured Output  Stool Occurrence: 0       Physical Exam   Constitutional: He appears well-developed and well-nourished. No distress.   HENT:   Head: Normocephalic and atraumatic.   Eyes: Pupils are equal, round, and reactive to light. EOM are normal.   Cardiovascular: Normal rate and regular rhythm.   Pulmonary/Chest: Effort normal and breath sounds normal. No respiratory distress.   Abdominal: Soft. Bowel sounds are normal. He exhibits no distension.   Musculoskeletal: He exhibits no edema or deformity.   Skin: Skin is warm and dry.     Neuro:  --sedation: none  --GCS:  E1 V4 M6  --Mental Status: lethargic, oriented to person and place only  --CN II-XII grossly intact.   --PERRL -3mm  --Motor: L  spastic hemiplegia, R side spontaneous and localizes  --sensory: intact to pain    Today I personally reviewed pertinent medications, lines/drains/airways, imaging, cardiology results, laboratory results, microbiology results, notably:    CTH - ICH    Assessment/Plan:     Neuro  * Nontraumatic cortical hemorrhage of cerebral hemisphere  Large right IPH with IVH and  midline shift on CTH  -admit to NCC  -CTA ordered  -NSGY consulted   -SBP goal <140  -cardene gtt  -2% hypertonic saline   -q 1 hour neuro checks  -q 1 hour vital signs  -daily cbc, cmp, mag, phos  -tte to eval for endocarditis  -broad spec ABX and cultures   -PT/OT/SLP  06/20: concern for underlying vascular lesion, 4v angio ordered, sbp parameter less then 140mmHg    Vasogenic cerebral edema  Mass effect and midline shift increased on repeat ct  Escalate hypertonics to goal of 145-150    Psychiatric  Drug abuse, IV  Positive for THC, amphetamines, cocaine, benzo and opiates on admit  -TTE for eval of poss endocarditis  - extremity U/S to eval for DVT  TTE completed, no signs of endocarditis    Oncology  Leukocytosis  WBC elevated  Hx IVDU  -pan cx  -broad spec ABX  -trending down from OSH  Has been on and off various abxs for duration of hospitalization extending to 4 days  Cultures remain negative, d/c abx    Orthopedic  Rhabdomyolysis  CPK still elevated   -continue IVF replacements  -trend CPK  Improving, creatinine stable, ivfs while po intake low          The patient is being Prophylaxed for:  Venous Thromboembolism with: Mechanical  Stress Ulcer with: None  Ventilator Pneumonia with: not applicable    Activity Orders          Diet Dysphagia Soft (IDDSI Level 6) Thin: Dysphagia 3 (Mechanical Soft Chopped) starting at 06/20 0911      CRC 35 min  Full Code    Garry Maxwell MD  Neurocritical Care  [facility]

## 2019-06-20 NOTE — ED NOTES
Blood cultures not drawn, Neuro CC MD ok'd with previous nurse that blood cultures were not needed since they were done at [facility] yesterday. Previous RN said she would go back and DC these orders when she gets a moment.

## 2019-06-20 NOTE — ED NOTES
Spoke with neuro CC about canceling blood cultures per primary RN request since pt had blood cultures drawn at [facility] today before transferring here. Asked provider if it was ok to go ahead and give the zosyn and vanc. Provider busy with another patient, asked me to hold off until he can view the patients chart. WALESKA.

## 2019-06-20 NOTE — H&P
[facility]  Neurocritical Care  History & Physical    Admit Date: 6/19/2019  Service Date: 06/19/2019  Length of Stay: 0    Subjective:     Chief Complaint: Nontraumatic cortical hemorrhage of cerebral hemisphere    History of Present Illness: Pt is a 33 y.o. Male with Hx of IVDU who presents for  large right IPH with IVH and midline shift.   Pt was at [facility] and was admitted for polysubstance abuse and was +for cocaine, heroin, THC, amphetamines. Pt was subsequently PEC'd at [facility]. Today he stopped moving his Left side. CTH was done at [facility] and showed large IPH with IVH. Pt was transferred to [facility] for monitoring in ICU, Neurosurgery consulted.       History reviewed. No pertinent past medical history.  History reviewed. No pertinent surgical history.   Current Facility-Administered Medications on File Prior to Encounter   Medication Dose Route Frequency Provider Last Rate Last Dose    [COMPLETED] levETIRAcetam in NaCl (iso-os) IVPB 1,500 mg  1,500 mg Intravenous Once Laura Allen MD   1,500 mg at 06/19/19 1543    [COMPLETED] potassium chloride 10 mEq in 100 mL IVPB  10 mEq Intravenous Q1H Ligia Loaiza  mL/hr at 06/19/19 1330 10 mEq at 06/19/19 1330    [DISCONTINUED] 0.45% NaCl with KCl 20 mEq infusion  75 mL/hr Intravenous Continuous Laura Allen MD 75 mL/hr at 06/19/19 1330 75 mL/hr at 06/19/19 1330    [DISCONTINUED] dexMEDEtomidine (PRECEDEX) 400 mcg in sodium chloride 0.9% 100 mL infusion  0.2 mcg/kg/hr Intravenous Continuous Laura Allen MD 9.2 mL/hr at 06/19/19 0940 0.6 mcg/kg/hr at 06/19/19 0940    [DISCONTINUED] dexMEDEtomidine (PRECEDEX) 400 mcg in sodium chloride 0.9% 100 mL infusion  0.2 mcg/kg/hr Intravenous Continuous Laura Allen MD 9.2 mL/hr at 06/19/19 1329 0.6 mcg/kg/hr at 06/19/19 1329    [DISCONTINUED] diphenhydrAMINE injection 25 mg  25 mg Intravenous Q6H PRN Jaret Schmidt MD   25 mg at 06/19/19 0647     [DISCONTINUED] enoxaparin injection 40 mg  40 mg Subcutaneous Daily Jay Nunes MD   40 mg at 06/18/19 1653    [DISCONTINUED] haloperidol lactate injection 5 mg  5 mg Intravenous Q6H PRN Laura Allen MD   5 mg at 06/19/19 1222    [DISCONTINUED] levETIRAcetam in NaCl (iso-os) IVPB 1,000 mg  1,000 mg Intravenous Q12H Laura Allen MD        [DISCONTINUED] lorazepam injection 2 mg  2 mg Intravenous Q1H PRN Laura Allen MD   2 mg at 06/19/19 1436    [DISCONTINUED] meropenem-0.9% sodium chloride 1 g/50 mL IVPB  1 g Intravenous Q8H Laura Allen MD 50 mL/hr at 06/19/19 1222 1 g at 06/19/19 1222    [DISCONTINUED] morphine injection 2 mg  2 mg Intravenous Q2H PRN Laura Allen MD   2 mg at 06/19/19 1032    [DISCONTINUED] niCARdipine 40 mg/200 mL infusion  5 mg/hr Intravenous Continuous Laura Allen MD        [DISCONTINUED] niCARdipine 40 mg/200 mL infusion  5 mg/hr Intravenous Continuous Laura Allen MD 25 mL/hr at 06/19/19 1501 5 mg/hr at 06/19/19 1501    [DISCONTINUED] ondansetron injection 4 mg  4 mg Intravenous Q6H PRN Laura Allen MD   4 mg at 06/17/19 2342    [DISCONTINUED] pantoprazole EC tablet 40 mg  40 mg Oral Daily Jay Nunes MD   40 mg at 06/19/19 1032    [DISCONTINUED] pneumoc 13-annette conj-dip cr(PF) (PREVNAR 13 (PF)) 0.5 mL  0.5 mL Intramuscular vaccine x 1 dose Jay Nunes MD        [DISCONTINUED] potassium chloride 20 mEq in 100 mL IVPB (FOR CENTRAL LINE ADMINISTRATION ONLY)  40 mEq Intravenous Once Ligia Loaiza MD        [DISCONTINUED] sodium chloride 0.9% flush 10 mL  10 mL Intravenous PRN Jay Nunes MD         No current outpatient medications on file prior to encounter.      Allergies: Patient has no known allergies.    Family History   Problem Relation Age of Onset    No Known Problems Mother     No Known Problems Father      Social History     Tobacco Use    Smoking status: Current Every Day Smoker     Packs/day: 1.00    Smokeless  tobacco: Never Used   Substance Use Topics    Alcohol use: No    Drug use: Yes     Types: IV     Review of Systems   Unable to perform ROS: Mental status change     Objective:     Vitals:    Temp: 98.1 °F (36.7 °C)  Pulse: 92  BP: 138/68  MAP (mmHg): 95  Resp: 16  SpO2: 98 %  O2 Device (Oxygen Therapy): room air    Temp  Min: 98.1 °F (36.7 °C)  Max: 100.5 °F (38.1 °C)  Pulse  Min: 41  Max: 106  BP  Min: 109/71  Max: 175/102  MAP (mmHg)  Min: 85  Max: 129  Resp  Min: 15  Max: 35  SpO2  Min: 96 %  Max: 100 %    No intake/output data recorded.           Physical Exam   Constitutional: He appears well-developed and well-nourished. No distress.   HENT:   Head: Normocephalic and atraumatic.   Eyes: Pupils are equal, round, and reactive to light. EOM are normal.   Cardiovascular: Normal rate and regular rhythm.   Pulmonary/Chest: Effort normal and breath sounds normal. No respiratory distress.   Abdominal: Soft. Bowel sounds are normal. He exhibits no distension.   Musculoskeletal: He exhibits no edema or deformity.   Skin: Skin is warm and dry.     Neuro:  --sedation: none  --GCS:  E1 V4 M6  --Mental Status: lethargic, oriented to person and place only  --CN II-XII grossly intact.   --PERRL -3mm  --Motor: L side hemiplegia, LUE withdraw, LLE no movement, R side spontaneous and localizes  --sensory: intact to pain    Today I personally reviewed pertinent medications, lines/drains/airways, imaging, cardiology results, laboratory results, microbiology results, notably:    CTH - ICH    Assessment/Plan:     Neuro  * Nontraumatic cortical hemorrhage of cerebral hemisphere  Large right IPH with IVH and midline shift on CTH  -admit to NCC  -CTA ordered  -NSGY consulted   -SBP goal <140  -cardene gtt  -2% hypertonic saline   -q 1 hour neuro checks  -q 1 hour vital signs  -daily cbc, cmp, mag, phos  -tte to eval for endocarditis  -broad spec ABX and cultures   -PT/OT/SLP    Oncology  Leukocytosis  WBC elevated  Hx IVDU  -pan  cx  -broad spec ABX  -trending down from OSH    GI  Transaminitis  Monitor LFTs daily  -elevated on admit  -Hx of Hepatitis C  -avoid nephrotoxic drugs     Orthopedic  Rhabdomyolysis  CPK still elevated   -continue IVF replacements  -trend CPK    Other  Weakness  L sided hemiplegia 2/2 to ICH    History of intravenous drug abuse  Positive for THC, amphetamines, cocaine, benzo and opiates on admit  -TTE for eval of poss endocarditis  - extremity U/S to eval for DVT          The patient is being Prophylaxed for:  Venous Thromboembolism with: Mechanical  Stress Ulcer with: None  Ventilator Pneumonia with: not applicable    Activity Orders          Diet NPO: NPO starting at 06/19 1851        Full Code    Critical care time 45 minutes     Rekha Jeronimo PA-C  Neurocritical Care  [facility]

## 2019-06-20 NOTE — CONSULTS
Wound care consult received for multiple skin tears and abrasions over arms, legs, and ankle. Spoke with Nurse Pedro and discussed care with pt family at bedside regarding wound care order set in place for skin tears.  Wound care team to sign off.  Please reconsult for any other wound care needs. [id number]

## 2019-06-20 NOTE — HPI
34 y/o male with long history of IV drug abuse was brought in to [facility] on 6-15-19 with drug overdose and was extremely combative and was PEC/CEC and transferred to [facility] to the ICU for closer monitoring. On 6-19-19 noticed no movement on left side but unsure when last known time really is. CT scan revealed large ICH with IVH R parietal lobe with midline shift. Patient transferred to [facility] for closer monitoring and evalaution. Patiet drug screen +for cocaine, heroin, THC, amphetamines and benzo's. NSGY consulted, no interventions. MRV negative.  Angiogram completed w/ no signs of AVM or aneurysm but concern for vasculitis. 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 PT/OT/SLP recommended rehab.    While in [facility] patient was also treated for rhabdomylosis. Psych followed patient during stay for acute delirium which improved, CEC discontinued 6/27. Follow up with Vascular neurology in 4-6 weeks, Neurosurgery follow up scheduled on 7/18/19. For details in hospital stay please see hospital coarse below.

## 2019-06-20 NOTE — PT/OT/SLP EVAL
Physical Therapy Evaluation    Patient Name:  Shahid Lackey   MRN:  [id number]    Recommendations:     Discharge Recommendations:  rehabilitation facility   Discharge Equipment Recommendations: (TBD)   Barriers to discharge: Unable to obtain prior level of function or support at home; patient requiring significant assist at this time    Assessment:     Shahid Lackey is a 33 y.o. male admitted with a medical diagnosis of Nontraumatic cortical hemorrhage of cerebral hemisphere.  He presents with the following impairments/functional limitations:  impaired endurance, weakness, impaired self care skills, impaired balance, decreased coordination, gait instability, impaired functional mobilty, impaired cognition, decreased upper extremity function, decreased lower extremity function, decreased ROM, abnormal tone, impaired fine motor, pain, decreased safety awareness, impaired coordination, impaired cardiopulmonary response to activity, impaired joint extensibility. The patient is confused and disoriented, he was unable to follow simple motor commands this session and maintained his eyes closed 90% of treatment. He moves his R upper and lower extremity spontaneously but not his L, demonstrates variable L upper>lower extremity tone. He performed bed mobility and scooting with maximum assistance. Attempted to call patient's sister to obtain history, unable to reach her at this time. Per chart review, the patient appears to be below his functional baseline and would benefit from inpatient rehab to maximize his functional potential.     Rehab Prognosis: Good; patient would benefit from acute skilled PT services to address these deficits and reach maximum level of function.    Recent Surgery: * No surgery found *      Plan:     During this hospitalization, patient to be seen 4 x/week to address the identified rehab impairments via gait training, therapeutic activities, therapeutic exercises, neuromuscular re-education and  "progress toward the following goals:    · Plan of Care Expires:  07/20/19    Subjective     Chief Complaint: "My legs feel like they are cramping"; "Can you get me a cigarette? Can I light this?"; thanked patient and he responded "No time, any problem"  Patient/Family Comments/goals: unable to state, he requests to return to supine and defer standing due to leg pain  Pain/Comfort:  · Pain Rating 1: (pain in bilateral legs R>L, unable to rate)  · Pain Addressed 1: Reposition, Cessation of Activity, Nurse notified  · Pain Rating Post-Intervention 1: (remained)    Patients cultural, spiritual, Scientologist conflicts given the current situation: no    Living Environment:  Unable to obtain, patient is disoriented. Per his report, he was working at [employer] and living with his parents.   Prior to admission, patients level of function was unclear.  Equipment used at home: (unable to assess).  DME owned (not currently used): none.  Upon discharge, patient will have assistance from unclear.    Objective:     Communicated with RN prior to session.  Patient found HOB elevated with arterial line, bed alarm, blood pressure cuff, peripheral IV, telemetry, pulse ox (continuous), restraints  upon PT entry to room.    General Precautions: Standard, fall   Orthopedic Precautions:N/A   Braces: N/A     Exams:    Cognitive Exam  Patient is A&O x1 and follows 10% of one -step commands    Fine Motor Coordination   -       Unable to assess     Postural Exam Patient presented with the following abnormalities:    -       Rounded shoulders  -       Forward head  -       Kyphosis  -       Posterior pelvic tilt  -       Weight shift posteriorly and R  -       L knee extended with increased tone in quad   Sensation    -       Light touch patient giving inconsistent report, unable to localize light touch bilateral lower extremities   Skin Integrity/Edema     -       Skin integrity: abrasions to bilateral lower legs and arms  -       Edema: NA   R " LE ROM WFL; ankle clonus, tight calf   R LE Strength Moving spontaneously hip flexion, knee ext/flex, and ankle DF/PF   L LE ROM WFL, variable tone, in sitting edge of bed- increased quad tone to MAS 2, in supine MAS 1+; ankle PF and inversion tone MAS 2 with mult beat clonus; hip flexor/extensor tone MAS 1+   L LE Strength  No spontaneous movement     Balance          Static Sitting maximum assistance, posterior and R lean, bilateral hands in weight bearing for proprioception and for  L shoulder approximation                Functional Mobility:    Bed Mobility  Rolling to R: maximum assistance  Supine to Sit:  maximum assistance   Sit to supine: total assistance  Scoot in sitting: maximum assistance   Scoot to head of bed: total assistance x2           Therapeutic Activities and Exercises:   Patient educated on role of therapy, goals of session, benefits of out of bed mobility. Patient agreeable to mobilize with therapy.  Discussed PT plan of care during hospitalization. Patient educated that they need to call for assistance to mobilize out of bed. Whiteboard updated as appropriate. Patient educated on how their diagnosis impacts their mobility within PT scope of practice. Obtained pressure boot for L leg, positioned L upper extremity in abduction and elevation; heels floating; neutral joint alignment.     AM-PAC 6 CLICK MOBILITY  Total Score:8     Patient left HOB elevated with all lines intact, call button in reach, bed alarm on, restraints reapplied at end of session, RN notified and sitter present.    GOALS:   Multidisciplinary Problems     Physical Therapy Goals        Problem: Physical Therapy Goal    Goal Priority Disciplines Outcome Goal Variances Interventions   Physical Therapy Goal     PT, PT/OT Ongoing (interventions implemented as appropriate)     Description:  Goals to be met by: 2019     Patient will increase functional independence with mobility by performin. Supine to sit with Moderate  Assistance  2. Sit to supine with Moderate Assistance  3. Sit to stand transfer with maximum assistance   4. Gait  x 5 feet with Maximum Assistance using least restrictive device   5. Sitting at edge of bed x10 minutes with Minimal Assistance  6. Lower extremity exercise program x20 reps per handout, with assistance as needed to improve strength and ROM and increase activity tolerance.                       History:     Past Medical History:   Diagnosis Date    Current smoker     Drug abuse, IV        Past Surgical History:   Procedure Laterality Date    INSERT ARTERIAL LINE  6/20/2019            Time Tracking:     PT Received On: 06/20/19  PT Start Time: 1158     PT Stop Time: 1220  PT Total Time (min): 22 min     Billable Minutes: Evaluation 22      Aura Cameron, PT  06/20/2019

## 2019-06-20 NOTE — CONSULTS
Stroke team attempted to see the patient several times. He had left the NCC to MRI, tried again to see him while in the MRI but patient still undergoing study, upon the next attempt patient was taken to IR angiogram. Will see the patient in the morning.      Chantal Cornelius MD  PGY-II, Neurology  Pager: [phone]

## 2019-06-20 NOTE — PLAN OF CARE
Per Sister,  Patient currently sees :    Dr. Jimmy Golden  [facility]  [address] [place] [address]   [phone]       06/20/19 1345   Discharge Assessment   Assessment Type Discharge Planning Assessment   Confirmed/corrected address and phone number on facesheet? Yes   Assessment information obtained from? Caregiver  (sister, Areli)   Expected Length of Stay (days) 5   Communicated expected length of stay with patient/caregiver yes   Prior to hospitilization cognitive status: Alert/Oriented   Prior to hospitalization functional status: Independent   Current cognitive status: Unable to Assess   Current Functional Status: Completely Dependent   Facility Arrived From: [facility]   Lives With other relative(s)  (aunt and 2 cousins)   Able to Return to Prior Arrangements yes   Is patient able to care for self after discharge? Unable to determine at this time (comments)   Who are your caregiver(s) and their phone number(s)? Areli Valdez (sister) [phone]   Patient's perception of discharge disposition home or selfcare   Readmission Within the Last 30 Days no previous admission in last 30 days   Patient currently being followed by outpatient case management? No   Patient currently receives any other outside agency services? No   Equipment Currently Used at Home none   Do you have any problems affording any of your prescribed medications? No   Is the patient taking medications as prescribed? no   Does the patient have transportation home? Yes   Transportation Anticipated family or friend will provide   Does the patient receive services at the [facility]? No   Discharge Plan A Home with family   Discharge Plan B Rehab   DME Needed Upon Discharge  other (see comments)  (tbd)   Patient/Family in Agreement with Plan yes       Discharge/ My Health Packet Folder Given to patient/family:      yes      PCP:  Artem Rcie MD      Pharmacy:    [facility] - [address]  [address] [place] [address]  Phone: [phone] Fax: [phone]      Emergency Contacts:  Extended Emergency Contact Information  Primary Emergency Contact: Maranda Goldstein  Address: [address] [place] [address] [facility]  Home Phone: [phone]  Mobile Phone: [phone]  Relation: Other      Insurance:  Payor: MEDICAID / Plan: [facility] CONNECT / Product Type: Managed Medicaid /     Adriana Wesley RN, CCRN-K, CCM  Neuro-Critical Care   X [phone]

## 2019-06-21 NOTE — ASSESSMENT & PLAN NOTE
Area of vasogenic cerebral edema identified when reviewing brain imaging in the territory of the R middle cerebral artery. There is mass effect associated with it. We will continue to monitor the patients clinical exam for any worsening of symptoms which may indicate expansion of the stroke or the area of the edema resulting in the clinical change. 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

## 2019-06-21 NOTE — SUBJECTIVE & OBJECTIVE
Medications:  Continuous Infusions:   Sodium Chloride 2% 45 mL/hr at 06/21/19 1319     Scheduled Meds:   amLODIPine  5 mg Oral Daily    folic acid  1 mg Oral Daily    levetiracetam IVPB  500 mg Intravenous Q12H    lidocaine-EPINEPHrine 1%-1:100,000  10 mL Intradermal Once    magnesium sulfate IVPB  2 g Intravenous Q2H    senna-docusate 8.6-50 mg  1 tablet Oral BID    thiamine  100 mg Oral Daily     PRN Meds:acetaminophen, calcium gluconate IVPB, calcium gluconate IVPB, calcium gluconate IVPB, hydrALAZINE, magnesium sulfate IVPB, magnesium sulfate IVPB, midazolam, ondansetron, potassium chloride in water **AND** potassium chloride in water **AND** potassium chloride in water, buffered 2% sodium acetate 86meq, sodium chloride 86meq, sterile water for inj IV soln, sodium chloride 0.9%, sodium chloride 0.9%, sodium phosphate IVPB, sodium phosphate IVPB, sodium phosphate IVPB     Review of Systems    Objective:     Weight: 59.4 kg (131 lb)  Body mass index is 18.8 kg/m².  Vital Signs (Most Recent):  Temp: 99.8 °F (37.7 °C) (06/21/19 1115)  Pulse: 62 (06/21/19 1200)  Resp: (!) 45 (06/21/19 1200)  BP: (!) 141/72 (06/21/19 1200)  SpO2: 99 % (06/21/19 1200) Vital Signs (24h Range):  Temp:  [98.7 °F (37.1 °C)-100 °F (37.8 °C)] 99.8 °F (37.7 °C)  Pulse:  [] 62  Resp:  [14-63] 45  SpO2:  [95 %-100 %] 99 %  BP: (115-153)/(57-98) 141/72  Arterial Line BP: (124-151)/(61-75) 139/66     Date 06/21/19 0700 - 06/22/19 0659   Shift 2745-1396 6003-9310 5704-1471 24 Hour Total   INTAKE   I.V.(mL/kg) 611.2(10.3)   611.2(10.3)   IV Piggyback 100   100   Shift Total(mL/kg) 711.2(12)   711.2(12)   OUTPUT   Urine(mL/kg/hr) 325   325   Shift Total(mL/kg) 325(5.5)   325(5.5)   Weight (kg) 59.4 59.4 59.4 59.4                        Urethral Catheter 06/15/19 2005 Latex 16 Fr. (Active)   Site Assessment Clean;Intact 6/20/2019  4:05 AM   Collection Container Urimeter 6/20/2019  4:05 AM   Securement Method secured to top of  "thigh w/ adhesive device 6/20/2019  4:05 AM   Catheter Care Performed yes 6/20/2019  4:05 AM   Reason for Continuing Urinary Catheterization Critically ill in ICU requiring intensive monitoring 6/20/2019  4:05 AM   CAUTI Prevention Bundle StatLock in place w 1" slack;Intact seal between catheter & drainage tubing;Drainage bag off the floor;Green sheeting clip in use;No dependent loops or kinks;Drainage bag not overfilled (<2/3 full);Drainage bag below bladder 6/20/2019  4:05 AM   Output (mL) 200 mL 6/20/2019 10:00 AM            Urethral Catheter (Active)       Neurosurgery Physical Exam    General: laying in bed, asleep, comfortable.  Head: normocephalic, atraumatic  Neurologic:   GCS: Eyes 2; easily arousable. Verbal 4, Motor 6  Mental Status: awake, however not opening eyes unless stimulated, but able to participate in converation  Oriented to self but not place (knows is hospital) and not year.   Cranial nerves: PERRL, R sided gaze preference.   Localizes briskly in the Lower extremities.   RUE following commands, no movement of the left upper extremity.   DTR: 2+ symmetrically throughout.  Pulmonary: normal respirations, no signs of respiratory distress  Abdomen: soft, non-distended, not tender to palpation                     Significant Labs:  Recent Labs   Lab 06/19/19  1807  06/20/19  0457  06/20/19  1258  06/20/19  2355 06/21/19  0253 06/21/19  0631   GLU 97  --  102  102  --   --   --   --  117*  --       < > 138  138   < >  --    < > 147* 148* 150*   K 3.7  --  3.0*  3.0*  --  3.2*  --   --  3.4*  --      --  103  103  --   --   --   --  113*  --    CO2 20*  --  21*  21*  --   --   --   --  22*  --    BUN 13  --  11  11  --   --   --   --  12  --    CREATININE 0.7  --  0.7  0.7  --   --   --   --  0.7  --    CALCIUM 8.8  --  8.8  8.8  --   --   --   --  8.8  --    MG  --   --  2.2  --   --   --   --  2.2  --     < > = values in this interval not displayed.     Recent Labs   Lab " 06/19/19 1807 06/20/19 0457 06/21/19  0253   WBC 15.63* 14.42* 14.30*   HGB 15.1 14.3 12.9*   HCT 43.6 41.9 38.8*    328 328     Recent Labs   Lab 06/19/19  1445 06/19/19 1807 06/20/19 0457   INR 1.1 1.1 1.1   APTT 34.2* 29.6 28.4     Microbiology Results (last 7 days)     Procedure Component Value Units Date/Time    Blood culture [[id number]]     Order Status:  Canceled Specimen:  Blood     Blood culture [[id number]]     Order Status:  Canceled Specimen:  Blood     Culture, Respiratory with Gram Stain [[id number]]     Order Status:  No result Specimen:  Respiratory         CMP:   Recent Labs   Lab 06/19/19 1807 06/20/19 0457  06/20/19  1258  06/20/19  2355 06/21/19  0253 06/21/19  0631   GLU 97  --  102  102  --   --   --   --  117*  --    CALCIUM 8.8  --  8.8  8.8  --   --   --   --  8.8  --    ALBUMIN 3.8  --  3.6  3.6  --   --   --   --  3.3*  --    PROT 6.8  --  6.7  6.7  --   --   --   --  6.0  --       < > 138  138   < >  --    < > 147* 148* 150*   K 3.7  --  3.0*  3.0*  --  3.2*  --   --  3.4*  --    CO2 20*  --  21*  21*  --   --   --   --  22*  --      --  103  103  --   --   --   --  113*  --    BUN 13  --  11  11  --   --   --   --  12  --    CREATININE 0.7  --  0.7  0.7  --   --   --   --  0.7  --    ALKPHOS 62  --  62  62  --   --   --   --  54*  --    *  --  89*  89*  --   --   --   --  74*  --    *  --  92*  92*  --   --   --   --  72*  --    BILITOT 1.1*  --  1.3*  1.3*  --   --   --   --  0.9  --     < > = values in this interval not displayed.     CRP: No results for input(s): CRP in the last 48 hours.  ESR: No results for input(s): POCESR, ERYTHROCYTES in the last 48 hours.  LFTs:   Recent Labs   Lab 06/19/19  1807 06/20/19  0457 06/21/19  0253   * 89*  89* 74*   * 92*  92* 72*   ALKPHOS 62 62  62 54*   BILITOT 1.1* 1.3*  1.3* 0.9   PROT 6.8 6.7  6.7 6.0   ALBUMIN 3.8 3.6  3.6 3.3*       Significant Diagnostics:  CT: No  results found in the last 24 hours.  Echoencephalography: No results found in the last 24 hours.  MRI: No results found in the last 24 hours.

## 2019-06-21 NOTE — PLAN OF CARE
Problem: SLP Goal  Goal: SLP Goal  SLP goals to be met by 6/27  1.Pt will tolerate diet of dental soft solids/ thin liquids with no overt signs of aspiration  2. Pt will complete simple L scanning tasks with 80% accuracy with max A.  3. Pt will complete orientation tasks with 90% accuracy and min cues to increase safety awareness  4. Pt will complete problem solving/reasoning tasks with 80% accuracy and min cues to increase safety awareness   5. Pt will participate in ongoing assessment of reading and writing   6. Pt will participate in ongoing assessment of cognitive-linguistic skills     Outcome: Ongoing (interventions implemented as appropriate)  Goals remain appropriate, cont POC. MART Trujillo, CCC/SLP  6/21/2019

## 2019-06-21 NOTE — PT/OT/SLP PROGRESS
"Speech Language Pathology Treatment    Patient Name:  Shahid Lackey   MRN:  [id number]  Admitting Diagnosis: Nontraumatic cortical hemorrhage of cerebral hemisphere    Recommendations:                 General Recommendations:  Dysphagia therapy, Speech/language therapy and Cognitive-linguistic therapy  Diet recommendations:  Dental Soft, Liquid Diet Level: Thin   Aspiration Precautions: 1 bite/sip at a time, Check for pocketing/oral residue, HOB to 90 degrees, No straws and Strict aspiration precautions   General Precautions: Standard, aspiration, fall, dental soft  Communication strategies:  provide increased time to answer and go to room if call light pushed    Subjective     "I choked on a cracker and my mom thought that was enough to bring me in."     Pain/Comfort:  · Pain Rating 1: (number not provided)  · Location - Side 1: Right  · Location 1: leg  · Pain Addressed 1: Reposition, Distraction  · Pain Rating Post-Intervention 1: 0/10    Objective:     Has the patient been evaluated by SLP for swallowing?   Yes  Keep patient NPO? No   Current Respiratory Status:        Pt seen at end of PT session.  ,ax cues for pt to track SLP from R to almost midline.  Pt able to hold head in midline position for several seconds. Pt able to state number of fingers shown on L x1 accurately despite L eye gaze.  Pt oriented to self and place indptly.  Verbalized confabulation/confusion evident with poor attn to structured task.  Sexually inappropriate comment made x1 with immediate apology by pt.  Pt tolerated cracker and thin liquid trials with mildly increased mastication time and cough x1 with thin via straw.  No overt s/s aspiration with thin by cup.  Swallow precs, orientation and PCO reviewed.  Education to be ongoing.     Assessment:     Shahid Lackey is a 33 y.o. male with an SLP diagnosis of Dysphagia, Cognitive-Linguistic Impairment and Visio-Spatial Impairment.     Goals:   Multidisciplinary Problems     SLP Goals  "       Problem: SLP Goal    Goal Priority Disciplines Outcome   SLP Goal     SLP Ongoing (interventions implemented as appropriate)   Description:  SLP goals to be met by 6/27  1.Pt will tolerate diet of dental soft solids/ thin liquids with no overt signs of aspiration  2. Pt will complete simple L scanning tasks with 80% accuracy with max A.  3. Pt will complete orientation tasks with 90% accuracy and min cues to increase safety awareness  4. Pt will complete problem solving/reasoning tasks with 80% accuracy and min cues to increase safety awareness   5. Pt will participate in ongoing assessment of reading and writing   6. Pt will participate in ongoing assessment of cognitive-linguistic skills                      Plan:     · Patient to be seen:  4 x/week   · Plan of Care expires:  07/19/19  · Plan of Care reviewed with:  patient   · SLP Follow-Up:  Yes       Discharge recommendations:  rehabilitation facility   Barriers to Discharge:  Level of Skilled Assistance Needed      Time Tracking:     SLP Treatment Date:   06/21/19  Speech Start Time:  1414  Speech Stop Time:  1430     Speech Total Time (min):  16 min    Billable Minutes: Speech Therapy Individual 8 and Treatment Swallowing Dysfunction 8    MART Trujillo, CCC-SLP  06/21/2019

## 2019-06-21 NOTE — PROGRESS NOTES
Notified NCC team RODNEY Christy, that pt becoming slighting more agitated as compared to early in the shift.  No new orders, but stated would be by to assess patient shortly.  Will continue to monitor.

## 2019-06-21 NOTE — PROGRESS NOTES
[facility]  Vascular Neurology  Comprehensive Stroke Center  Progress Note    Assessment/Plan:     * Nontraumatic cortical hemorrhage of cerebral hemisphere  34 y/o IV drug abuser with large R ICH with IVH.  Etiology pending workup. MRV negative.  Angiogram completed w/ no signs of AVM or aneurysm but concern for vasculitis. 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     Patient remains on 2% and Cardene. Following simple commands    Antithrombotics: None ICH    Statins: None ICH    Aggressive risk factor modification: drug use, smoker     Rehab efforts: The patient has been evaluated by a stroke team provider and the therapy needs have been fully considered based off the presenting complaints and exam findings. The following therapy evaluations are needed: PT evaluate and treat, OT evaluate and treat, SLP evaluate and treat, PM&R evaluate for appropriate placement    Diagnostics ordered/pending: CT for any changes in neurological status    VTE prophylaxis: SCD;s only large ICH    BP parameters: ICH: SBP <140        Brain compression  On HTS 2%  Monitoring closely      Current smoker  Risk factor  Smoking cessation when appropriate    Vasogenic cerebral edema  Area of vasogenic cerebral edema identified when reviewing brain imaging in the territory of the R middle cerebral artery. There is mass effect associated with it. We will continue to monitor the patients clinical exam for any worsening of symptoms which may indicate expansion of the stroke or the area of the edema resulting in the clinical change. The pattern is suggestive of drug induced vasculopathy etiology        Left hemiparesis  Due to ICH  Aggressive therapy when appropriate    Drug abuse, IV  Stroke risk factor  Counseling when appropriate         6/20:  Restless but cooperative. On 2% HTS  6/21: Patient continues on 2% and Cardene gtt. Following simple commands.     STROKE DOCUMENTATION        NIH Scale:  1a.  Level of Consciousness: 0-->Alert, keenly responsive  1b. LOC Questions: 2-->Answers neither question correctly  1c. LOC Commands: 1-->Performs one task correctly  2. Best Gaze: 1-->Partial gaze palsy, gaze is abnormal in one or both eyes, but forced deviation or total gaze paresis is not present  3. Visual: 2-->Complete hemianopia  4. Facial Palsy: 1-->Minor paralysis (flattened nasolabial fold, asymmetry on smiling)  5a. Motor Arm, Left: 3-->No effort against gravity, limb falls  5b. Motor Arm, Right: 0-->No drift, limb holds 90 (or 45) degrees for full 10 secs  6a. Motor Leg, Left: 4-->No movement  6b. Motor Leg, Right: 3-->No effort against gravity, leg falls to bed immediately  7. Limb Ataxia: 0-->Absent  8. Sensory: 0-->Normal, no sensory loss  9. Best Language: 0-->No aphasia, normal  10. Dysarthria: 1-->Mild-to-moderate dysarthria, patient slurs at least some words and, at worst, can be understood with some difficulty  11. Extinction and Inattention (formerly Neglect): 0-->No abnormality  Total (NIH Stroke Scale): 18       Modified Blanco Score: 0  Catherine Coma Scale:    ABCD2 Score:    [id number] Score:   HAS -BLED Score:   ICH Score:3  Hunt & Dougherty Classification:      Hemorrhagic change of an Ischemic Stroke: Does this patient have an ischemic stroke with hemorrhagic changes? No     Neurologic Chief Complaint: Right ICH    Subjective:     Interval History: Patient is seen for follow-up neurological assessment and treatment recommendations:     Patient continues on 2% and Cardene gtt. Following simple commands.     HPI, Past Medical, Family, and Social History remains the same as documented in the initial encounter.     Review of Systems   Constitutional: Negative for fever.   Eyes: Positive for visual disturbance.   Gastrointestinal: Negative for vomiting.   Neurological: Positive for speech difficulty and weakness.     Scheduled Meds:   amLODIPine  5 mg Oral Daily    folic acid  1 mg Oral Daily     levetiracetam IVPB  500 mg Intravenous Q12H    lidocaine-EPINEPHrine 1%-1:100,000  10 mL Intradermal Once    senna-docusate 8.6-50 mg  1 tablet Oral BID    thiamine  100 mg Oral Daily     Continuous Infusions:   Sodium Chloride 2% 95 mL/hr at 06/21/19 1100     PRN Meds:calcium gluconate IVPB, calcium gluconate IVPB, calcium gluconate IVPB, hydrALAZINE, magnesium sulfate IVPB, magnesium sulfate IVPB, midazolam, ondansetron, potassium chloride in water **AND** potassium chloride in water **AND** potassium chloride in water, buffered 2% sodium acetate 86meq, sodium chloride 86meq, sterile water for inj IV soln, sodium chloride 0.9%, sodium chloride 0.9%, sodium phosphate IVPB, sodium phosphate IVPB, sodium phosphate IVPB    Objective:     Vital Signs (Most Recent):  Temp: 100 °F (37.8 °C) (06/21/19 0730)  Pulse: 78 (06/21/19 1000)  Resp: (!) 31 (06/21/19 1000)  BP: (!) 151/84 (06/21/19 1000)  SpO2: 99 % (06/21/19 1000)  BP Location: Left arm    Vital Signs Range (Last 24H):  Temp:  [98.7 °F (37.1 °C)-100 °F (37.8 °C)]   Pulse:  []   Resp:  [14-63]   BP: (115-153)/(57-98)   SpO2:  [95 %-100 %]   Arterial Line BP: (124-151)/(61-75)   BP Location: Left arm    Physical Exam   Constitutional:   Underweight; multiple small wounds over body   Cardiovascular: Normal rate.   Pulmonary/Chest:   Tachypnic   Skin: Skin is warm. Rash noted.       Neurological Exam:   LOC: alert  Attention Span: poor  Language: No aphasia  Articulation: Dysarthria  Orientation: Not oriented to place, Not oriented to time  Visual Fields: Inconsistent blink to threat on the left  EOM (CN III, IV, VI): Gaze preference  right  Pupils (CN II, III): PERRL  Facial Sensation (CN V): Normal  Facial Movement (CN VII): Lower facial weakness on the Left  Motor: Spontaneous LUE 2/5, RUE 5/5, RLE 5/5; withdraws LLE 0/5  Cebellar: No evidence of appendicular or axial ataxia  Sensation: Decreased sensation on   Tone: Increased tone  [facility] [place] [facility]    Laboratory:  CMP:   Recent Labs   Lab 06/21/19  0253 06/21/19  0631   CALCIUM 8.8  --    ALBUMIN 3.3*  --    PROT 6.0  --    * 150*   K 3.4*  --    CO2 22*  --    *  --    BUN 12  --    CREATININE 0.7  --    ALKPHOS 54*  --    ALT 74*  --    AST 72*  --    BILITOT 0.9  --      BMP:   Recent Labs   Lab 06/21/19  0253 06/21/19  0631   * 150*   K 3.4*  --    *  --    CO2 22*  --    BUN 12  --    CREATININE 0.7  --    CALCIUM 8.8  --      CBC:   Recent Labs   Lab 06/21/19 0253   WBC 14.30*   RBC 4.24*   HGB 12.9*   HCT 38.8*      MCV 92   MCH 30.4   MCHC 33.2     Lipid Panel:   Recent Labs   Lab 06/19/19  1807   CHOL 144   LDLCALC 80.4   HDL 31*   TRIG 163*     Coagulation:   Recent Labs   Lab 06/20/19  0457   INR 1.1   APTT 28.4     Hgb A1C:   Recent Labs   Lab 06/19/19  1807   HGBA1C 5.2     TSH:   Recent Labs   Lab 06/19/19  1807   TSH 1.015       Diagnostic Results     Brain imaging:  MRI 6/20/2019  Large parenchymal hemorrhage centered within the bilateral parietal lobes extending across midline through the splenium of the corpus callosum corresponding to abnormality seen on CT.  There is thin subdural hemorrhage overlying the right cerebral hemisphere with questionable trace hemorrhage also overlying the left inferior frontal lobe.  Mass effect with approximate 7 mm of leftward midline shift with distortion of the ventricles similar to prior.  Trace prominence of the temporal horns lateral ventricles cannot exclude component of trapped ventricles and early hydrocephalus with remaining ventricular system relatively the normal in caliber.    CT head w/o contrast 6-19-19 results:  1. The study is significantly abnormal.  There is acute intracranial hemorrhage with large parenchymal hemorrhage in the posteromedial right occipital parietal lobe crossing over the splenium of the corpus callosum into the posteromedial left occipital parietal lobe.  There is moderate-to-marked  associated edema.  There is approximately 6 mm right to left midline shift.  Subdural blood is also seen along the falx and extending inferiorly along the right tentorium.  The cerebellar tonsils are normal position.  There is compression and depression of the lateral ventricles with moderate to marked dilatation of the temporal horns.     Vessel Imaging:    Angiogram 6/21/2019  Preliminary interpretation: No evidence of aneurysm or AVM. Diffuse irregularity of multiple small and medium sized arteries with multiple areas of narrowing/beading raising question of CNS vasculitis or RCVS,. Please see Imaging report for full details.    MRA/MRV 6/20/2019  MRA head: Unremarkable motion limited MRA of the head specifically without definite abnormal flow related enhancement associated with the region of parenchymal hemorrhage partially included in the study to suggest definite associated vascular malformation.  Correlation and follow up repeat imaging when patient better able to tolerate scanning advised.  Thin subdural hemorrhage overlies the right cerebral hemisphere.  MRV: Motion limited examination as detailed above with dominant right transverse and sigmoid sinuses.  The left sigmoid sinus is not seen and likely obscured by motion.    CTA Head and neck 6-19-19 results:    Acute intraparenchymal hematoma centered within right and left parietal lobes with extension across the corpus callosum.  Volume of hemorrhage is stable to slightly progressed in comparison to prior exam.  Slightly progressed edema and mass effect resulting in 8 cm leftward midline shift.  No new hemorrhage.    Stable dilatation of the temporal horns of lateral ventricles consistent mild hydrocephalus.    Small volume subdural hemorrhage layering along the falx and tentorium, unchanged    No evidence of large intracranial aneurysm or AVM.  Please note evaluation is limited secondary to extensive hemorrhage and mass effect.  Consider cerebral  angiography for further evaluation, as clinically indicated.     Cardiac Evaluation:   EKG 6-19-19 results:  Normal sinus rhythm  Biatrial enlargement  Prolonged QT  Abnormal ECG  When compared with ECG of 16-JUN-2019 12:31,  Vent. rate has increased BY 34 BPM  Non-specific change in ST segment in Inferior leads  QT has lengthened      Doreen Pratt NP  [facility]  Department of Vascular Neurology   [facility]

## 2019-06-21 NOTE — ASSESSMENT & PLAN NOTE
"-h/o of IV drug abuse   -large R ICH with IVH on imaging  -on 2% Na gtt  -s/p angiogram-->per radiology, "No evidence of aneurysm or AVM. Diffuse irregularity of multiple small and medium sized arteries with multiple areas of narrowing/beading raising question of CNS vasculitis or RCVS."    See hospital course for functional, cognitive/speech/language, and nutrition/swallow status.      Recommendations  -  Encourage mobility, OOB in chair at least 3 hours per day, and early ambulation as appropriate   -  PT/OT evaluate and treat  -  SLP speech and cognitive evaluate and treat  -  Monitor sleep disturbances and establish consistent sleep-wake cycle  -  Monitor for bowel and bladder dysfunction  -  Monitor for shoulder pain, subluxation, & spasticity  -  Monitor for and prevent skin breakdown and pressure ulcers  · Early mobility, repositioning/weight shifting every 20-30 minutes when sitting, turn patient every 2 hours, proper mattress/overlay and chair cushioning, pressure relief/heel protector boots  -  Reviewed discharge options (IP rehab, SNF, HH therapy, and OP therapy)    "

## 2019-06-21 NOTE — PT/OT/SLP PROGRESS
Physical Therapy Treatment    Patient Name:  Shahid Lackey   MRN:  [id number]    Recommendations:     Discharge Recommendations:  rehabilitation facility   Discharge Equipment Recommendations: hospital bed, tub bench, wheelchair, manual   Barriers to discharge: Inaccessible home, Decreased caregiver support and patient below functional baseline    Assessment:     Shahid Lackey is a 33 y.o. male admitted with a medical diagnosis of Nontraumatic cortical hemorrhage of cerebral hemisphere.  He presents with the following impairments/functional limitations:  weakness, impaired endurance, impaired sensation, impaired self care skills, impaired functional mobilty, gait instability, impaired balance, visual deficits, decreased coordination, decreased upper extremity function, impaired cognition, decreased lower extremity function, decreased safety awareness, pain, abnormal tone, decreased ROM, impaired fine motor, impaired coordination, impaired cardiopulmonary response to activity, impaired joint extensibility. The patient has improvement in arousal today, initially he is making jokes throughout session, then began making sexually inappropriate remarks. With time and distraction, able to redirect patient to participate in therapy session. He demonstrates R cervical rotation with hemianopia and inability to visually track to midline, L hemiparesis with increased hamstring/quad/calf tone MAS 2. He requires total assistance for rolling and supine to sit. He complained of back pain in sitting necessitating return to supine.  Based on their current level of function and prior level of independence, they would benefit from inpatient rehab to maximize their functional potential.      Rehab Prognosis: Good; patient would benefit from acute skilled PT services to address these deficits and reach maximum level of function.    Recent Surgery: * No surgery found *      Plan:     During this hospitalization, patient to be seen 4  "x/week to address the identified rehab impairments via therapeutic activities, therapeutic exercises, neuromuscular re-education, gait training and progress toward the following goals:    · Plan of Care Expires:  07/20/19    Subjective     Chief Complaint: "I'm sorry about the smell" during pericare; to therapist following questions about plan of care "I don't appreciate you making sexual comments to me, I'm going to christophe"  Patient/Family Comments/goals: patient initially unwilling to participate, but redirectable  Pain/Comfort:  · Pain Rating 1: (rectal pain with pericare, back pain in sitting, unable to rate)  · Pain Addressed 1: Reposition, Cessation of Activity, Distraction, Nurse notified  · Pain Rating Post-Intervention 1: 0/10  · Pain Rating 2: (headache, did not rate)  · Location - Orientation 2: generalized  · Location 2: head  · Pain Addressed 2: Distraction, Cessation of Activity, Nurse notified  · Pain Rating Post-Intervention 2: (remained)      Objective:     Communicated with RN prior to session.  Patient found supine with arterial line, bed alarm, blood pressure cuff, Condom Catheter, pulse ox (continuous), telemetry, peripheral IV, SCD, pressure relief boots upon PT entry to room.     General Precautions: Standard, aspiration, fall, dental soft   Orthopedic Precautions:N/A   Braces: N/A     Functional Mobility:    Bed Mobility  Rolling to R and L: total assistance for pericare  Supine <> Sit:  total assistance  Scoot to edge of bed: maximum assistance  Scoot to head of bed: total assistance x2     Sitting edge of bed 5 minutes, maximum assistance, weight shift posteriorl  -Posterior weight shift, R cervical rotation and flexion, kyphosis, upper extremities in flexed posture  -Visual/verbal/manual cues for midline orientation, thoracic and cervical extension  -Hand over hand placement of bilateral hands in weightbearing  -Cues for visual tracking to midline  -Performed gentle pec stretch, " "thoracic/lumbar rotation stretches, 30" x2 ea  -Patient c/o increased low back pain and stiffness, returned to supine          AM-PAC 6 CLICK MOBILITY  Turning over in bed (including adjusting bedclothes, sheets and blankets)?: 2  Sitting down on and standing up from a chair with arms (e.g., wheelchair, bedside commode, etc.): 1  Moving from lying on back to sitting on the side of the bed?: 2  Moving to and from a bed to a chair (including a wheelchair)?: 1  Need to walk in hospital room?: 1  Climbing 3-5 steps with a railing?: 1  Basic Mobility Total Score: 8       Therapeutic Activities and Exercises:   Patient educated on role of therapy, goals of session, benefits of out of bed mobility. Patient agreeable to mobilize with therapy.    AAROM: cervical rotation, cervical sidebend, cervical extension, hip/knee flex/ext, hip internal/external rotation, ankle DF+eversion/PF; 10 reps ea with sustained hold at end ROM. Calf/hamstring stretch to L 30" x2, increased calf tone, ankle DF an inverted. Positioned patient in midline, head supported in neutral flexion and near neutral rotation, heels floated, L upper extremity elevated and abducted.     Patient left HOB elevated with all lines intact, call button in reach, bed alarm on, restraints reapplied at end of session, RN notified and SLP and sitter present..    GOALS:   Multidisciplinary Problems     Physical Therapy Goals        Problem: Physical Therapy Goal    Goal Priority Disciplines Outcome Goal Variances Interventions   Physical Therapy Goal     PT, PT/OT Ongoing (interventions implemented as appropriate)     Description:  Goals to be met by: 2019     Patient will increase functional independence with mobility by performin. Supine to sit with Moderate Assistance  2. Sit to supine with Moderate Assistance  3. Sit to stand transfer with maximum assistance   4. Gait  x 5 feet with Maximum Assistance using least restrictive device   5. Sitting at edge of " bed x10 minutes with Minimal Assistance  6. Lower extremity exercise program x20 reps per handout, with assistance as needed to improve strength and ROM and increase activity tolerance.                       Time Tracking:     PT Received On: 06/21/19  PT Start Time: 1350     PT Stop Time: 1417  PT Total Time (min): 27 min     Billable Minutes: Therapeutic Activity 27    Treatment Type: Treatment  PT/PTA: PT     PTA Visit Number: 0     Aura Cameron, PT  06/21/2019

## 2019-06-21 NOTE — SUBJECTIVE & OBJECTIVE
Neurologic Chief Complaint: Right ICH    Subjective:     Interval History: Patient is seen for follow-up neurological assessment and treatment recommendations:     Patient continues on 2% and Cardene gtt. Following simple commands.     HPI, Past Medical, Family, and Social History remains the same as documented in the initial encounter.     Review of Systems   Constitutional: Negative for fever.   Eyes: Positive for visual disturbance.   Gastrointestinal: Negative for vomiting.   Neurological: Positive for speech difficulty and weakness.     Scheduled Meds:   amLODIPine  5 mg Oral Daily    folic acid  1 mg Oral Daily    levetiracetam IVPB  500 mg Intravenous Q12H    lidocaine-EPINEPHrine 1%-1:100,000  10 mL Intradermal Once    senna-docusate 8.6-50 mg  1 tablet Oral BID    thiamine  100 mg Oral Daily     Continuous Infusions:   Sodium Chloride 2% 95 mL/hr at 06/21/19 1100     PRN Meds:calcium gluconate IVPB, calcium gluconate IVPB, calcium gluconate IVPB, hydrALAZINE, magnesium sulfate IVPB, magnesium sulfate IVPB, midazolam, ondansetron, potassium chloride in water **AND** potassium chloride in water **AND** potassium chloride in water, buffered 2% sodium acetate 86meq, sodium chloride 86meq, sterile water for inj IV soln, sodium chloride 0.9%, sodium chloride 0.9%, sodium phosphate IVPB, sodium phosphate IVPB, sodium phosphate IVPB    Objective:     Vital Signs (Most Recent):  Temp: 100 °F (37.8 °C) (06/21/19 0730)  Pulse: 78 (06/21/19 1000)  Resp: (!) 31 (06/21/19 1000)  BP: (!) 151/84 (06/21/19 1000)  SpO2: 99 % (06/21/19 1000)  BP Location: Left arm    Vital Signs Range (Last 24H):  Temp:  [98.7 °F (37.1 °C)-100 °F (37.8 °C)]   Pulse:  []   Resp:  [14-63]   BP: (115-153)/(57-98)   SpO2:  [95 %-100 %]   Arterial Line BP: (124-151)/(61-75)   BP Location: Left arm    Physical Exam   Constitutional:   Underweight; multiple small wounds over body   Cardiovascular: Normal rate.   Pulmonary/Chest:    Tachypnic   Skin: Skin is warm. Rash noted.       Neurological Exam:   LOC: alert  Attention Span: poor  Language: No aphasia  Articulation: Dysarthria  Orientation: Not oriented to place, Not oriented to time  Visual Fields: Inconsistent blink to threat on the left  EOM (CN III, IV, VI): Gaze preference  right  Pupils (CN II, III): PERRL  Facial Sensation (CN V): Normal  Facial Movement (CN VII): Lower facial weakness on the Left  Motor: Spontaneous LUE 2/5, RUE 5/5, RLE 5/5; withdraws LLE 0/5  Cebellar: No evidence of appendicular or axial ataxia  Sensation: Decreased sensation on   Tone: Increased tone LUE    Laboratory:  CMP:   Recent Labs   Lab 06/21/19 0253 06/21/19 0631   CALCIUM 8.8  --    ALBUMIN 3.3*  --    PROT 6.0  --    * 150*   K 3.4*  --    CO2 22*  --    *  --    BUN 12  --    CREATININE 0.7  --    ALKPHOS 54*  --    ALT 74*  --    AST 72*  --    BILITOT 0.9  --      BMP:   Recent Labs   Lab 06/21/19 0253 06/21/19 0631   * 150*   K 3.4*  --    *  --    CO2 22*  --    BUN 12  --    CREATININE 0.7  --    CALCIUM 8.8  --      CBC:   Recent Labs   Lab 06/21/19 0253   WBC 14.30*   RBC 4.24*   HGB 12.9*   HCT 38.8*      MCV 92   MCH 30.4   MCHC 33.2     Lipid Panel:   Recent Labs   Lab 06/19/19  1807   CHOL 144   LDLCALC 80.4   HDL 31*   TRIG 163*     Coagulation:   Recent Labs   Lab 06/20/19  0457   INR 1.1   APTT 28.4     Hgb A1C:   Recent Labs   Lab 06/19/19 1807   HGBA1C 5.2     TSH:   Recent Labs   Lab 06/19/19  1807   TSH 1.015       Diagnostic Results     Brain imaging:  MRI 6/20/2019  Large parenchymal hemorrhage centered within the bilateral parietal lobes extending across midline through the splenium of the corpus callosum corresponding to abnormality seen on CT.  There is thin subdural hemorrhage overlying the right cerebral hemisphere with questionable trace hemorrhage also overlying the left inferior frontal lobe.  Mass effect with approximate 7 mm of  leftward midline shift with distortion of the ventricles similar to prior.  Trace prominence of the temporal horns lateral ventricles cannot exclude component of trapped ventricles and early hydrocephalus with remaining ventricular system relatively the normal in caliber.    CT head w/o contrast 6-19-19 results:  1. The study is significantly abnormal.  There is acute intracranial hemorrhage with large parenchymal hemorrhage in the posteromedial right occipital parietal lobe crossing over the splenium of the corpus callosum into the posteromedial left occipital parietal lobe.  There is moderate-to-marked associated edema.  There is approximately 6 mm right to left midline shift.  Subdural blood is also seen along the falx and extending inferiorly along the right tentorium.  The cerebellar tonsils are normal position.  There is compression and depression of the lateral ventricles with moderate to marked dilatation of the temporal horns.     Vessel Imaging:    Angiogram 6/21/2019  Preliminary interpretation: No evidence of aneurysm or AVM. Diffuse irregularity of multiple small and medium sized arteries with multiple areas of narrowing/beading raising question of CNS vasculitis or RCVS,. Please see Imaging report for full details.    MRA/MRV 6/20/2019  MRA head: Unremarkable motion limited MRA of the head specifically without definite abnormal flow related enhancement associated with the region of parenchymal hemorrhage partially included in the study to suggest definite associated vascular malformation.  Correlation and follow up repeat imaging when patient better able to tolerate scanning advised.  Thin subdural hemorrhage overlies the right cerebral hemisphere.  MRV: Motion limited examination as detailed above with dominant right transverse and sigmoid sinuses.  The left sigmoid sinus is not seen and likely obscured by motion.    CTA Head and neck 6-19-19 results:    Acute intraparenchymal hematoma centered within  right and left parietal lobes with extension across the corpus callosum.  Volume of hemorrhage is stable to slightly progressed in comparison to prior exam.  Slightly progressed edema and mass effect resulting in 8 cm leftward midline shift.  No new hemorrhage.    Stable dilatation of the temporal horns of lateral ventricles consistent mild hydrocephalus.    Small volume subdural hemorrhage layering along the falx and tentorium, unchanged    No evidence of large intracranial aneurysm or AVM.  Please note evaluation is limited secondary to extensive hemorrhage and mass effect.  Consider cerebral angiography for further evaluation, as clinically indicated.     Cardiac Evaluation:   EKG 6-19-19 results:  Normal sinus rhythm  Biatrial enlargement  Prolonged QT  Abnormal ECG  When compared with ECG of 16-JUN-2019 12:31,  Vent. rate has increased BY 34 BPM  Non-specific change in ST segment in Inferior leads  QT has lengthened

## 2019-06-21 NOTE — SUBJECTIVE & OBJECTIVE
Interval History 6/21/2019:  Patient is seen for follow-up rehab evaluation and recommendations: OT evaluatio pending. Angiogram performed yesterday.    HPI, Past Medical, Family, and Social History remains the same as documented in the initial encounter.    Scheduled Medications:    amLODIPine  5 mg Oral Daily    folic acid  1 mg Oral Daily    levetiracetam IVPB  500 mg Intravenous Q12H    lidocaine-EPINEPHrine 1%-1:100,000  10 mL Intradermal Once    senna-docusate 8.6-50 mg  1 tablet Oral BID    thiamine  100 mg Oral Daily       Diagnostic Results: Labs: Reviewed    PRN Medications: calcium gluconate IVPB, calcium gluconate IVPB, calcium gluconate IVPB, hydrALAZINE, magnesium sulfate IVPB, magnesium sulfate IVPB, midazolam, ondansetron, potassium chloride in water **AND** potassium chloride in water **AND** potassium chloride in water, buffered 2% sodium acetate 86meq, sodium chloride 86meq, sterile water for inj IV soln, sodium chloride 0.9%, sodium chloride 0.9%, sodium phosphate IVPB, sodium phosphate IVPB, sodium phosphate IVPB    Review of Systems   Reason unable to perform ROS: AMS.     Objective:     Vital Signs (Most Recent):  Temp: 100 °F (37.8 °C) (06/21/19 0730)  Pulse: 86 (06/21/19 0900)  Resp: (!) 34 (06/21/19 0900)  BP: (!) 147/73 (06/21/19 0900)  SpO2: 99 % (06/21/19 0900)    Vital Signs (24h Range):  Temp:  [98.7 °F (37.1 °C)-100 °F (37.8 °C)] 100 °F (37.8 °C)  Pulse:  [] 86  Resp:  [14-63] 34  SpO2:  [95 %-100 %] 99 %  BP: (115-153)/(57-98) 147/73  Arterial Line BP: (124-151)/(61-75) 141/65     Physical Exam   Constitutional: He appears well-developed and well-nourished.   HENT:   Head: Normocephalic and atraumatic.   Eyes: Right eye exhibits no discharge. Left eye exhibits no discharge.   Neck: Neck supple.   Cardiovascular: Normal rate and intact distal pulses.   Pulmonary/Chest: Effort normal. No respiratory distress.   Abdominal: Soft. He exhibits no distension.   Musculoskeletal:  He exhibits no edema or deformity.   L sided weakness      Neurological: He is alert. He is disoriented.   Facial asymmetry   L hemianopsia    Skin: Skin is warm and dry.   Abrasions    Psychiatric: His affect is inappropriate. Cognition and memory are impaired.

## 2019-06-21 NOTE — PROGRESS NOTES
Per Dr. Uribe, neuro checks Q2 hours due to increased agitation.  Also per MD turn off 2% and recheck Na at scheduled check at 6p.  If Na < 145, restart 2% at 45 ml/hr.  If Na >145 leave 2% off. Na goal is 140-145 per MD.  Will continue to monitor.

## 2019-06-21 NOTE — PLAN OF CARE
Problem: Physical Therapy Goal  Goal: Physical Therapy Goal  Goals to be met by: 2019     Patient will increase functional independence with mobility by performin. Supine to sit with Moderate Assistance  2. Sit to supine with Moderate Assistance  3. Sit to stand transfer with maximum assistance   4. Gait  x 5 feet with Maximum Assistance using least restrictive device   5. Sitting at edge of bed x10 minutes with Minimal Assistance  6. Lower extremity exercise program x20 reps per handout, with assistance as needed to improve strength and ROM and increase activity tolerance.      Outcome: Ongoing (interventions implemented as appropriate)  Continue with plan of care.   Aura Cameron, PT  2019

## 2019-06-21 NOTE — ASSESSMENT & PLAN NOTE
32 y/o IV drug abuser with large R ICH with IVH.  Etiology pending workup. MRV negative.  Angiogram completed w/ no signs of AVM or aneurysm but concern for vasculitis. 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     Patient remains on 2% and Cardene. Following simple commands    Antithrombotics: None ICH    Statins: None ICH    Aggressive risk factor modification: drug use, smoker     Rehab efforts: The patient has been evaluated by a stroke team provider and the therapy needs have been fully considered based off the presenting complaints and exam findings. The following therapy evaluations are needed: PT evaluate and treat, OT evaluate and treat, SLP evaluate and treat, PM&R evaluate for appropriate placement    Diagnostics ordered/pending: CT for any changes in neurological status    VTE prophylaxis: SCD;s only large ICH    BP parameters: ICH: SBP <140

## 2019-06-21 NOTE — ASSESSMENT & PLAN NOTE
33 M with polysubstance abuse presents with large right parietooccipital ICH and IVH.  ICH score of 3. Exam improving, RUE paresis.     -Admitted to [facility]cc for q 1 hr neuro checks  -SBP less than 140  -We will continue to monitor closely, call for any change in exam

## 2019-06-21 NOTE — PROGRESS NOTES
[facility]  Neurosurgery  Progress Note    Subjective:     History of Present Illness: 33 M presents for eval of large right IPH with IVH and midline shift.  He was at [facility] and was admitted for polysubstance abuse and was +for cocaine, heroin, THC, amphetamines.  Today he stopped moving his LUE and CT head showed large hemorrhage.  NSGY consulted for eval.    Post-Op Info:  * No surgery found *                Medications:  Continuous Infusions:   Sodium Chloride 2% 45 mL/hr at 06/21/19 1319     Scheduled Meds:   amLODIPine  5 mg Oral Daily    folic acid  1 mg Oral Daily    levetiracetam IVPB  500 mg Intravenous Q12H    lidocaine-EPINEPHrine 1%-1:100,000  10 mL Intradermal Once    magnesium sulfate IVPB  2 g Intravenous Q2H    senna-docusate 8.6-50 mg  1 tablet Oral BID    thiamine  100 mg Oral Daily     PRN Meds:acetaminophen, calcium gluconate IVPB, calcium gluconate IVPB, calcium gluconate IVPB, hydrALAZINE, magnesium sulfate IVPB, magnesium sulfate IVPB, midazolam, ondansetron, potassium chloride in water **AND** potassium chloride in water **AND** potassium chloride in water, buffered 2% sodium acetate 86meq, sodium chloride 86meq, sterile water for inj IV soln, sodium chloride 0.9%, sodium chloride 0.9%, sodium phosphate IVPB, sodium phosphate IVPB, sodium phosphate IVPB     Review of Systems    Objective:     Weight: 59.4 kg (131 lb)  Body mass index is 18.8 kg/m².  Vital Signs (Most Recent):  Temp: 99.8 °F (37.7 °C) (06/21/19 1115)  Pulse: 62 (06/21/19 1200)  Resp: (!) 45 (06/21/19 1200)  BP: (!) 141/72 (06/21/19 1200)  SpO2: 99 % (06/21/19 1200) Vital Signs (24h Range):  Temp:  [98.7 °F (37.1 °C)-100 °F (37.8 °C)] 99.8 °F (37.7 °C)  Pulse:  [] 62  Resp:  [14-63] 45  SpO2:  [95 %-100 %] 99 %  BP: (115-153)/(57-98) 141/72  Arterial Line BP: (124-151)/(61-75) 139/66     Date 06/21/19 0700 - 06/22/19 0659   Shift 8112-9296 4933-7811 8546-3588 24 Hour Total  "  INTAKE   I.V.(mL/kg) 611.2(10.3)   611.2(10.3)   IV Piggyback 100   100   Shift Total(mL/kg) 711.2(12)   711.2(12)   OUTPUT   Urine(mL/kg/hr) 325   325   Shift Total(mL/kg) 325(5.5)   325(5.5)   Weight (kg) 59.4 59.4 59.4 59.4                        Urethral Catheter 06/15/19 2005 Latex 16 Fr. (Active)   Site Assessment Clean;Intact 6/20/2019  4:05 AM   Collection Container Urimeter 6/20/2019  4:05 AM   Securement Method secured to top of thigh w/ adhesive device 6/20/2019  4:05 AM   Catheter Care Performed yes 6/20/2019  4:05 AM   Reason for Continuing Urinary Catheterization Critically ill in ICU requiring intensive monitoring 6/20/2019  4:05 AM   CAUTI Prevention Bundle StatLock in place w 1" slack;Intact seal between catheter & drainage tubing;Drainage bag off the floor;Green sheeting clip in use;No dependent loops or kinks;Drainage bag not overfilled (<2/3 full);Drainage bag below bladder 6/20/2019  4:05 AM   Output (mL) 200 mL 6/20/2019 10:00 AM            Urethral Catheter (Active)       Neurosurgery Physical Exam    General: laying in bed, asleep, comfortable.  Head: normocephalic, atraumatic  Neurologic:   GCS: Eyes 2; easily arousable. Verbal 4, Motor 6  Mental Status: awake, however not opening eyes unless stimulated, but able to participate in converation  Oriented to self but not place (knows is hospital) and not year.   Cranial nerves: PERRL, R sided gaze preference.   Localizes briskly in the Lower extremities.   RUE following commands, no movement of the left upper extremity.   DTR: 2+ symmetrically throughout.  Pulmonary: normal respirations, no signs of respiratory distress  Abdomen: soft, non-distended, not tender to palpation                     Significant Labs:  Recent Labs   Lab 06/19/19  1807  06/20/19  0457  06/20/19  1258  06/20/19  2355 06/21/19  0253 06/21/19  0631   GLU 97  --  102  102  --   --   --   --  117*  --       < > 138  138   < >  --    < > 147* 148* 150*   K 3.7  --  " 3.0*  3.0*  --  3.2*  --   --  3.4*  --      --  103  103  --   --   --   --  113*  --    CO2 20*  --  21*  21*  --   --   --   --  22*  --    BUN 13  --  11  11  --   --   --   --  12  --    CREATININE 0.7  --  0.7  0.7  --   --   --   --  0.7  --    CALCIUM 8.8  --  8.8  8.8  --   --   --   --  8.8  --    MG  --   --  2.2  --   --   --   --  2.2  --     < > = values in this interval not displayed.     Recent Labs   Lab 06/19/19 1807 06/20/19 0457 06/21/19 0253   WBC 15.63* 14.42* 14.30*   HGB 15.1 14.3 12.9*   HCT 43.6 41.9 38.8*    328 328     Recent Labs   Lab 06/19/19  1445 06/19/19 1807 06/20/19 0457   INR 1.1 1.1 1.1   APTT 34.2* 29.6 28.4     Microbiology Results (last 7 days)     Procedure Component Value Units Date/Time    Blood culture [[id number]]     Order Status:  Canceled Specimen:  Blood     Blood culture [[id number]]     Order Status:  Canceled Specimen:  Blood     Culture, Respiratory with Gram Stain [[id number]]     Order Status:  No result Specimen:  Respiratory         CMP:   Recent Labs   Lab 06/19/19  1807 06/20/19 0457  06/20/19  1258  06/20/19  2355 06/21/19  0253 06/21/19  0631   GLU 97  --  102  102  --   --   --   --  117*  --    CALCIUM 8.8  --  8.8  8.8  --   --   --   --  8.8  --    ALBUMIN 3.8  --  3.6  3.6  --   --   --   --  3.3*  --    PROT 6.8  --  6.7  6.7  --   --   --   --  6.0  --       < > 138  138   < >  --    < > 147* 148* 150*   K 3.7  --  3.0*  3.0*  --  3.2*  --   --  3.4*  --    CO2 20*  --  21*  21*  --   --   --   --  22*  --      --  103  103  --   --   --   --  113*  --    BUN 13  --  11  11  --   --   --   --  12  --    CREATININE 0.7  --  0.7  0.7  --   --   --   --  0.7  --    ALKPHOS 62  --  62  62  --   --   --   --  54*  --    *  --  89*  89*  --   --   --   --  74*  --    *  --  92*  92*  --   --   --   --  72*  --    BILITOT 1.1*  --  1.3*  1.3*  --   --   --   --  0.9  --     < > = values in  this interval not displayed.     CRP: No results for input(s): CRP in the last 48 hours.  ESR: No results for input(s): POCESR, ERYTHROCYTES in the last 48 hours.  LFTs:   Recent Labs   Lab 06/19/19  1807 06/20/19  0457 06/21/19  0253   * 89*  89* 74*   * 92*  92* 72*   ALKPHOS 62 62  62 54*   BILITOT 1.1* 1.3*  1.3* 0.9   PROT 6.8 6.7  6.7 6.0   ALBUMIN 3.8 3.6  3.6 3.3*       Significant Diagnostics:  CT: No results found in the last 24 hours.  Echoencephalography: No results found in the last 24 hours.  MRI: No results found in the last 24 hours.    Assessment/Plan:     * Nontraumatic cortical hemorrhage of cerebral hemisphere  33 M with polysubstance abuse presents with large right parietooccipital ICH and IVH.  ICH score of 3. Exam improving, RUE paresis.     -Admitted to [facility] for q 1 hr neuro checks  -SBP less than 140  -We will continue to monitor closely, call for any change in exam        Evan Bob MD  Neurosurgery  [facility]

## 2019-06-21 NOTE — PLAN OF CARE
Problem: Occupational Therapy Goal  Goal: Occupational Therapy Goal  OT evaluation completed.  LISA Barrios  6/21/2019

## 2019-06-21 NOTE — PT/OT/SLP EVAL
"Occupational Therapy   Evaluation    Name: Shahid Lackey  MRN: [id number]  Admitting Diagnosis:  Nontraumatic cortical hemorrhage of cerebral hemisphere      Recommendations:     Discharge Recommendations: rehabilitation facility  Discharge Equipment Recommendations:  tub bench  Barriers to discharge:  Inaccessible home environment, Decreased caregiver support    Assessment:     Shahid Lackey is a 33 y.o. male with a medical diagnosis of Nontraumatic cortical hemorrhage of cerebral hemisphere.  He presents with performance deficits affecting function: weakness, impaired self care skills, impaired balance, decreased coordination, decreased safety awareness, decreased ROM, impaired endurance, impaired functional mobilty, decreased upper extremity function, impaired sensation, gait instability, impaired cognition, visual deficits, decreased lower extremity function, abnormal tone, impaired fine motor, impaired coordination.      Rehab Prognosis: Good; patient would benefit from acute skilled OT services to address these deficits and reach maximum level of function.       Plan:     Patient to be seen 4 x/week to address the above listed problems via self-care/home management, neuromuscular re-education, cognitive retraining, therapeutic activities, therapeutic exercises, sensory integration  · Plan of Care Expires: 07/19/19  · Plan of Care Reviewed with: patient    Subjective     Patient:  "I graduated with my GED."  Patient making jokes throughout the session.    Occupational Profile:  Per patient (need to confirm with family):  Patient resides in [place] with mother in one story home with no steps to enter.  PTA patient independent with ADLs, not driving.  Currently owns no DME.  Patient is right handed.  Works at [employer].  Hobbies: going to the park.      Pain/Comfort:  · Pain Rating 1: 5/10  · Location 1: back  · Pain Addressed 1: Reposition  · Pain Rating Post-Intervention 1: 0/10    Patients cultural, " spiritual, Worship conflicts given the current situation: no    Objective:     Communicated with:  Nurse prior to session.  Patient found supine with arterial line, bed alarm, blood pressure cuff, peripheral IV, telemetry, pulse ox (continuous), SCD, restraints upon OT entry to room.    General Precautions: Standard, aspiration, fall   Orthopedic Precautions:N/A   Braces: N/A     Occupational Performance:    Bed Mobility:    · Patient completed Rolling/Turning to Right with maximal assistance  · Patient completed Supine to Sit with total assistance  · Patient completed Sit to Supine with total assistance    Functional Mobility/Transfers:  · Max assist with scooting along the EOB.    Activities of Daily Living:  · Grooming: total assistance while seated EOB  · Upper Body Dressing: total assistance while seated EOB  · Lower Body Dressing: total assistance while seated EOB    Cognitive/Visual Perceptual:  Cognitive/Psychosocial Skills:     -       Oriented to: Person and Place   -       Follows Commands/attention:Follows one-step commands  -       Safety awareness/insight to disability: impaired   -       Mood/Affect/Coping skills/emotional control: distracted, poor attention  Vision: left hemianopia    Physical Exam:  Postural examination/scapula alignment:    -       Rounded shoulders  Skin integrity: Bruising of bilateral UEs; skin abrasions  Edema:  None noted  Sensation:    -       Impaired  light/touch and proprioception  (L)  Upper Extremity Range of Motion:  AAROM WNL  Upper Extremity Strength:R UE:  WNL; L UE 2/5    AMPAC 6 Click ADL:  AMPAC Total Score: 6    Treatment & Education:  Patient education provided on role of OT and need for rehab upon discharge.  Patient education provided on hemiplegic dressing technique, left UE weight bearing / positioning, postural control, and transfers.   Continued education, patient/ family training recommended.  Patient alert and oriented x person and place.  Able to  follow 4/4 one step commands.  Patient inattentive and interactive throughout the session.  Patient able to identify 2/2.  Able to sequence 7/7 days of the week and 12/12 months of the year. Delayed responses.  Left neglect.  Daily orientation provided.  AAROM performed bilateral UE/LEs one set x 10 rep in all planes of motion with stretches provided at end range; sustained stretch provided for left UE external rotation and shoulder flexion; assistance and facilitation provided for upward rotation of the scapula during shoulder flexion and abduction.  Patient able to follow simple one step commands.  Positioning provided for midline orientation with bilateral UEs elevated and heels lifted off mattress.  Gentle cervical rotation provided to the left.   Family not present during the session.  Patient's functional status and disposition recommendation discussed with patient and nurse.  White board updated in patient's room.  OT asked if there were any other questions; patient/ family had no further questions.   Education:    Patient left supine with all lines intact, call button in reach and bed alarm on    GOALS:   Multidisciplinary Problems     Occupational Therapy Goals        Problem: Occupational Therapy Goal    Goal Priority Disciplines Outcome Interventions   Occupational Therapy Goal     OT, PT/OT     Description:  Goals set 6/21 to be addressed for 14 days with expiration date, 7/5:  Patient will increase functional independence with ADLs by performing:    Patient will demonstrate rolling to the right with min assist.  Not met   Patient will demonstrate rolling to the left with min assist.   Not met  Patient will demonstrate supine -sit with mod assist.   Not met  Patient will demonstrate stand pivot transfers with mod assist.   Not met  Patient will demonstrate grooming while seated with min assist.   Not met  Patient will demonstrate upper body dressing with mod assist while seated EOB.   Not met  Patient  will demonstrate lower body dressing with mod assist while seated EOB.   Not met  Patient will demonstrate toileting with mod assist.   Not met  Patient will demonstrate bathing while seated EOB with mod assist.   Not met  Patient's family / caregiver will demonstrate independence and safety with assisting patient with self-care skills and functional mobility.     Not met  Patient's family / caregiver will demonstrate independence with providing ROM and changes in bed positioning.   Not met                        History:     Past Medical History:   Diagnosis Date    Current smoker     Drug abuse, IV        Past Surgical History:   Procedure Laterality Date    INSERT ARTERIAL LINE  6/20/2019            Time Tracking:     OT Date of Treatment: 06/21/19  OT Start Time: 0608  OT Stop Time: 0647  OT Total Time (min): 39 min    Billable Minutes:Evaluation 16  Therapeutic Activity 10  Cognitive Retraining 13    LISA Barrios  6/21/2019

## 2019-06-21 NOTE — PLAN OF CARE
Problem: Adult Inpatient Plan of Care  Goal: Plan of Care Review  POC reviewed with pt at 2300. Pt post angio @ 1900 6/20. Ziyad discontinued. 2% continued and adjusted to 95 mL/h to obtain goal Na (145-155). Pt progressing toward goals. Will continue to monitor. See flowsheets for full assessment and VS info.

## 2019-06-22 NOTE — SUBJECTIVE & OBJECTIVE
Neurologic Chief Complaint: Right ICH    Subjective:     Interval History: Patient is seen for follow-up neurological assessment and treatment recommendations: On 2% gtt. Off cardene gtt    HPI, Past Medical, Family, and Social History remains the same as documented in the initial encounter.     Review of Systems   Constitutional: Negative for fever.   Eyes: Positive for visual disturbance.   Gastrointestinal: Negative for vomiting.   Neurological: Positive for speech difficulty and weakness.     Scheduled Meds:   amLODIPine  5 mg Oral Daily    folic acid  1 mg Oral Daily    levetiracetam oral soln  500 mg Oral BID    lidocaine-EPINEPHrine 1%-1:100,000  10 mL Intradermal Once    senna-docusate 8.6-50 mg  1 tablet Oral BID    thiamine  100 mg Oral Daily     Continuous Infusions:    PRN Meds:acetaminophen, calcium gluconate IVPB, calcium gluconate IVPB, calcium gluconate IVPB, hydrALAZINE, magnesium sulfate IVPB, magnesium sulfate IVPB, midazolam, ondansetron, potassium chloride in water **AND** potassium chloride in water **AND** potassium chloride in water, buffered 2% sodium acetate 86meq, sodium chloride 86meq, sterile water for inj IV soln, sodium chloride 0.9%, sodium chloride 0.9%, sodium phosphate IVPB, sodium phosphate IVPB, sodium phosphate IVPB    Objective:     Vital Signs (Most Recent):  Temp: 99.2 °F (37.3 °C) (06/22/19 1130)  Pulse: (!) 57 (06/22/19 1300)  Resp: (!) 26 (06/22/19 1300)  BP: 131/71 (06/22/19 1300)  SpO2: 100 % (06/22/19 1300)  BP Location: Left arm    Vital Signs Range (Last 24H):  Temp:  [98.9 °F (37.2 °C)-100.5 °F (38.1 °C)]   Pulse:  [54-75]   Resp:  [24-44]   BP: (112-164)/()   SpO2:  [97 %-100 %]   Arterial Line BP: (117-155)/(58-96)   BP Location: Left arm    Physical Exam   Constitutional:   Underweight; multiple small wounds over body   Cardiovascular: Normal rate.   Pulmonary/Chest:   Tachypnic   Skin: Skin is warm. Rash noted.   Nursing note and vitals  reviewed.      Neurological Exam:   LOC: alert  Attention Span: poor  Language: No aphasia  Articulation: Dysarthria  Orientation: Not oriented to place, Not oriented to time  Visual Fields: Inconsistent blink to threat on the left  EOM (CN III, IV, VI): Gaze preference  right  Pupils (CN II, III): PERRL  Facial Sensation (CN V): Normal  Facial Movement (CN VII): Lower facial weakness on the Left  Motor: Spontaneous LUE 2/5, RUE 5/5, RLE 5/5; withdraws LLE 0/5  Cebellar: No evidence of appendicular or axial ataxia  Sensation: Decreased sensation on L  Tone: Increased tone LUE    Laboratory:  CMP:   Recent Labs   Lab 06/22/19  0155 06/22/19  1158   CALCIUM 9.0  --   --    ALBUMIN 3.6  --   --    PROT 6.6  --   --       < > 144   K 3.8   < > 3.7   CO2 24  --   --    *  --   --    BUN 11  --   --    CREATININE 0.7  --   --    ALKPHOS 54*  --   --    ALT 64*  --   --    AST 53*  --   --    BILITOT 1.0  --   --     < > = values in this interval not displayed.     BMP:   Recent Labs   Lab 06/22/19  0155  06/22/19  1158      < > 144   K 3.8   < > 3.7   *  --   --    CO2 24  --   --    BUN 11  --   --    CREATININE 0.7  --   --    CALCIUM 9.0  --   --     < > = values in this interval not displayed.     CBC:   Recent Labs   Lab 06/22/19 0155   WBC 16.19*   RBC 4.36*   HGB 13.5*   HCT 40.0      MCV 92   MCH 31.0   MCHC 33.8     Lipid Panel:   Recent Labs   Lab 06/19/19  1807   CHOL 144   LDLCALC 80.4   HDL 31*   TRIG 163*     Coagulation:   Recent Labs   Lab 06/20/19  0457   INR 1.1   APTT 28.4     Hgb A1C:   Recent Labs   Lab 06/19/19 1807   HGBA1C 5.2     TSH:   Recent Labs   Lab 06/19/19 1807   TSH 1.015       Diagnostic Results     Brain imaging:  MRI 6/20/2019  Large parenchymal hemorrhage centered within the bilateral parietal lobes extending across midline through the splenium of the corpus callosum corresponding to abnormality seen on CT.  There is thin subdural hemorrhage overlying  the right cerebral hemisphere with questionable trace hemorrhage also overlying the left inferior frontal lobe.  Mass effect with approximate 7 mm of leftward midline shift with distortion of the ventricles similar to prior.  Trace prominence of the temporal horns lateral ventricles cannot exclude component of trapped ventricles and early hydrocephalus with remaining ventricular system relatively the normal in caliber.    CT head w/o contrast 6-19-19 results:  1. The study is significantly abnormal.  There is acute intracranial hemorrhage with large parenchymal hemorrhage in the posteromedial right occipital parietal lobe crossing over the splenium of the corpus callosum into the posteromedial left occipital parietal lobe.  There is moderate-to-marked associated edema.  There is approximately 6 mm right to left midline shift.  Subdural blood is also seen along the falx and extending inferiorly along the right tentorium.  The cerebellar tonsils are normal position.  There is compression and depression of the lateral ventricles with moderate to marked dilatation of the temporal horns.     Vessel Imaging:    Angiogram 6/21/2019  Preliminary interpretation: No evidence of aneurysm or AVM. Diffuse irregularity of multiple small and medium sized arteries with multiple areas of narrowing/beading raising question of CNS vasculitis or RCVS,. Please see Imaging report for full details.    MRA/MRV 6/20/2019  MRA head: Unremarkable motion limited MRA of the head specifically without definite abnormal flow related enhancement associated with the region of parenchymal hemorrhage partially included in the study to suggest definite associated vascular malformation.  Correlation and follow up repeat imaging when patient better able to tolerate scanning advised.  Thin subdural hemorrhage overlies the right cerebral hemisphere.  MRV: Motion limited examination as detailed above with dominant right transverse and sigmoid sinuses.  The  left sigmoid sinus is not seen and likely obscured by motion.    CTA Head and neck 6-19-19 results:    Acute intraparenchymal hematoma centered within right and left parietal lobes with extension across the corpus callosum.  Volume of hemorrhage is stable to slightly progressed in comparison to prior exam.  Slightly progressed edema and mass effect resulting in 8 cm leftward midline shift.  No new hemorrhage.    Stable dilatation of the temporal horns of lateral ventricles consistent mild hydrocephalus.    Small volume subdural hemorrhage layering along the falx and tentorium, unchanged    No evidence of large intracranial aneurysm or AVM.  Please note evaluation is limited secondary to extensive hemorrhage and mass effect.  Consider cerebral angiography for further evaluation, as clinically indicated.     Cardiac Evaluation:   EKG 6-19-19 results:  Normal sinus rhythm  Biatrial enlargement  Prolonged QT  Abnormal ECG  When compared with ECG of 16-JUN-2019 12:31,  Vent. rate has increased BY 34 BPM  Non-specific change in ST segment in Inferior leads  QT has lengthened

## 2019-06-22 NOTE — PROGRESS NOTES
[facility]  Vascular Neurology  Comprehensive Stroke Center  Progress Note    Assessment/Plan:     * Nontraumatic cortical hemorrhage of cerebral hemisphere  32 y/o IV drug abuser with large R ICH with IVH.  Etiology pending workup. MRV negative.  Angiogram completed w/ no signs of AVM or aneurysm but concern for vasculitis. 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     Patient remains on 2% gtt. Following simple commands    Antithrombotics: None ICH    Statins: None ICH    Aggressive risk factor modification: drug use, smoker     Rehab efforts: The patient has been evaluated by a stroke team provider and the therapy needs have been fully considered based off the presenting complaints and exam findings. The following therapy evaluations are needed: PT evaluate and treat, OT evaluate and treat, SLP evaluate and treat, PM&R evaluate for appropriate placement    Diagnostics ordered/pending: CT for any changes in neurological status, TCD to assess possible vasospasm     VTE prophylaxis: SCDs only large ICH    BP parameters: ICH: SBP <140        Brain compression  On HTS 2%  Monitoring closely      Current smoker  Risk factor  Smoking cessation when appropriate    Vasogenic cerebral edema  Area of vasogenic cerebral edema identified when reviewing brain imaging in the territory of the R middle cerebral artery. There is mass effect associated with it. We will continue to monitor the patients clinical exam for any worsening of symptoms which may indicate expansion of the stroke or the area of the edema resulting in the clinical change. The pattern is suggestive of drug induced vasculopathy etiology         Left hemiparesis  Due to ICH  Aggressive therapy when appropriate    Leukocytosis  WBC 16. Tmax 100.5  Off abx  Management per primary     Drug abuse, IV  Stroke risk factor  Counseling when appropriate         6/20:  Restless but cooperative. On 2% HTS  6/21: Patient continues on 2%  and Cardene gtt. Following simple commands.   6/22: On 2% gtt. Off cardene gtt.     STROKE DOCUMENTATION        NIH Scale:  1a. Level of Consciousness: 0-->Alert, keenly responsive  1b. LOC Questions: 1-->Answers one question correctly  1c. LOC Commands: 0-->Performs both tasks correctly  2. Best Gaze: 1-->Partial gaze palsy, gaze is abnormal in one or both eyes, but forced deviation or total gaze paresis is not present  3. Visual: 2-->Complete hemianopia  4. Facial Palsy: 1-->Minor paralysis (flattened nasolabial fold, asymmetry on smiling)  5a. Motor Arm, Left: 3-->No effort against gravity, limb falls  5b. Motor Arm, Right: 0-->No drift, limb holds 90 (or 45) degrees for full 10 secs  6a. Motor Leg, Left: 4-->No movement  6b. Motor Leg, Right: 2-->Some effort against gravity, leg falls to bed by 5 secs, but has some effort against gravity  7. Limb Ataxia: 0-->Absent  8. Sensory: 0-->Normal, no sensory loss  9. Best Language: 0-->No aphasia, normal  10. Dysarthria: 1-->Mild-to-moderate dysarthria, patient slurs at least some words and, at worst, can be understood with some difficulty  11. Extinction and Inattention (formerly Neglect): 0-->No abnormality  Total (NIH Stroke Scale): 15       Modified [place] Score: 0  Catherine Coma Scale:    ABCD2 Score:    [id number] Score:   HAS -BLED Score:   ICH Score:3  Hunt & Dougherty Classification:      Hemorrhagic change of an Ischemic Stroke: Does this patient have an ischemic stroke with hemorrhagic changes? No     Neurologic Chief Complaint: Right ICH    Subjective:     Interval History: Patient is seen for follow-up neurological assessment and treatment recommendations: On 2% gtt. Off cardene gtt    HPI, Past Medical, Family, and Social History remains the same as documented in the initial encounter.     Review of Systems   Constitutional: Negative for fever.   Eyes: Positive for visual disturbance.   Gastrointestinal: Negative for vomiting.   Neurological: Positive for  speech difficulty and weakness.     Scheduled Meds:   amLODIPine  5 mg Oral Daily    folic acid  1 mg Oral Daily    levetiracetam oral soln  500 mg Oral BID    lidocaine-EPINEPHrine 1%-1:100,000  10 mL Intradermal Once    senna-docusate 8.6-50 mg  1 tablet Oral BID    thiamine  100 mg Oral Daily     Continuous Infusions:    PRN Meds:acetaminophen, calcium gluconate IVPB, calcium gluconate IVPB, calcium gluconate IVPB, hydrALAZINE, magnesium sulfate IVPB, magnesium sulfate IVPB, midazolam, ondansetron, potassium chloride in water **AND** potassium chloride in water **AND** potassium chloride in water, buffered 2% sodium acetate 86meq, sodium chloride 86meq, sterile water for inj IV soln, sodium chloride 0.9%, sodium chloride 0.9%, sodium phosphate IVPB, sodium phosphate IVPB, sodium phosphate IVPB    Objective:     Vital Signs (Most Recent):  Temp: 99.2 °F (37.3 °C) (06/22/19 1130)  Pulse: (!) 57 (06/22/19 1300)  Resp: (!) 26 (06/22/19 1300)  BP: 131/71 (06/22/19 1300)  SpO2: 100 % (06/22/19 1300)  BP Location: Left arm    Vital Signs Range (Last 24H):  Temp:  [98.9 °F (37.2 °C)-100.5 °F (38.1 °C)]   Pulse:  [54-75]   Resp:  [24-44]   BP: (112-164)/()   SpO2:  [97 %-100 %]   Arterial Line BP: (117-155)/(58-96)   BP Location: Left arm    Physical Exam   Constitutional:   Underweight; multiple small wounds over body   Cardiovascular: Normal rate.   Pulmonary/Chest:   Tachypnic   Skin: Skin is warm. Rash noted.   Nursing note and vitals reviewed.      Neurological Exam:   LOC: alert  Attention Span: poor  Language: No aphasia  Articulation: Dysarthria  Orientation: Not oriented to place, Not oriented to time  Visual Fields: Inconsistent blink to threat on the left  EOM (CN III, IV, VI): Gaze preference  right  Pupils (CN II, III): PERRL  Facial Sensation (CN V): Normal  Facial Movement (CN VII): Lower facial weakness on the Left  Motor: Spontaneous LUE 2/5, RUE 5/5, RLE 5/5; withdraws LLE 0/5  Cebellar: No  evidence of appendicular or axial ataxia  Sensation: Decreased sensation on L  Tone: Increased tone LUE    Laboratory:  CMP:   Recent Labs   Lab 06/22/19  0155  06/22/19  1158   CALCIUM 9.0  --   --    ALBUMIN 3.6  --   --    PROT 6.6  --   --       < > 144   K 3.8   < > 3.7   CO2 24  --   --    *  --   --    BUN 11  --   --    CREATININE 0.7  --   --    ALKPHOS 54*  --   --    ALT 64*  --   --    AST 53*  --   --    BILITOT 1.0  --   --     < > = values in this interval not displayed.     BMP:   Recent Labs   Lab 06/22/19  0155  06/22/19  1158      < > 144   K 3.8   < > 3.7   *  --   --    CO2 24  --   --    BUN 11  --   --    CREATININE 0.7  --   --    CALCIUM 9.0  --   --     < > = values in this interval not displayed.     CBC:   Recent Labs   Lab 06/22/19  0155   WBC 16.19*   RBC 4.36*   HGB 13.5*   HCT 40.0      MCV 92   MCH 31.0   MCHC 33.8     Lipid Panel:   Recent Labs   Lab 06/19/19  1807   CHOL 144   LDLCALC 80.4   HDL 31*   TRIG 163*     Coagulation:   Recent Labs   Lab 06/20/19  0457   INR 1.1   APTT 28.4     Hgb A1C:   Recent Labs   Lab 06/19/19  1807   HGBA1C 5.2     TSH:   Recent Labs   Lab 06/19/19  1807   TSH 1.015       Diagnostic Results     Brain imaging:  MRI 6/20/2019  Large parenchymal hemorrhage centered within the bilateral parietal lobes extending across midline through the splenium of the corpus callosum corresponding to abnormality seen on CT.  There is thin subdural hemorrhage overlying the right cerebral hemisphere with questionable trace hemorrhage also overlying the left inferior frontal lobe.  Mass effect with approximate 7 mm of leftward midline shift with distortion of the ventricles similar to prior.  Trace prominence of the temporal horns lateral ventricles cannot exclude component of trapped ventricles and early hydrocephalus with remaining ventricular system relatively the normal in caliber.    CT head w/o contrast 6-19-19 results:  1. The study  is significantly abnormal.  There is acute intracranial hemorrhage with large parenchymal hemorrhage in the posteromedial right occipital parietal lobe crossing over the splenium of the corpus callosum into the posteromedial left occipital parietal lobe.  There is moderate-to-marked associated edema.  There is approximately 6 mm right to left midline shift.  Subdural blood is also seen along the falx and extending inferiorly along the right tentorium.  The cerebellar tonsils are normal position.  There is compression and depression of the lateral ventricles with moderate to marked dilatation of the temporal horns.     Vessel Imaging:    Angiogram 6/21/2019  Preliminary interpretation: No evidence of aneurysm or AVM. Diffuse irregularity of multiple small and medium sized arteries with multiple areas of narrowing/beading raising question of CNS vasculitis or RCVS,. Please see Imaging report for full details.    MRA/MRV 6/20/2019  MRA head: Unremarkable motion limited MRA of the head specifically without definite abnormal flow related enhancement associated with the region of parenchymal hemorrhage partially included in the study to suggest definite associated vascular malformation.  Correlation and follow up repeat imaging when patient better able to tolerate scanning advised.  Thin subdural hemorrhage overlies the right cerebral hemisphere.  MRV: Motion limited examination as detailed above with dominant right transverse and sigmoid sinuses.  The left sigmoid sinus is not seen and likely obscured by motion.    CTA Head and neck 6-19-19 results:    Acute intraparenchymal hematoma centered within right and left parietal lobes with extension across the corpus callosum.  Volume of hemorrhage is stable to slightly progressed in comparison to prior exam.  Slightly progressed edema and mass effect resulting in 8 cm leftward midline shift.  No new hemorrhage.    Stable dilatation of the temporal horns of lateral ventricles  consistent mild hydrocephalus.    Small volume subdural hemorrhage layering along the falx and tentorium, unchanged    No evidence of large intracranial aneurysm or AVM.  Please note evaluation is limited secondary to extensive hemorrhage and mass effect.  Consider cerebral angiography for further evaluation, as clinically indicated.     Cardiac Evaluation:   EKG 6-19-19 results:  Normal sinus rhythm  Biatrial enlargement  Prolonged QT  Abnormal ECG  When compared with ECG of 16-JUN-2019 12:31,  Vent. rate has increased BY 34 BPM  Non-specific change in ST segment in Inferior leads  QT has lengthened      Gerald Weber NP  [facility]  Department of Vascular Neurology   [facility]

## 2019-06-22 NOTE — PROGRESS NOTES
Dr. Miller at bedside, sodium goal to still remain 140-145, notify for 137 or less, continue q 6hr checks and treat temp> 38.5 with tylletitia, RODNEY Green aware and to enter orders.

## 2019-06-22 NOTE — SUBJECTIVE & OBJECTIVE
Medications:  Continuous Infusions:    Scheduled Meds:   amLODIPine  5 mg Oral Daily    folic acid  1 mg Oral Daily    levetiracetam IVPB  500 mg Intravenous Q12H    lidocaine-EPINEPHrine 1%-1:100,000  10 mL Intradermal Once    senna-docusate 8.6-50 mg  1 tablet Oral BID    thiamine  100 mg Oral Daily     PRN Meds:acetaminophen, calcium gluconate IVPB, calcium gluconate IVPB, calcium gluconate IVPB, hydrALAZINE, magnesium sulfate IVPB, magnesium sulfate IVPB, midazolam, ondansetron, potassium chloride in water **AND** potassium chloride in water **AND** potassium chloride in water, buffered 2% sodium acetate 86meq, sodium chloride 86meq, sterile water for inj IV soln, sodium chloride 0.9%, sodium chloride 0.9%, sodium phosphate IVPB, sodium phosphate IVPB, sodium phosphate IVPB     Review of Systems    Objective:     Weight: 59.4 kg (131 lb)  Body mass index is 18.8 kg/m².  Vital Signs (Most Recent):  Temp: 99.2 °F (37.3 °C) (06/22/19 1130)  Pulse: (!) 57 (06/22/19 1300)  Resp: (!) 26 (06/22/19 1300)  BP: 131/71 (06/22/19 1300)  SpO2: 100 % (06/22/19 1300) Vital Signs (24h Range):  Temp:  [98.9 °F (37.2 °C)-100.5 °F (38.1 °C)] 99.2 °F (37.3 °C)  Pulse:  [54-75] 57  Resp:  [24-44] 26  SpO2:  [97 %-100 %] 100 %  BP: (112-164)/() 131/71  Arterial Line BP: (117-155)/(58-96) 129/64     Date 06/22/19 0700 - 06/23/19 0659   Shift 1860-1044 9499-4081 9211-6475 24 Hour Total   INTAKE   P.O. 120   120   I.V.(mL/kg) 48.3(0.8)   48.3(0.8)   IV Piggyback 100   100   Shift Total(mL/kg) 268.3(4.5)   268.3(4.5)   OUTPUT   Urine(mL/kg/hr) 350   350   Shift Total(mL/kg) 350(5.9)   350(5.9)   Weight (kg) 59.4 59.4 59.4 59.4                        Urethral Catheter 06/15/19 2005 Latex 16 Fr. (Active)   Site Assessment Clean;Intact 6/20/2019  4:05 AM   Collection Container Urimeter 6/20/2019  4:05 AM   Securement Method secured to top of thigh w/ adhesive device 6/20/2019  4:05 AM   Catheter Care Performed yes  "6/20/2019  4:05 AM   Reason for Continuing Urinary Catheterization Critically ill in ICU requiring intensive monitoring 6/20/2019  4:05 AM   CAUTI Prevention Bundle StatLock in place w 1" slack;Intact seal between catheter & drainage tubing;Drainage bag off the floor;Green sheeting clip in use;No dependent loops or kinks;Drainage bag not overfilled (<2/3 full);Drainage bag below bladder 6/20/2019  4:05 AM   Output (mL) 200 mL 6/20/2019 10:00 AM            Urethral Catheter (Active)       Neurosurgery Physical Exam    Head: normocephalic, atraumatic  Neurologic:   GCS: Eyes 3; easily arousable. Verbal 4, Motor 6  Mental Status: awake, however not opening eyes unless stimulated, but able to participate in converation  Oriented to self but not place (knows is hospital) and not year.   Cranial nerves: PERRL, R sided gaze preference.     RUE and RLE following commands, no movement of the left upper extremity. LLE localizes  DTR: 2+ symmetrically throughout.  Pulmonary: normal respirations, no signs of respiratory distress     Significant Labs:  Recent Labs   Lab 06/21/19 0253 06/22/19 0155 06/22/19  0548 06/22/19  1158   *  --  116*  --   --    *   < > 145 143 144   K 3.4*   < > 3.8 3.6 3.7   *  --  112*  --   --    CO2 22*  --  24  --   --    BUN 12  --  11  --   --    CREATININE 0.7  --  0.7  --   --    CALCIUM 8.8  --  9.0  --   --    MG 2.2  --  2.4  --   --     < > = values in this interval not displayed.     Recent Labs   Lab 06/21/19 0253 06/22/19  0155   WBC 14.30* 16.19*   HGB 12.9* 13.5*   HCT 38.8* 40.0    334     No results for input(s): LABPT, INR, APTT in the last 48 hours.  Microbiology Results (last 7 days)     Procedure Component Value Units Date/Time    Culture, Respiratory with Gram Stain [[id number]] Collected:  06/22/19 0646    Order Status:  Completed Specimen:  Respiratory from Sputum Updated:  06/22/19 0914     Gram Stain (Respiratory) <10 epithelial cells per low " power field.     Gram Stain (Respiratory) Few WBC's     Gram Stain (Respiratory) Rare yeast    Narrative:       Mini-BAL.    Blood culture [[id number]] Collected:  06/22/19 0643    Order Status:  Sent Specimen:  Blood from Peripheral, Foot, Left Updated:  06/22/19 0717    Blood culture [[id number]] Collected:  06/22/19 0633    Order Status:  Sent Specimen:  Blood from Peripheral, Ankle, Right Updated:  06/22/19 0712    Blood culture [[id number]]     Order Status:  Canceled Specimen:  Blood     Blood culture [[id number]]     Order Status:  Canceled Specimen:  Blood     Culture, Respiratory with Gram Stain [[id number]]     Order Status:  No result Specimen:  Respiratory         CMP:   Recent Labs   Lab 06/21/19 0253 06/22/19 0155 06/22/19  0548 06/22/19  1158   *  --  116*  --   --    CALCIUM 8.8  --  9.0  --   --    ALBUMIN 3.3*  --  3.6  --   --    PROT 6.0  --  6.6  --   --    *   < > 145 143 144   K 3.4*   < > 3.8 3.6 3.7   CO2 22*  --  24  --   --    *  --  112*  --   --    BUN 12  --  11  --   --    CREATININE 0.7  --  0.7  --   --    ALKPHOS 54*  --  54*  --   --    ALT 74*  --  64*  --   --    AST 72*  --  53*  --   --    BILITOT 0.9  --  1.0  --   --     < > = values in this interval not displayed.     CRP: No results for input(s): CRP in the last 48 hours.  ESR: No results for input(s): POCESR, ERYTHROCYTES in the last 48 hours.  LFTs:   Recent Labs   Lab 06/21/19 0253 06/22/19 0155   ALT 74* 64*   AST 72* 53*   ALKPHOS 54* 54*   BILITOT 0.9 1.0   PROT 6.0 6.6   ALBUMIN 3.3* 3.6       Significant Diagnostics:  Reviewed

## 2019-06-22 NOTE — PROGRESS NOTES
[facility]  Neurosurgery  Progress Note    Subjective:     History of Present Illness: 33 M presents for eval of large right IPH with IVH and midline shift.  He was at [facility] and was admitted for polysubstance abuse and was +for cocaine, heroin, THC, amphetamines.  Today he stopped moving his LUE and CT head showed large hemorrhage.  NSGY consulted for eval.    Post-Op Info:  * No surgery found *                Medications:  Continuous Infusions:    Scheduled Meds:   amLODIPine  5 mg Oral Daily    folic acid  1 mg Oral Daily    levetiracetam IVPB  500 mg Intravenous Q12H    lidocaine-EPINEPHrine 1%-1:100,000  10 mL Intradermal Once    senna-docusate 8.6-50 mg  1 tablet Oral BID    thiamine  100 mg Oral Daily     PRN Meds:acetaminophen, calcium gluconate IVPB, calcium gluconate IVPB, calcium gluconate IVPB, hydrALAZINE, magnesium sulfate IVPB, magnesium sulfate IVPB, midazolam, ondansetron, potassium chloride in water **AND** potassium chloride in water **AND** potassium chloride in water, buffered 2% sodium acetate 86meq, sodium chloride 86meq, sterile water for inj IV soln, sodium chloride 0.9%, sodium chloride 0.9%, sodium phosphate IVPB, sodium phosphate IVPB, sodium phosphate IVPB     Review of Systems    Objective:     Weight: 59.4 kg (131 lb)  Body mass index is 18.8 kg/m².  Vital Signs (Most Recent):  Temp: 99.2 °F (37.3 °C) (06/22/19 1130)  Pulse: (!) 57 (06/22/19 1300)  Resp: (!) 26 (06/22/19 1300)  BP: 131/71 (06/22/19 1300)  SpO2: 100 % (06/22/19 1300) Vital Signs (24h Range):  Temp:  [98.9 °F (37.2 °C)-100.5 °F (38.1 °C)] 99.2 °F (37.3 °C)  Pulse:  [54-75] 57  Resp:  [24-44] 26  SpO2:  [97 %-100 %] 100 %  BP: (112-164)/() 131/71  Arterial Line BP: (117-155)/(58-96) 129/64     Date 06/22/19 0700 - 06/23/19 0659   Shift 2864-2961 2639-7460 6150-7844 24 Hour Total   INTAKE   P.O. 120   120   I.V.(mL/kg) 48.3(0.8)   48.3(0.8)   IV Piggyback 100   100   Shift  "Total(mL/kg) 268.3(4.5)   268.3(4.5)   OUTPUT   Urine(mL/kg/hr) 350   350   Shift Total(mL/kg) 350(5.9)   350(5.9)   Weight (kg) 59.4 59.4 59.4 59.4                        Urethral Catheter 06/15/19 2005 Latex 16 Fr. (Active)   Site Assessment Clean;Intact 6/20/2019  4:05 AM   Collection Container Urimeter 6/20/2019  4:05 AM   Securement Method secured to top of thigh w/ adhesive device 6/20/2019  4:05 AM   Catheter Care Performed yes 6/20/2019  4:05 AM   Reason for Continuing Urinary Catheterization Critically ill in ICU requiring intensive monitoring 6/20/2019  4:05 AM   CAUTI Prevention Bundle StatLock in place w 1" slack;Intact seal between catheter & drainage tubing;Drainage bag off the floor;Green sheeting clip in use;No dependent loops or kinks;Drainage bag not overfilled (<2/3 full);Drainage bag below bladder 6/20/2019  4:05 AM   Output (mL) 200 mL 6/20/2019 10:00 AM            Urethral Catheter (Active)       Neurosurgery Physical Exam    Head: normocephalic, atraumatic  Neurologic:   GCS: Eyes 3; easily arousable. Verbal 4, Motor 6  Mental Status: awake, however not opening eyes unless stimulated, but able to participate in converation  Oriented to self but not place (knows is hospital) and not year.   Cranial nerves: PERRL, R sided gaze preference.     RUE and RLE following commands, no movement of the left upper extremity. LLE localizes  DTR: 2+ symmetrically throughout.  Pulmonary: normal respirations, no signs of respiratory distress     Significant Labs:  Recent Labs   Lab 06/21/19  0253  06/22/19  0155 06/22/19  0548 06/22/19  1158   *  --  116*  --   --    *   < > 145 143 144   K 3.4*   < > 3.8 3.6 3.7   *  --  112*  --   --    CO2 22*  --  24  --   --    BUN 12  --  11  --   --    CREATININE 0.7  --  0.7  --   --    CALCIUM 8.8  --  9.0  --   --    MG 2.2  --  2.4  --   --     < > = values in this interval not displayed.     Recent Labs   Lab 06/21/19  0253 06/22/19  0155   WBC " 14.30* 16.19*   HGB 12.9* 13.5*   HCT 38.8* 40.0    334     No results for input(s): LABPT, INR, APTT in the last 48 hours.  Microbiology Results (last 7 days)     Procedure Component Value Units Date/Time    Culture, Respiratory with Gram Stain [[id number]] Collected:  06/22/19 0646    Order Status:  Completed Specimen:  Respiratory from Sputum Updated:  06/22/19 0914     Gram Stain (Respiratory) <10 epithelial cells per low power field.     Gram Stain (Respiratory) Few WBC's     Gram Stain (Respiratory) Rare yeast    Narrative:       Mini-BAL.    Blood culture [[id number]] Collected:  06/22/19 0643    Order Status:  Sent Specimen:  Blood from Peripheral, Foot, Left Updated:  06/22/19 0717    Blood culture [[id number]] Collected:  06/22/19 0633    Order Status:  Sent Specimen:  Blood from Peripheral, Ankle, Right Updated:  06/22/19 0712    Blood culture [[id number]]     Order Status:  Canceled Specimen:  Blood     Blood culture [[id number]]     Order Status:  Canceled Specimen:  Blood     Culture, Respiratory with Gram Stain [[id number]]     Order Status:  No result Specimen:  Respiratory         CMP:   Recent Labs   Lab 06/21/19  0253  06/22/19  0155 06/22/19  0548 06/22/19  1158   *  --  116*  --   --    CALCIUM 8.8  --  9.0  --   --    ALBUMIN 3.3*  --  3.6  --   --    PROT 6.0  --  6.6  --   --    *   < > 145 143 144   K 3.4*   < > 3.8 3.6 3.7   CO2 22*  --  24  --   --    *  --  112*  --   --    BUN 12  --  11  --   --    CREATININE 0.7  --  0.7  --   --    ALKPHOS 54*  --  54*  --   --    ALT 74*  --  64*  --   --    AST 72*  --  53*  --   --    BILITOT 0.9  --  1.0  --   --     < > = values in this interval not displayed.     CRP: No results for input(s): CRP in the last 48 hours.  ESR: No results for input(s): POCESR, ERYTHROCYTES in the last 48 hours.  LFTs:   Recent Labs   Lab 06/21/19  0253 06/22/19  0155   ALT 74* 64*   AST 72* 53*   ALKPHOS 54* 54*   BILITOT 0.9 1.0   PROT 6.0 6.6    ALBUMIN 3.3* 3.6       Significant Diagnostics:  Reviewed    Assessment/Plan:     * Nontraumatic cortical hemorrhage of cerebral hemisphere  33 M with polysubstance abuse presents with large right parietooccipital ICH and IVH.  ICH score of 3.     --Continue care per primary team.  --Will arrange for pt to follow up in three weeks in outpatient clinic with interval head CT.  --Neurosurgery will be signing off, please contact us with any questions or concerns.        Aubrey Edwards MD  Neurosurgery  [facility]

## 2019-06-22 NOTE — ASSESSMENT & PLAN NOTE
32 y/o IV drug abuser with large R ICH with IVH.  Etiology pending workup. MRV negative.  Angiogram completed w/ no signs of AVM or aneurysm but concern for vasculitis. 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     Patient remains on 2% gtt. Following simple commands    Antithrombotics: None ICH    Statins: None ICH    Aggressive risk factor modification: drug use, smoker     Rehab efforts: The patient has been evaluated by a stroke team provider and the therapy needs have been fully considered based off the presenting complaints and exam findings. The following therapy evaluations are needed: PT evaluate and treat, OT evaluate and treat, SLP evaluate and treat, PM&R evaluate for appropriate placement    Diagnostics ordered/pending: CT for any changes in neurological status, TCD to assess possible vasospasm     VTE prophylaxis: SCDs only large ICH    BP parameters: ICH: SBP <140

## 2019-06-22 NOTE — PROGRESS NOTES
STEPHANIE Geller at bedside re-discussed sodium of 143, instructed to restart 2% at 20cc/hr and continue to trend sodiums as ordered.

## 2019-06-22 NOTE — PROGRESS NOTES
Exam stable on hyperosmolar tx  Improving leukocytosis  cpk downtrending  1/4 blood cultures pos for gpcs, abx stopped yday  Complains of headache this am    Vital signs, lab studies, and imaging reviewed by me  Alert, oriented x2 easily redirected, L visuospatial neglect w R gaze pref, L weak  Warm and well perfused, regular rate and rhythm, no murmurs  No dependent edema  Breathing comfortably, breath sounds clear  Belly soft, nontender, no hepatosplenomegaly    A/p  34 yo male transfer from osh w R ich/ivh complicated by hydrocephalus, cerebral edema and brain compression requiring hyperosmolar therapy, and debility. 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   -begin weaning hts, goal na 145-150  -sbp<160, no issues  -Mg for headache  -pt/ot, slp    Rhabdomyolysis, cpk downtrending  -po hydration, monitor    GPC bacteremia, suspect contaminant. Afebrile, leukocytosis improving off abx.   -monitor off abx    I spent 30 min of uninterrupted critical care time caring for this critically ill patient exclusive of procedures and teaching.

## 2019-06-22 NOTE — ASSESSMENT & PLAN NOTE
33 M with polysubstance abuse presents with large right parietooccipital ICH and IVH.  ICH score of 3.     -Admitted to ncc for q 1 hr neuro checks  -SBP less than 140  -Vascular neurology on board  -Care per NCC.  -We will continue to monitor closely, call for any change in exam

## 2019-06-22 NOTE — PROGRESS NOTES
Dr. Uribe at bedside aware of noon sodium, instructed to turn off 2% and sodium goal 140-145, will keep trending sodium q 6 hrs as ordered.

## 2019-06-22 NOTE — PLAN OF CARE
Problem: Adult Inpatient Plan of Care  Goal: Plan of Care Review  Outcome: Ongoing (interventions implemented as appropriate)  POC reviewed with pt at 0400. Pt verbalized understanding. Questions and concerns addressed. Temp spiked to 100.5, responded to ice packs and fan. Orientation fluctuated throughout night. Pt agitated. K replaced. Will continue to monitor. See flowsheets for full assessment and VS info

## 2019-06-22 NOTE — PLAN OF CARE
Problem: Adult Inpatient Plan of Care  Goal: Plan of Care Review  Outcome: Revised  POC reviewed with pt  at 1400. Pt verbalized understanding minimally. Questions and concerns addressed. No acute events today. Pt progressing toward goals. Will continue to monitor. See flowsheets for full assessment and VS info. MD aware of sodiums, temp, less agitation today, discussed TCDs and ordered.

## 2019-06-23 NOTE — PROGRESS NOTES
Dr. Damon and team at bedside, sodiums discussed, LOC, aware of periods of agitation and possible hallucinations but remains very alert. Will change sodium checks to q 12hrs, goal is normal and possible stepdown tomorrow after trending TCD x 1 more day.

## 2019-06-23 NOTE — SUBJECTIVE & OBJECTIVE
Medications:  Continuous Infusions:    Scheduled Meds:   amLODIPine  5 mg Oral Daily    folic acid  1 mg Oral Daily    levetiracetam oral soln  500 mg Oral BID    lidocaine-EPINEPHrine 1%-1:100,000  10 mL Intradermal Once    senna-docusate 8.6-50 mg  1 tablet Oral BID    thiamine  100 mg Oral Daily     PRN Meds:acetaminophen, calcium gluconate IVPB, calcium gluconate IVPB, calcium gluconate IVPB, hydrALAZINE, magnesium sulfate IVPB, magnesium sulfate IVPB, midazolam, ondansetron, potassium chloride 10%, potassium chloride 10%, potassium chloride 10%, potassium, sodium phosphates, potassium, sodium phosphates, potassium, sodium phosphates, buffered 2% sodium acetate 86meq, sodium chloride 86meq, sterile water for inj IV soln, sodium chloride 0.9%, sodium chloride 0.9%, sodium phosphate IVPB, sodium phosphate IVPB, sodium phosphate IVPB     Review of Systems    Objective:     Weight: 59.4 kg (131 lb)  Body mass index is 18.8 kg/m².  Vital Signs (Most Recent):  Temp: 98.9 °F (37.2 °C) (06/23/19 1500)  Pulse: 60 (06/23/19 1700)  Resp: (!) 25 (06/23/19 1700)  BP: 111/73 (06/23/19 1700)  SpO2: 100 % (06/23/19 1700) Vital Signs (24h Range):  Temp:  [98.9 °F (37.2 °C)-99.5 °F (37.5 °C)] 98.9 °F (37.2 °C)  Pulse:  [53-83] 60  Resp:  [19-53] 25  SpO2:  [99 %-100 %] 100 %  BP: (111-158)/(57-93) 111/73  Arterial Line BP: (107-146)/(67-95) 131/74     Date 06/23/19 0700 - 06/24/19 0659   Shift 5849-7065 4943-0154 3748-2812 24 Hour Total   INTAKE   P.O. 120   120   I.V.(mL/kg) 40(0.7) 15(0.3)  55(0.9)   Shift Total(mL/kg) 160(2.7) 15(0.3)  175(2.9)   OUTPUT   Urine(mL/kg/hr) 500(1.1)   500   Shift Total(mL/kg) 500(8.4)   500(8.4)   Weight (kg) 59.4 59.4 59.4 59.4            Urethral Catheter 06/15/19 2005 Latex 16 Fr. (Active)   Site Assessment Clean;Intact 6/20/2019  4:05 AM   Collection Container Urimeter 6/20/2019  4:05 AM   Securement Method secured to top of thigh w/ adhesive device 6/20/2019  4:05 AM   Catheter  "Care Performed yes 6/20/2019  4:05 AM   Reason for Continuing Urinary Catheterization Critically ill in ICU requiring intensive monitoring 6/20/2019  4:05 AM   CAUTI Prevention Bundle StatLock in place w 1" slack;Intact seal between catheter & drainage tubing;Drainage bag off the floor;Green sheeting clip in use;No dependent loops or kinks;Drainage bag not overfilled (<2/3 full);Drainage bag below bladder 6/20/2019  4:05 AM   Output (mL) 200 mL 6/20/2019 10:00 AM            Urethral Catheter (Active)       Neurosurgery Physical Exam    Head: normocephalic, atraumatic  Neurologic:   GCS: Eyes 3; easily arousable. Verbal 4, Motor 6  Mental Status: awake, however not opening eyes unless stimulated, but able to participate in converation  Oriented to self but not place (knows is hospital) and not year.   Cranial nerves: PERRL, R sided gaze preference.   RUE and RLE following commands, no movement of the left upper extremity. LLE localizes  DTR: 2+ symmetrically throughout.  Pulmonary: normal respirations, no signs of respiratory distress     Significant Labs:  Recent Labs   Lab 06/22/19 0155 06/22/19  2330 06/23/19  0221 06/23/19  0536 06/23/19  1616   *  --   --  110  --   --       < > 138 139 139  --    K 3.8   < > 3.9 3.5  --  3.6   *  --   --  107  --   --    CO2 24  --   --  23  --   --    BUN 11  --   --  15  --   --    CREATININE 0.7  --   --  0.7  --   --    CALCIUM 9.0  --   --  9.2  --   --    MG 2.4  --   --  2.2  --   --     < > = values in this interval not displayed.     Recent Labs   Lab 06/22/19 0155 06/23/19 0221   WBC 16.19* 16.43*   HGB 13.5* 14.3   HCT 40.0 42.0    327     No results for input(s): LABPT, INR, APTT in the last 48 hours.  Microbiology Results (last 7 days)     Procedure Component Value Units Date/Time    Blood culture [[id number]] Collected:  06/22/19 0633    Order Status:  Completed Specimen:  Blood from Peripheral, Ankle, Right Updated:  06/23/19 0822     " Blood Culture, Routine No Growth to date     Blood Culture, Routine No Growth to date    Narrative:       Blood cultures from 2 different sites. 4 bottles total.  Please draw before starting antibiotics.    Blood culture [[id number]] Collected:  06/22/19 0643    Order Status:  Completed Specimen:  Blood from Peripheral, Foot, Left Updated:  06/23/19 0822     Blood Culture, Routine No Growth to date     Blood Culture, Routine No Growth to date    Narrative:       Blood cultures x 2 different sites. 4 bottles total. Please  draw cultures before administering antibiotics.    Culture, Respiratory with Gram Stain [[id number]] Collected:  06/22/19 0646    Order Status:  Completed Specimen:  Respiratory from Sputum Updated:  06/23/19 0714     Respiratory Culture Normal respiratory yasir     Gram Stain (Respiratory) <10 epithelial cells per low power field.     Gram Stain (Respiratory) Few WBC's     Gram Stain (Respiratory) Rare yeast    Narrative:       Mini-BAL.    Blood culture [[id number]]     Order Status:  Canceled Specimen:  Blood     Blood culture [[id number]]     Order Status:  Canceled Specimen:  Blood     Culture, Respiratory with Gram Stain [[id number]]     Order Status:  No result Specimen:  Respiratory         CMP:   Recent Labs   Lab 06/22/19  0155  06/22/19  2330 06/23/19  0221 06/23/19  0536 06/23/19  1616   *  --   --  110  --   --    CALCIUM 9.0  --   --  9.2  --   --    ALBUMIN 3.6  --   --  3.7  --   --    PROT 6.6  --   --  7.0  --   --       < > 138 139 139  --    K 3.8   < > 3.9 3.5  --  3.6   CO2 24  --   --  23  --   --    *  --   --  107  --   --    BUN 11  --   --  15  --   --    CREATININE 0.7  --   --  0.7  --   --    ALKPHOS 54*  --   --  59  --   --    ALT 64*  --   --  52*  --   --    AST 53*  --   --  33  --   --    BILITOT 1.0  --   --  1.1*  --   --     < > = values in this interval not displayed.     CRP: No results for input(s): CRP in the last 48 hours.  ESR: No results  for input(s): POCESR, ERYTHROCYTES in the last 48 hours.  LFTs:   Recent Labs   Lab 06/22/19  0155 06/23/19  0221   ALT 64* 52*   AST 53* 33   ALKPHOS 54* 59   BILITOT 1.0 1.1*   PROT 6.6 7.0   ALBUMIN 3.6 3.7       Significant Diagnostics:  Reviewed

## 2019-06-23 NOTE — PROGRESS NOTES
Afebrile, minor leukocytosis  Some agitation overnight, did not require any sedation  tcd performed this am     Vital signs, lab studies, and imaging reviewed by me  Alert, oriented x2 easily redirected, L visuospatial neglect w R gaze pref, L weak  Warm and well perfused, regular rate and rhythm, no murmurs  No dependent edema  Breathing comfortably, breath sounds clear  Belly soft, nontender, no hepatosplenomegaly     A/p  32 yo male transfer from osh w R ich/ivh complicated by hydrocephalus, cerebral edema and brain compression requiring hyperosmolar therapy, and debility. 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   -target eunatremia today, dc hts  -sbp<160, no issues  -follow tcds  -pt/ot, oob, slp     Leukocytosis, low grade fever  -f/u bcx, check ua, monitor off abx

## 2019-06-23 NOTE — PLAN OF CARE
Problem: Adult Inpatient Plan of Care  Goal: Plan of Care Review  Outcome: Ongoing (interventions implemented as appropriate)  POC reviewed with pt and family at 0430. Pt verbalized understanding. Questions and concerns addressed. No acute events today. Will continue to monitor. Replacing K. See flowsheets for full assessment and VS info.

## 2019-06-23 NOTE — SUBJECTIVE & OBJECTIVE
Neurologic Chief Complaint: Right ICH    Subjective:     Interval History: Patient is seen for follow-up neurological assessment and treatment recommendations:  TCDs daily, results pending. Of note possible step down tomorrow.      HPI, Past Medical, Family, and Social History remains the same as documented in the initial encounter.     Review of Systems   Constitutional: Negative for fever.   Eyes: Positive for visual disturbance.   Respiratory: Negative for shortness of breath.    Cardiovascular: Negative for chest pain.   Gastrointestinal: Negative for vomiting.   Neurological: Positive for speech difficulty and weakness. Negative for headaches.     Scheduled Meds:   amLODIPine  5 mg Oral Daily    folic acid  1 mg Oral Daily    levetiracetam oral soln  500 mg Oral BID    lidocaine-EPINEPHrine 1%-1:100,000  10 mL Intradermal Once    senna-docusate 8.6-50 mg  1 tablet Oral BID    thiamine  100 mg Oral Daily     Continuous Infusions:    PRN Meds:acetaminophen, calcium gluconate IVPB, calcium gluconate IVPB, calcium gluconate IVPB, hydrALAZINE, magnesium sulfate IVPB, magnesium sulfate IVPB, midazolam, ondansetron, potassium chloride in water **AND** potassium chloride in water **AND** potassium chloride in water, buffered 2% sodium acetate 86meq, sodium chloride 86meq, sterile water for inj IV soln, sodium chloride 0.9%, sodium chloride 0.9%, sodium phosphate IVPB, sodium phosphate IVPB, sodium phosphate IVPB    Objective:     Vital Signs (Most Recent):  Temp: 98.9 °F (37.2 °C) (06/23/19 1500)  Pulse: (!) 59 (06/23/19 1500)  Resp: (!) 24 (06/23/19 1500)  BP: 113/70 (06/23/19 1500)  SpO2: 100 % (06/23/19 1500)  BP Location: Left arm    Vital Signs Range (Last 24H):  Temp:  [98.9 °F (37.2 °C)-99.7 °F (37.6 °C)]   Pulse:  [53-83]   Resp:  [19-53]   BP: (113-158)/(57-93)   SpO2:  [99 %-100 %]   Arterial Line BP: (107-146)/(67-95)   BP Location: Left arm    Physical Exam   Constitutional:   Underweight; multiple  small wounds over body   Cardiovascular: Normal rate.   Pulmonary/Chest:   Tachypnic   Skin: Skin is warm. Rash noted.   Nursing note and vitals reviewed.      Neurological Exam:   LOC: alert  Attention Span: poor  Language: No aphasia  Articulation: Dysarthria  Orientation: Not oriented to place, Not oriented to time  Visual Fields: Inconsistent blink to threat on the left  EOM (CN III, IV, VI): Gaze preference  right  Pupils (CN II, III): PERRL  Facial Sensation (CN V): Normal  Facial Movement (CN VII): Lower facial weakness on the Left  Motor: Spontaneous LUE 2/5, RUE 5/5, RLE 5/5; withdraws LLE 0/5  Cebellar: No evidence of appendicular or axial ataxia  Sensation: Decreased sensation on L  Tone: Increased tone LUE    Laboratory:  CMP:   Recent Labs   Lab 06/23/19 0221 06/23/19  0536   CALCIUM 9.2  --    ALBUMIN 3.7  --    PROT 7.0  --     139   K 3.5  --    CO2 23  --      --    BUN 15  --    CREATININE 0.7  --    ALKPHOS 59  --    ALT 52*  --    AST 33  --    BILITOT 1.1*  --      BMP:   Recent Labs   Lab 06/23/19 0221 06/23/19  0536    139   K 3.5  --      --    CO2 23  --    BUN 15  --    CREATININE 0.7  --    CALCIUM 9.2  --      CBC:   Recent Labs   Lab 06/23/19 0221   WBC 16.43*   RBC 4.62   HGB 14.3   HCT 42.0      MCV 91   MCH 31.0   MCHC 34.0     Lipid Panel:   Recent Labs   Lab 06/19/19  1807   CHOL 144   LDLCALC 80.4   HDL 31*   TRIG 163*     Coagulation:   Recent Labs   Lab 06/20/19  0457   INR 1.1   APTT 28.4     Hgb A1C:   Recent Labs   Lab 06/19/19 1807   HGBA1C 5.2     TSH:   Recent Labs   Lab 06/19/19 1807   TSH 1.015       Diagnostic Results     Brain imaging:  MRI 6/20/2019  Large parenchymal hemorrhage centered within the bilateral parietal lobes extending across midline through the splenium of the corpus callosum corresponding to abnormality seen on CT.  There is thin subdural hemorrhage overlying the right cerebral hemisphere with questionable trace  hemorrhage also overlying the left inferior frontal lobe.  Mass effect with approximate 7 mm of leftward midline shift with distortion of the ventricles similar to prior.  Trace prominence of the temporal horns lateral ventricles cannot exclude component of trapped ventricles and early hydrocephalus with remaining ventricular system relatively the normal in caliber.    CT head w/o contrast 6-19-19 results:  1. The study is significantly abnormal.  There is acute intracranial hemorrhage with large parenchymal hemorrhage in the posteromedial right occipital parietal lobe crossing over the splenium of the corpus callosum into the posteromedial left occipital parietal lobe.  There is moderate-to-marked associated edema.  There is approximately 6 mm right to left midline shift.  Subdural blood is also seen along the falx and extending inferiorly along the right tentorium.  The cerebellar tonsils are normal position.  There is compression and depression of the lateral ventricles with moderate to marked dilatation of the temporal horns.     Vessel Imaging:    Angiogram 6/21/2019  Preliminary interpretation: No evidence of aneurysm or AVM. Diffuse irregularity of multiple small and medium sized arteries with multiple areas of narrowing/beading raising question of CNS vasculitis or RCVS,. Please see Imaging report for full details.    MRA/MRV 6/20/2019  MRA head: Unremarkable motion limited MRA of the head specifically without definite abnormal flow related enhancement associated with the region of parenchymal hemorrhage partially included in the study to suggest definite associated vascular malformation.  Correlation and follow up repeat imaging when patient better able to tolerate scanning advised.  Thin subdural hemorrhage overlies the right cerebral hemisphere.  MRV: Motion limited examination as detailed above with dominant right transverse and sigmoid sinuses.  The left sigmoid sinus is not seen and likely obscured by  motion.    CTA Head and neck 6-19-19 results:    Acute intraparenchymal hematoma centered within right and left parietal lobes with extension across the corpus callosum.  Volume of hemorrhage is stable to slightly progressed in comparison to prior exam.  Slightly progressed edema and mass effect resulting in 8 cm leftward midline shift.  No new hemorrhage.    Stable dilatation of the temporal horns of lateral ventricles consistent mild hydrocephalus.    Small volume subdural hemorrhage layering along the falx and tentorium, unchanged    No evidence of large intracranial aneurysm or AVM.  Please note evaluation is limited secondary to extensive hemorrhage and mass effect.  Consider cerebral angiography for further evaluation, as clinically indicated.     Cardiac Evaluation:   EKG 6-19-19 results:  Normal sinus rhythm  Biatrial enlargement  Prolonged QT  Abnormal ECG  When compared with ECG of 16-JUN-2019 12:31,  Vent. rate has increased BY 34 BPM  Non-specific change in ST segment in Inferior leads  QT has lengthened

## 2019-06-23 NOTE — ASSESSMENT & PLAN NOTE
34 y/o IV drug abuser with large R ICH with IVH.  Etiology pending workup. MRV negative.  Angiogram completed w/ no signs of AVM or aneurysm but concern for vasculitis. 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     Off 2% gtt. Following simple commands    Antithrombotics: None ICH    Statins: None ICH    Aggressive risk factor modification: drug use, smoker     Rehab efforts: The patient has been evaluated by a stroke team provider and the therapy needs have been fully considered based off the presenting complaints and exam findings. The following therapy evaluations are needed: PT evaluate and treat, OT evaluate and treat, SLP evaluate and treat, PM&R evaluate for appropriate placement    Diagnostics ordered/pending: CT for any changes in neurological status, TCD to assess possible vasospasm     VTE prophylaxis: SCDs only large ICH    BP parameters: ICH: SBP <140

## 2019-06-23 NOTE — PROGRESS NOTES
[facility]  Neurosurgery  Progress Note    Subjective:     History of Present Illness: 33 M presents for eval of large right IPH with IVH and midline shift.  He was at [facility] and was admitted for polysubstance abuse and was +for cocaine, heroin, THC, amphetamines.  Today he stopped moving his LUE and CT head showed large hemorrhage.  NSGY consulted for eval.    Post-Op Info:  * No surgery found *                Medications:  Continuous Infusions:    Scheduled Meds:   amLODIPine  5 mg Oral Daily    folic acid  1 mg Oral Daily    levetiracetam oral soln  500 mg Oral BID    lidocaine-EPINEPHrine 1%-1:100,000  10 mL Intradermal Once    senna-docusate 8.6-50 mg  1 tablet Oral BID    thiamine  100 mg Oral Daily     PRN Meds:acetaminophen, calcium gluconate IVPB, calcium gluconate IVPB, calcium gluconate IVPB, hydrALAZINE, magnesium sulfate IVPB, magnesium sulfate IVPB, midazolam, ondansetron, potassium chloride 10%, potassium chloride 10%, potassium chloride 10%, potassium, sodium phosphates, potassium, sodium phosphates, potassium, sodium phosphates, buffered 2% sodium acetate 86meq, sodium chloride 86meq, sterile water for inj IV soln, sodium chloride 0.9%, sodium chloride 0.9%, sodium phosphate IVPB, sodium phosphate IVPB, sodium phosphate IVPB     Review of Systems    Objective:     Weight: 59.4 kg (131 lb)  Body mass index is 18.8 kg/m².  Vital Signs (Most Recent):  Temp: 98.9 °F (37.2 °C) (06/23/19 1500)  Pulse: 60 (06/23/19 1700)  Resp: (!) 25 (06/23/19 1700)  BP: 111/73 (06/23/19 1700)  SpO2: 100 % (06/23/19 1700) Vital Signs (24h Range):  Temp:  [98.9 °F (37.2 °C)-99.5 °F (37.5 °C)] 98.9 °F (37.2 °C)  Pulse:  [53-83] 60  Resp:  [19-53] 25  SpO2:  [99 %-100 %] 100 %  BP: (111-158)/(57-93) 111/73  Arterial Line BP: (107-146)/(67-95) 131/74     Date 06/23/19 0700 - 06/24/19 0659   Shift 8777-2276 0567-6404 6622-0077 24 Hour Total   INTAKE   P.O. 120   120   I.V.(mL/kg) 40(0.7)  "15(0.3)  55(0.9)   Shift Total(mL/kg) 160(2.7) 15(0.3)  175(2.9)   OUTPUT   Urine(mL/kg/hr) 500(1.1)   500   Shift Total(mL/kg) 500(8.4)   500(8.4)   Weight (kg) 59.4 59.4 59.4 59.4            Urethral Catheter 06/15/19 2005 Latex 16 Fr. (Active)   Site Assessment Clean;Intact 6/20/2019  4:05 AM   Collection Container Urimeter 6/20/2019  4:05 AM   Securement Method secured to top of thigh w/ adhesive device 6/20/2019  4:05 AM   Catheter Care Performed yes 6/20/2019  4:05 AM   Reason for Continuing Urinary Catheterization Critically ill in ICU requiring intensive monitoring 6/20/2019  4:05 AM   CAUTI Prevention Bundle StatLock in place w 1" slack;Intact seal between catheter & drainage tubing;Drainage bag off the floor;Green sheeting clip in use;No dependent loops or kinks;Drainage bag not overfilled (<2/3 full);Drainage bag below bladder 6/20/2019  4:05 AM   Output (mL) 200 mL 6/20/2019 10:00 AM            Urethral Catheter (Active)       Neurosurgery Physical Exam    Head: normocephalic, atraumatic  Neurologic:   GCS: Eyes 3; easily arousable. Verbal 4, Motor 6  Mental Status: awake, however not opening eyes unless stimulated, but able to participate in converation  Oriented to self but not place (knows is hospital) and not year.   Cranial nerves: PERRL, R sided gaze preference.   RUE and RLE following commands, no movement of the left upper extremity. LLE localizes  DTR: 2+ symmetrically throughout.  Pulmonary: normal respirations, no signs of respiratory distress     Significant Labs:  Recent Labs   Lab 06/22/19  0155  06/22/19  2330 06/23/19  0221 06/23/19  0536 06/23/19  1616   *  --   --  110  --   --       < > 138 139 139  --    K 3.8   < > 3.9 3.5  --  3.6   *  --   --  107  --   --    CO2 24  --   --  23  --   --    BUN 11  --   --  15  --   --    CREATININE 0.7  --   --  0.7  --   --    CALCIUM 9.0  --   --  9.2  --   --    MG 2.4  --   --  2.2  --   --     < > = values in this interval " not displayed.     Recent Labs   Lab 06/22/19  0155 06/23/19  0221   WBC 16.19* 16.43*   HGB 13.5* 14.3   HCT 40.0 42.0    327     No results for input(s): LABPT, INR, APTT in the last 48 hours.  Microbiology Results (last 7 days)     Procedure Component Value Units Date/Time    Blood culture [[id number]] Collected:  06/22/19 0633    Order Status:  Completed Specimen:  Blood from Peripheral, Ankle, Right Updated:  06/23/19 0822     Blood Culture, Routine No Growth to date     Blood Culture, Routine No Growth to date    Narrative:       Blood cultures from 2 different sites. 4 bottles total.  Please draw before starting antibiotics.    Blood culture [[id number]] Collected:  06/22/19 0643    Order Status:  Completed Specimen:  Blood from Peripheral, Foot, Left Updated:  06/23/19 0822     Blood Culture, Routine No Growth to date     Blood Culture, Routine No Growth to date    Narrative:       Blood cultures x 2 different sites. 4 bottles total. Please  draw cultures before administering antibiotics.    Culture, Respiratory with Gram Stain [[id number]] Collected:  06/22/19 0646    Order Status:  Completed Specimen:  Respiratory from Sputum Updated:  06/23/19 0714     Respiratory Culture Normal respiratory yasir     Gram Stain (Respiratory) <10 epithelial cells per low power field.     Gram Stain (Respiratory) Few WBC's     Gram Stain (Respiratory) Rare yeast    Narrative:       Mini-BAL.    Blood culture [[id number]]     Order Status:  Canceled Specimen:  Blood     Blood culture [[id number]]     Order Status:  Canceled Specimen:  Blood     Culture, Respiratory with Gram Stain [[id number]]     Order Status:  No result Specimen:  Respiratory         CMP:   Recent Labs   Lab 06/22/19  0155  06/22/19  2330 06/23/19  0221 06/23/19  0536 06/23/19  1616   *  --   --  110  --   --    CALCIUM 9.0  --   --  9.2  --   --    ALBUMIN 3.6  --   --  3.7  --   --    PROT 6.6  --   --  7.0  --   --       < > 138 139 139   --    K 3.8   < > 3.9 3.5  --  3.6   CO2 24  --   --  23  --   --    *  --   --  107  --   --    BUN 11  --   --  15  --   --    CREATININE 0.7  --   --  0.7  --   --    ALKPHOS 54*  --   --  59  --   --    ALT 64*  --   --  52*  --   --    AST 53*  --   --  33  --   --    BILITOT 1.0  --   --  1.1*  --   --     < > = values in this interval not displayed.     CRP: No results for input(s): CRP in the last 48 hours.  ESR: No results for input(s): POCESR, ERYTHROCYTES in the last 48 hours.  LFTs:   Recent Labs   Lab 06/22/19  0155 06/23/19  0221   ALT 64* 52*   AST 53* 33   ALKPHOS 54* 59   BILITOT 1.0 1.1*   PROT 6.6 7.0   ALBUMIN 3.6 3.7       Significant Diagnostics:  Reviewed    Assessment/Plan:     * Nontraumatic cortical hemorrhage of cerebral hemisphere  33 M with polysubstance abuse presents with large right parietooccipital ICH and IVH.  ICH score of 3.     -Admitted to [facility] for q 1 hr neuro checks   -SBP less than 140  -Vascular neurology on board  -Care per NCC.  -We will continue to monitor closely, call for any change in exam        Aubrey Edwards MD  Neurosurgery  [facility]

## 2019-06-23 NOTE — PLAN OF CARE
"Problem: Adult Inpatient Plan of Care  Goal: Plan of Care Review  Outcome: Revised  POC reviewed with pt at 1000. Pt verbalized understanding. Questions and concerns addressed. No acute events today. Pt progressing toward goals. Will continue to monitor. See flowsheets for full assessment and VS info. Plan to allow sodium goal to be "normalized", watch temp and white count, possibly step down tomorrow.          "

## 2019-06-23 NOTE — PROGRESS NOTES
[facility]  Vascular Neurology  Comprehensive Stroke Center  Progress Note    Assessment/Plan:     * Nontraumatic cortical hemorrhage of cerebral hemisphere  34 y/o IV drug abuser with large R ICH with IVH.  Etiology pending workup. MRV negative.  Angiogram completed w/ no signs of AVM or aneurysm but concern for vasculitis. 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     Off 2% gtt. Following simple commands    Antithrombotics: None ICH    Statins: None ICH    Aggressive risk factor modification: drug use, smoker     Rehab efforts: The patient has been evaluated by a stroke team provider and the therapy needs have been fully considered based off the presenting complaints and exam findings. The following therapy evaluations are needed: PT evaluate and treat, OT evaluate and treat, SLP evaluate and treat, PM&R evaluate for appropriate placement    Diagnostics ordered/pending: CT for any changes in neurological status, TCD to assess possible vasospasm     VTE prophylaxis: SCDs only large ICH    BP parameters: ICH: SBP <140        Brain compression  Off HTS.   Monitoring closely  Management per primary     Current smoker  Risk factor  Smoking cessation when appropriate    Vasogenic cerebral edema  Area of vasogenic cerebral edema identified when reviewing brain imaging in the territory of the R middle cerebral artery. There is mass effect associated with it. We will continue to monitor the patients clinical exam for any worsening of symptoms which may indicate expansion of the stroke or the area of the edema resulting in the clinical change. The pattern is suggestive of drug induced vasculopathy etiology         Left hemiparesis  Due to ICH  Aggressive therapy when appropriate    Leukocytosis  WBC 16. Tmax 99.7  Off abx  Management per primary     Drug abuse, IV  Stroke risk factor  Counseling when appropriate         6/20:  Restless but cooperative. On 2% HTS  6/21: Patient  continues on 2% and Cardene gtt. Following simple commands.   6/22: On 2% gtt. Off cardene gtt.   6/23: TCDs daily, results pending. Of note possible step down tomorrow.    STROKE DOCUMENTATION        NIH Scale:  1a. Level of Consciousness: 0-->Alert, keenly responsive  1b. LOC Questions: 1-->Answers one question correctly  1c. LOC Commands: 0-->Performs both tasks correctly  2. Best Gaze: 1-->Partial gaze palsy, gaze is abnormal in one or both eyes, but forced deviation or total gaze paresis is not present  3. Visual: 2-->Complete hemianopia  4. Facial Palsy: 1-->Minor paralysis (flattened nasolabial fold, asymmetry on smiling)  5a. Motor Arm, Left: 3-->No effort against gravity, limb falls  5b. Motor Arm, Right: 0-->No drift, limb holds 90 (or 45) degrees for full 10 secs  6a. Motor Leg, Left: 4-->No movement  6b. Motor Leg, Right: 4-->No movement  7. Limb Ataxia: 0-->Absent  8. Sensory: 0-->Normal, no sensory loss  9. Best Language: 0-->No aphasia, normal  10. Dysarthria: 1-->Mild-to-moderate dysarthria, patient slurs at least some words and, at worst, can be understood with some difficulty  11. Extinction and Inattention (formerly Neglect): 0-->No abnormality  Total (NIH Stroke Scale): 17       Modified [place] Score: 0  Carney Coma Scale:    ABCD2 Score:    [id number] Score:   HAS -BLED Score:   ICH Score:3  Hunt & Dougherty Classification:      Hemorrhagic change of an Ischemic Stroke: Does this patient have an ischemic stroke with hemorrhagic changes? No     Neurologic Chief Complaint: Right ICH    Subjective:     Interval History: Patient is seen for follow-up neurological assessment and treatment recommendations:  TCDs daily, results pending. Of note possible step down tomorrow.      HPI, Past Medical, Family, and Social History remains the same as documented in the initial encounter.     Review of Systems   Constitutional: Negative for fever.   Eyes: Positive for visual disturbance.   Respiratory: Negative  for shortness of breath.    Cardiovascular: Negative for chest pain.   Gastrointestinal: Negative for vomiting.   Neurological: Positive for speech difficulty and weakness. Negative for headaches.     Scheduled Meds:   amLODIPine  5 mg Oral Daily    folic acid  1 mg Oral Daily    levetiracetam oral soln  500 mg Oral BID    lidocaine-EPINEPHrine 1%-1:100,000  10 mL Intradermal Once    senna-docusate 8.6-50 mg  1 tablet Oral BID    thiamine  100 mg Oral Daily     Continuous Infusions:    PRN Meds:acetaminophen, calcium gluconate IVPB, calcium gluconate IVPB, calcium gluconate IVPB, hydrALAZINE, magnesium sulfate IVPB, magnesium sulfate IVPB, midazolam, ondansetron, potassium chloride in water **AND** potassium chloride in water **AND** potassium chloride in water, buffered 2% sodium acetate 86meq, sodium chloride 86meq, sterile water for inj IV soln, sodium chloride 0.9%, sodium chloride 0.9%, sodium phosphate IVPB, sodium phosphate IVPB, sodium phosphate IVPB    Objective:     Vital Signs (Most Recent):  Temp: 98.9 °F (37.2 °C) (06/23/19 1500)  Pulse: (!) 59 (06/23/19 1500)  Resp: (!) 24 (06/23/19 1500)  BP: 113/70 (06/23/19 1500)  SpO2: 100 % (06/23/19 1500)  BP Location: Left arm    Vital Signs Range (Last 24H):  Temp:  [98.9 °F (37.2 °C)-99.7 °F (37.6 °C)]   Pulse:  [53-83]   Resp:  [19-53]   BP: (113-158)/(57-93)   SpO2:  [99 %-100 %]   Arterial Line BP: (107-146)/(67-95)   BP Location: Left arm    Physical Exam   Constitutional:   Underweight; multiple small wounds over body   Cardiovascular: Normal rate.   Pulmonary/Chest:   Tachypnic   Skin: Skin is warm. Rash noted.   Nursing note and vitals reviewed.      Neurological Exam:   LOC: alert  Attention Span: poor  Language: No aphasia  Articulation: Dysarthria  Orientation: Not oriented to place, Not oriented to time  Visual Fields: Inconsistent blink to threat on the left  EOM (CN III, IV, VI): Gaze preference  right  Pupils (CN II, III): PERRL  Facial  Sensation (CN V): Normal  Facial Movement (CN VII): Lower facial weakness on the Left  Motor: Spontaneous LUE 2/5, RUE 5/5, RLE 5/5; withdraws LLE 0/5  Cebellar: No evidence of appendicular or axial ataxia  Sensation: Decreased sensation on L  Tone: Increased tone LUE    Laboratory:  CMP:   Recent Labs   Lab 06/23/19 0221 06/23/19  0536   CALCIUM 9.2  --    ALBUMIN 3.7  --    PROT 7.0  --     139   K 3.5  --    CO2 23  --      --    BUN 15  --    CREATININE 0.7  --    ALKPHOS 59  --    ALT 52*  --    AST 33  --    BILITOT 1.1*  --      BMP:   Recent Labs   Lab 06/23/19 0221 06/23/19  0536    139   K 3.5  --      --    CO2 23  --    BUN 15  --    CREATININE 0.7  --    CALCIUM 9.2  --      CBC:   Recent Labs   Lab 06/23/19 0221   WBC 16.43*   RBC 4.62   HGB 14.3   HCT 42.0      MCV 91   MCH 31.0   MCHC 34.0     Lipid Panel:   Recent Labs   Lab 06/19/19 1807   CHOL 144   LDLCALC 80.4   HDL 31*   TRIG 163*     Coagulation:   Recent Labs   Lab 06/20/19  0457   INR 1.1   APTT 28.4     Hgb A1C:   Recent Labs   Lab 06/19/19 1807   HGBA1C 5.2     TSH:   Recent Labs   Lab 06/19/19 1807   TSH 1.015       Diagnostic Results     Brain imaging:  MRI 6/20/2019  Large parenchymal hemorrhage centered within the bilateral parietal lobes extending across midline through the splenium of the corpus callosum corresponding to abnormality seen on CT.  There is thin subdural hemorrhage overlying the right cerebral hemisphere with questionable trace hemorrhage also overlying the left inferior frontal lobe.  Mass effect with approximate 7 mm of leftward midline shift with distortion of the ventricles similar to prior.  Trace prominence of the temporal horns lateral ventricles cannot exclude component of trapped ventricles and early hydrocephalus with remaining ventricular system relatively the normal in caliber.    CT head w/o contrast 6-19-19 results:  1. The study is significantly abnormal.  There is  acute intracranial hemorrhage with large parenchymal hemorrhage in the posteromedial right occipital parietal lobe crossing over the splenium of the corpus callosum into the posteromedial left occipital parietal lobe.  There is moderate-to-marked associated edema.  There is approximately 6 mm right to left midline shift.  Subdural blood is also seen along the falx and extending inferiorly along the right tentorium.  The cerebellar tonsils are normal position.  There is compression and depression of the lateral ventricles with moderate to marked dilatation of the temporal horns.     Vessel Imaging:    Angiogram 6/21/2019  Preliminary interpretation: No evidence of aneurysm or AVM. Diffuse irregularity of multiple small and medium sized arteries with multiple areas of narrowing/beading raising question of CNS vasculitis or RCVS,. Please see Imaging report for full details.    MRA/MRV 6/20/2019  MRA head: Unremarkable motion limited MRA of the head specifically without definite abnormal flow related enhancement associated with the region of parenchymal hemorrhage partially included in the study to suggest definite associated vascular malformation.  Correlation and follow up repeat imaging when patient better able to tolerate scanning advised.  Thin subdural hemorrhage overlies the right cerebral hemisphere.  MRV: Motion limited examination as detailed above with dominant right transverse and sigmoid sinuses.  The left sigmoid sinus is not seen and likely obscured by motion.    CTA Head and neck 6-19-19 results:    Acute intraparenchymal hematoma centered within right and left parietal lobes with extension across the corpus callosum.  Volume of hemorrhage is stable to slightly progressed in comparison to prior exam.  Slightly progressed edema and mass effect resulting in 8 cm leftward midline shift.  No new hemorrhage.    Stable dilatation of the temporal horns of lateral ventricles consistent mild  hydrocephalus.    Small volume subdural hemorrhage layering along the falx and tentorium, unchanged    No evidence of large intracranial aneurysm or AVM.  Please note evaluation is limited secondary to extensive hemorrhage and mass effect.  Consider cerebral angiography for further evaluation, as clinically indicated.     Cardiac Evaluation:   EKG 6-19-19 results:  Normal sinus rhythm  Biatrial enlargement  Prolonged QT  Abnormal ECG  When compared with ECG of 16-JUN-2019 12:31,  Vent. rate has increased BY 34 BPM  Non-specific change in ST segment in Inferior leads  QT has lengthened      Gerald Weber NP  [facility]  Department of Vascular Neurology   [facility]

## 2019-06-23 NOTE — PROGRESS NOTES
Low grade fever (38.1C), mild leukocytosis  Headache improved w Mg  Na below goal, start back on hts     Vital signs, lab studies, and imaging reviewed by me  Alert, oriented x2 easily redirected, L visuospatial neglect w R gaze pref, L weak  Warm and well perfused, regular rate and rhythm, no murmurs  No dependent edema  Breathing comfortably, breath sounds clear  Belly soft, nontender, no hepatosplenomegaly     A/p  32 yo male transfer from [facility] w R ich/ivh complicated by hydrocephalus, cerebral edema and brain compression requiring hyperosmolar therapy, and debility. 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   -begin weaning hts, goal na 140-145  -sbp<160, no issues  -tcds today  -pt/ot, slp     GPC bacteremia, suspect contaminant.   -monitor off abx, repeat bcx pending  -noninfectious jloly nevarez us dvt neg 2 days ago     I spent 30 min of uninterrupted critical care time caring for this critically ill patient exclusive of procedures and teaching.

## 2019-06-24 NOTE — DISCHARGE SUMMARY
[facility] Medicine  Discharge Summary      Patient Name: Shahid Lackey  MRN: [id number]  Admission Date: 6/15/2019  Hospital Length of Stay: 4 days  Discharge Date and Time: 6/19/2019  4:45 PM  Attending Physician: No att. providers found   Discharging Provider: Laura Allen MD  Primary Care Provider: Artem Rice MD      HPI:   He is a 34 y/o male who presented to an outside ED after he had reportedly snorted some heroin. He got 5mg of Versed by the EMT after he was noted to be highly agitated and combative Upon arrival to emergency department, patient was cooperative but not answering questions appropriately.  Patient has a long standing h/o drug use. In the ED he received haldol, ativan and benadryl. He was also given empiric vancomycin and rocephin for a white count of 26 and lactic acid of 8. He also received 3L NS. UDS positive for opiate, cocaine, amphetamine, THC and BZD. He was given total 8 mg ativan for agitation and dystonia with reported improvement.  He was transferred here for further management in the ICU. At the time of my evaluation the patient was obtunded and was not arousable. Did not appear to be in any acute distress.          Hospital Course:   33-year-old male with a history of polysubstance abuse was admitted June 15th with delirium, rhabdomyolysis, lactic acidosis and leukocytosis.    He was admitted to the intensive care unit and was treated with IV fluids. The rhabdo is improving and his renal function has been stable.  Nephrology has been following.    He was  placed on cefepime after we obtained urine and blood cultures which are not showing any growth.  Chest x-ray did not show pneumonia.  He had low-grade fever overnight but his white blood cell count is improving.     The patient's delirium is improving.  He has been treated with benzodiazepines, morphine, Haldol and Precedex drip.  We are starting to wean him off those medications.    He has  left-sided weakness today and I ordered a CT scan of the brain without contrast which shows a very large intracerebral hemorrhage.  I spoke to neurosurgery and the patient will need to be transferred either [place] [facility] for neuro surgical evaluation and treatment.  He will be loaded with Keppra and placed on IV Cardene to keep his systolic blood pressure less than 140. No mannitol or steroids per neurosurgery.  He had been receiving  Lovenox 40 mg subcu daily for DVT prophylaxis.  This has been discontinued.  He has not received aspirin or NSAIDs.         Consults:   Consults (From admission, onward)        Status Ordering Provider     Inpatient consult to Nephrology  Once     Provider:  MD Tiffanie Cote JESSICA M.            Final Active Diagnoses:    Diagnosis Date Noted POA    PRINCIPAL PROBLEM:  Acute delirium [R41.0] 06/16/2019 Yes    Rhabdomyolysis [M62.82] 06/17/2019 Yes    Left hemiparesis [G81.94] 06/19/2019 No    Elevated blood pressure reading [R03.0] 06/19/2019 Yes    Alteration in nutrition [R63.8] 06/19/2019 Yes    Fever [R50.9] 06/17/2019 Yes    Lactic acidosis [E87.2] 06/16/2019 Unknown    Leukocytosis [D72.829] 07/18/2018 Yes      Problems Resolved During this Admission:    Diagnosis Date Noted Date Resolved POA    JVD (jugular venous distension) [R09.89] 06/16/2019 06/17/2019 Yes    Delirium [R41.0] 06/15/2019 06/17/2019 Yes       Transfer Condition: critical    [place]position[place] [facility]       Medications:  Transfer Medications (for Discharge Readmit only):   No current facility-administered medications for this encounter.      No current outpatient medications on file.     Facility-Administered Medications Ordered in Other Encounters   Medication Dose Route Frequency Provider Last Rate Last Dose    acetaminophen tablet 650 mg  650 mg Oral Q6H PRN Yimi Yuan NP   650 mg at 06/24/19 0910    amLODIPine tablet 5 mg  5 mg Oral Daily Craig Mitchell NP   5 mg  at 06/24/19 0836    calcium gluconate 1g in dextrose 5% 100mL (ready to mix system)  1 g Intravenous PRN Garry Mahan MD        calcium gluconate 1g in dextrose 5% 100mL (ready to mix system)  1 g Intravenous PRN Garry Mahan MD        calcium gluconate 1g in dextrose 5% 100mL (ready to mix system)  1 g Intravenous PRN Garry Mahan MD        folic acid tablet 1 mg  1 mg Oral Daily Craig Mitchell NP   1 mg at 06/24/19 0836    heparin (porcine) injection 5,000 Units  5,000 Units Subcutaneous Q8H Deejay Navarro MD        hydrALAZINE injection 10 mg  10 mg Intravenous Q6H PRN Kathy Nolan NP        magnesium sulfate 2g in water 50mL IVPB (premix)  2 g Intravenous PRN Garry Mahan MD        magnesium sulfate 2g in water 50mL IVPB (premix)  4 g Intravenous PRN Garry Mahan MD        midazolam injection 1 mg  1 mg Intravenous Q5 Min PRN Bindu Linares NP        ondansetron injection 4 mg  4 mg Intravenous Q8H PRN Rekha Jeronimo PA-C        potassium chloride 10% oral solution 40 mEq  40 mEq Oral PRN Peter Christy PA-C   40 mEq at 06/24/19 0249    potassium chloride 10% oral solution 40 mEq  40 mEq Oral PRN Peter Christy PA-C        potassium chloride 10% oral solution 60 mEq  60 mEq Oral PRN Peter Christy PA-C        potassium, sodium phosphates 280-160-250 mg packet 2 packet  2 packet Oral PRN Peter Christy PA-C        potassium, sodium phosphates 280-160-250 mg packet 2 packet  2 packet Oral PRN Peter Christy PA-C        potassium, sodium phosphates 280-160-250 mg packet 2 packet  2 packet Oral PRN Peter Christy PA-C        senna-docusate 8.6-50 mg per tablet 1 tablet  1 tablet Oral BID Rekha Jeronimo PA-C   1 tablet at 06/24/19 0836    sodium chloride 0.9% flush 10 mL  10 mL Intravenous PRN Rekha Jeronimo PA-C        sodium chloride 0.9% flush 10 mL  10 mL Intravenous PRN  Artem Pollard MD        sodium phosphate 15 mmol in dextrose 5 % 250 mL IVPB  15 mmol Intravenous PRN Garry Mahan MD        sodium phosphate 20.01 mmol in dextrose 5 % 250 mL IVPB  20.01 mmol Intravenous PRN Garry Mahan MD        sodium phosphate 30 mmol in dextrose 5 % 250 mL IVPB  30 mmol Intravenous PRN Garry Mahan MD        thiamine tablet 100 mg  100 mg Oral Daily Craig Mitchell NP   100 mg at 06/24/19 0836       Time spent on the transfer of patient: 40 minutes      Laura Allen MD  Department of Hospital Medicine  [facility]

## 2019-06-24 NOTE — ASSESSMENT & PLAN NOTE
-- WBC elevated  -- Hx IVDU  -- Blood cx from 6/19 with coagulase negative staph, likely a contaminant.  -- s/p abx (meropenem, rocephin, vanc)  -- Tmax normal. Procal normal.  -- Procal tomorrow.  -- No further abx for now.

## 2019-06-24 NOTE — SUBJECTIVE & OBJECTIVE
Neurologic Chief Complaint: Right ICH    Subjective:     Interval History: Patient is seen for follow-up neurological assessment and treatment recommendations: NAEON, no new complaints      HPI, Past Medical, Family, and Social History remains the same as documented in the initial encounter.     Review of Systems   Constitutional: Negative for fever.   Eyes: Positive for visual disturbance.   Respiratory: Negative for shortness of breath.    Cardiovascular: Negative for chest pain.   Gastrointestinal: Negative for vomiting.   Neurological: Positive for facial asymmetry, speech difficulty and weakness. Negative for headaches.     Scheduled Meds:   amLODIPine  5 mg Oral Daily    folic acid  1 mg Oral Daily    heparin (porcine)  5,000 Units Subcutaneous Q8H    senna-docusate 8.6-50 mg  1 tablet Oral BID    thiamine  100 mg Oral Daily     Continuous Infusions:    PRN Meds:acetaminophen, calcium gluconate IVPB, calcium gluconate IVPB, calcium gluconate IVPB, hydrALAZINE, magnesium sulfate IVPB, magnesium sulfate IVPB, midazolam, ondansetron, potassium chloride 10%, potassium chloride 10%, potassium chloride 10%, potassium, sodium phosphates, potassium, sodium phosphates, potassium, sodium phosphates, sodium chloride 0.9%, sodium chloride 0.9%, sodium phosphate IVPB, sodium phosphate IVPB, sodium phosphate IVPB    Objective:     Vital Signs (Most Recent):  Temp: 98.5 °F (36.9 °C) (06/24/19 1105)  Pulse: 80 (06/24/19 1305)  Resp: (!) 29 (06/24/19 1305)  BP: 106/71 (06/24/19 1205)  SpO2: 100 % (06/24/19 1305)  BP Location: Left arm    Vital Signs Range (Last 24H):  Temp:  [98.2 °F (36.8 °C)-99.2 °F (37.3 °C)]   Pulse:  [53-87]   Resp:  [16-29]   BP: ()/(55-77)   SpO2:  [100 %]   Arterial Line BP: (109-137)/(60-83)   BP Location: Left arm    Physical Exam   Constitutional: He appears well-developed. No distress.   Underweight; multiple small wounds over body   HENT:   Head: Normocephalic and atraumatic.    Cardiovascular: Normal rate.   Pulmonary/Chest: Effort normal. Tachypnea noted. No respiratory distress.   Neurological: He is alert.   Skin: Skin is warm.   Nursing note and vitals reviewed.      Neurological Exam:   LOC: alert  Attention Span: poor  Language: No aphasia  Articulation: Dysarthria  Orientation: Not oriented to place, Not oriented to time  Visual Fields: L hemianopsia  EOM (CN III, IV, VI): Gaze preference  right  Pupils (CN II, III): PERRL  Facial Sensation (CN V): Normal  Facial Movement (CN VII): Lower facial weakness on the Left  Motor: LUE 0/5, RUE 5/5, RLE 5/5; LLE 2/5  Cebellar: No evidence of appendicular or axial ataxia  Sensation: intact bilaterally      Laboratory:  CMP:   Recent Labs   Lab 06/24/19  0024 06/24/19  0848   CALCIUM 9.6  --    ALBUMIN 3.9  --    PROT 7.4  --     138   K 3.7  3.7 3.5   CO2 22*  --      --    BUN 15  --    CREATININE 0.7  --    ALKPHOS 66  --    ALT 49*  --    AST 25  --    BILITOT 1.2*  --      BMP:   Recent Labs   Lab 06/24/19  0024 06/24/19  0848    138   K 3.7  3.7 3.5     --    CO2 22*  --    BUN 15  --    CREATININE 0.7  --    CALCIUM 9.6  --      CBC:   Recent Labs   Lab 06/24/19  0024   WBC 16.80*   RBC 5.22   HGB 15.8   HCT 45.8   *   MCV 88   MCH 30.3   MCHC 34.5     Lipid Panel:   Recent Labs   Lab 06/19/19  1807   CHOL 144   LDLCALC 80.4   HDL 31*   TRIG 163*     Coagulation:   Recent Labs   Lab 06/20/19  0457   INR 1.1   APTT 28.4     Hgb A1C:   Recent Labs   Lab 06/19/19 1807   HGBA1C 5.2     TSH:   Recent Labs   Lab 06/19/19  1807   TSH 1.015       Diagnostic Results     Brain imaging:  MRI 6/20/2019  Large parenchymal hemorrhage centered within the bilateral parietal lobes extending across midline through the splenium of the corpus callosum corresponding to abnormality seen on CT.  There is thin subdural hemorrhage overlying the right cerebral hemisphere with questionable trace hemorrhage also overlying the  left inferior frontal lobe.  Mass effect with approximate 7 mm of leftward midline shift with distortion of the ventricles similar to prior.  Trace prominence of the temporal horns lateral ventricles cannot exclude component of trapped ventricles and early hydrocephalus with remaining ventricular system relatively the normal in caliber.    CT head w/o contrast 6-19-19 results:  1. The study is significantly abnormal.  There is acute intracranial hemorrhage with large parenchymal hemorrhage in the posteromedial right occipital parietal lobe crossing over the splenium of the corpus callosum into the posteromedial left occipital parietal lobe.  There is moderate-to-marked associated edema.  There is approximately 6 mm right to left midline shift.  Subdural blood is also seen along the falx and extending inferiorly along the right tentorium.  The cerebellar tonsils are normal position.  There is compression and depression of the lateral ventricles with moderate to marked dilatation of the temporal horns.     Vessel Imaging:    Angiogram 6/21/2019  Preliminary interpretation: No evidence of aneurysm or AVM. Diffuse irregularity of multiple small and medium sized arteries with multiple areas of narrowing/beading raising question of CNS vasculitis or RCVS,. Please see Imaging report for full details.    MRA/MRV 6/20/2019  MRA head: Unremarkable motion limited MRA of the head specifically without definite abnormal flow related enhancement associated with the region of parenchymal hemorrhage partially included in the study to suggest definite associated vascular malformation.  Correlation and follow up repeat imaging when patient better able to tolerate scanning advised.  Thin subdural hemorrhage overlies the right cerebral hemisphere.  MRV: Motion limited examination as detailed above with dominant right transverse and sigmoid sinuses.  The left sigmoid sinus is not seen and likely obscured by motion.    CTA Head and neck  6-19-19 results:    Acute intraparenchymal hematoma centered within right and left parietal lobes with extension across the corpus callosum.  Volume of hemorrhage is stable to slightly progressed in comparison to prior exam.  Slightly progressed edema and mass effect resulting in 8 cm leftward midline shift.  No new hemorrhage.    Stable dilatation of the temporal horns of lateral ventricles consistent mild hydrocephalus.    Small volume subdural hemorrhage layering along the falx and tentorium, unchanged    No evidence of large intracranial aneurysm or AVM.  Please note evaluation is limited secondary to extensive hemorrhage and mass effect.  Consider cerebral angiography for further evaluation, as clinically indicated.     Cardiac Evaluation:   EKG 6-19-19 results:  Normal sinus rhythm  Biatrial enlargement  Prolonged QT  Abnormal ECG  When compared with ECG of 16-JUN-2019 12:31,  Vent. rate has increased BY 34 BPM  Non-specific change in ST segment in Inferior leads  QT has lengthened

## 2019-06-24 NOTE — ASSESSMENT & PLAN NOTE
"-h/o of IV drug abuse   -large R ICH with IVH on imaging  -on 2% Na gtt  -s/p angiogram-->per radiology, "No evidence of aneurysm or AVM. Diffuse irregularity of multiple small and medium sized arteries with multiple areas of narrowing/beading raising question of CNS vasculitis or RCVS."  -patient is CEC'd--> recommend a psychiatric consult     See hospital course for functional, cognitive/speech/language, and nutrition/swallow status.      Recommendations  -  Encourage mobility, OOB in chair at least 3 hours per day, and early ambulation as appropriate   -  PT/OT evaluate and treat  -  SLP speech and cognitive evaluate and treat  -  Monitor sleep disturbances and establish consistent sleep-wake cycle  -  Monitor for bowel and bladder dysfunction  -  Monitor for shoulder pain, subluxation, & spasticity  -  Monitor for and prevent skin breakdown and pressure ulcers  · Early mobility, repositioning/weight shifting every 20-30 minutes when sitting, turn patient every 2 hours, proper mattress/overlay and chair cushioning, pressure relief/heel protector boots  -  Reviewed discharge options (IP rehab, SNF, HH therapy, and OP therapy)    "

## 2019-06-24 NOTE — ASSESSMENT & PLAN NOTE
WBC 16. Low grade fever  Off abx  No obvious source of infection at this time  Management per primary

## 2019-06-24 NOTE — PT/OT/SLP PROGRESS
"Speech Language Pathology Treatment    Patient Name:  Shahid Lackey   MRN:  [id number]  Admitting Diagnosis: Nontraumatic cortical hemorrhage of cerebral hemisphere    Recommendations:                 General Recommendations:  Dysphagia therapy, Speech/language therapy and Cognitive-linguistic therapy  Diet recommendations:  Dental Soft, Liquid Diet Level: Thin   Aspiration Precautions: 1 bite/sip at a time, Check for pocketing/oral residue, HOB to 90 degrees, No straws and Strict aspiration precautions   General Precautions: Standard, aspiration, fall, dental soft  Communication strategies:  provide increased time to answer and go to room if call light pushed    Subjective     "I'll do anything that will help" (Pt stated)    Pain/Comfort:  · Pain Rating 1: 0/10  · Pain Rating Post-Intervention 1: 0/10    Objective:     Has the patient been evaluated by SLP for swallowing?   Yes  Keep patient NPO? No   Current Respiratory Status:        Pt seen this am with no family present.  Significant left neglect remains evident.  Pt able to identify objects at midline on 50% of trials given max cues.  Pt unable to make eye contact with therapist positioned slightly left of midline despite max cues.  He was also unable to locate communication board to assist with orientation despite max cues.  Pt was oriented to self, place and situation.  He had difficulty with month but carried over year from previously in session.  Pt completed basic categorization on 100% of trials with redirection to task.  He compared and contrasted two given objects with mod cues.  Pt read single words with cues for attention left. Pt was seated upright and observed with sips of water via cup. No overt s/s of aspiration were observed. Nursing reported tolerance of meds.  Education provided regarding ST role, left neglect, swallow precautions, s/s aspiration, cognitive stimulation and plan of care.      Assessment:     Shahid Lackey is a 33 y.o. male " with an SLP diagnosis of Dysphagia, Cognitive-Linguistic Impairment and Visio-Spatial Impairment.      Goals:   Multidisciplinary Problems     SLP Goals        Problem: SLP Goal    Goal Priority Disciplines Outcome   SLP Goal     SLP Ongoing (interventions implemented as appropriate)   Description:  SLP goals to be met by 6/27  1.Pt will tolerate diet of dental soft solids/ thin liquids with no overt signs of aspiration  2. Pt will complete simple L scanning tasks with 80% accuracy with max A.  3. Pt will complete orientation tasks with 90% accuracy and min cues to increase safety awareness  4. Pt will complete problem solving/reasoning tasks with 80% accuracy and min cues to increase safety awareness   5. Pt will participate in ongoing assessment of reading and writing   6. Pt will participate in ongoing assessment of cognitive-linguistic skills                      Plan:     · Patient to be seen:  4 x/week   · Plan of Care expires:  07/19/19  · Plan of Care reviewed with:  patient   · SLP Follow-Up:  Yes       Discharge recommendations:  rehabilitation facility     Time Tracking:     SLP Treatment Date:   06/24/19  Speech Start Time:  1133  Speech Stop Time:  1149     Speech Total Time (min):  16 min    Billable Minutes: Speech Therapy Individual 8 and Treatment Swallowing Dysfunction 8    MART Delarosa, CCC-SLP  Speech Language Pathologist  [phone]  6/24/2019

## 2019-06-24 NOTE — PLAN OF CARE
Problem: Occupational Therapy Goal  Goal: Occupational Therapy Goal  Goals set 6/21 to be addressed for 14 days with expiration date, 7/5:  Patient will increase functional independence with ADLs by performing:    Patient will demonstrate rolling to the right with min assist.  Not met   Patient will demonstrate rolling to the left with min assist.   Not met  Patient will demonstrate supine -sit with mod assist.   Not met  Patient will demonstrate stand pivot transfers with mod assist.   Not met  Patient will demonstrate grooming while seated with min assist.   Not met  Patient will demonstrate upper body dressing with mod assist while seated EOB.   Not met  Patient will demonstrate lower body dressing with mod assist while seated EOB.   Not met  Patient will demonstrate toileting with mod assist.   Not met  Patient will demonstrate bathing while seated EOB with mod assist.   Not met  Patient's family / caregiver will demonstrate independence and safety with assisting patient with self-care skills and functional mobility.     Not met  Patient's family / caregiver will demonstrate independence with providing ROM and changes in bed positioning.   Not met       Goals remain appropriate.  LISA Barrios  6/24/2019

## 2019-06-24 NOTE — ASSESSMENT & PLAN NOTE
32 y/o IV drug abuser with large R ICH with IVH.  MRV negative.  Angiogram completed w/ no signs of AVM or aneurysm but concern for vasculitis. 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       Antithrombotics: None ICH    Statins: None ICH    Aggressive risk factor modification: drug use, smoker     Rehab efforts: The patient has been evaluated by a stroke team provider and the therapy needs have been fully considered based off the presenting complaints and exam findings. The following therapy evaluations are needed: PT evaluate and treat, OT evaluate and treat, SLP evaluate and treat, PM&R evaluate for appropriate placement    Diagnostics ordered/pending: CT for any changes in neurological status, TCD to assess possible vasospasm     VTE prophylaxis: SCDs only large ICH    BP parameters: ICH: SBP <160

## 2019-06-24 NOTE — PROGRESS NOTES
[facility]  Vascular Neurology  Comprehensive Stroke Center  Progress Note    Assessment/Plan:     * Nontraumatic cortical hemorrhage of cerebral hemisphere  34 y/o IV drug abuser with large R ICH with IVH.  MRV negative.  Angiogram completed w/ no signs of AVM or aneurysm but concern for vasculitis. 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       Antithrombotics: None ICH    Statins: None ICH    Aggressive risk factor modification: drug use, smoker     Rehab efforts: The patient has been evaluated by a stroke team provider and the therapy needs have been fully considered based off the presenting complaints and exam findings. The following therapy evaluations are needed: PT evaluate and treat, OT evaluate and treat, SLP evaluate and treat, PM&R evaluate for appropriate placement    Diagnostics ordered/pending: CT for any changes in neurological status, TCD to assess possible vasospasm     VTE prophylaxis: SCDs only large ICH    BP parameters: ICH: SBP <160        Brain compression  Off HTS.   Management per primary     Current smoker  Stroke risk factor  Smoking cessation when appropriate    Vasogenic cerebral edema  Area of vasogenic cerebral edema identified when reviewing brain imaging in the territory of the R middle cerebral artery. There is mass effect associated with it. We will continue to monitor the patients clinical exam for any worsening of symptoms which may indicate expansion of the stroke or the area of the edema resulting in the clinical change. The pattern is suggestive of drug induced vasculopathy etiology         Left hemiparesis  Due to ICH  Aggressive therapy when appropriate    Leukocytosis  WBC 16. Low grade fever  Off abx  No obvious source of infection at this time  Management per primary     Drug abuse, IV  Stroke risk factor  Counseling when appropriate         6/20:  Restless but cooperative. On 2% HTS  6/21: Patient continues on 2% and Cardene gtt.  Following simple commands.   6/22: On 2% gtt. Off cardene gtt.   6/23: TCDs daily, results pending. Of note possible step down tomorrow.  6/24: patient off cardene and HTS, plans for step down    STROKE DOCUMENTATION        NIH Scale:  1a. Level of Consciousness: 0-->Alert, keenly responsive  1b. LOC Questions: 0-->Answers both questions correctly  1c. LOC Commands: 0-->Performs both tasks correctly  2. Best Gaze: 2-->Forced deviation, or total gaze paresis not overcome by the oculocephalic maneuver  3. Visual: 2-->Complete hemianopia  4. Facial Palsy: 1-->Minor paralysis (flattened nasolabial fold, asymmetry on smiling)  5a. Motor Arm, Left: 4-->No movement  5b. Motor Arm, Right: 0-->No drift, limb holds 90 (or 45) degrees for full 10 secs  6a. Motor Leg, Left: 3-->No effort against gravity, leg falls to bed immediately  6b. Motor Leg, Right: 0-->No drift, leg holds 30 degree position for full 5 secs  7. Limb Ataxia: 0-->Absent  8. Sensory: 0-->Normal, no sensory loss  9. Best Language: 0-->No aphasia, normal  10. Dysarthria: 1-->Mild-to-moderate dysarthria, patient slurs at least some words and, at worst, can be understood with some difficulty  11. Extinction and Inattention (formerly Neglect): 0-->No abnormality  Total (NIH Stroke Scale): 13       Modified Brohman Score: 0  [place] Coma Scale:    ABCD2 Score:    [id number] Score:   HAS -BLED Score:   ICH Score:3  Hunt & Dougherty Classification:      Hemorrhagic change of an Ischemic Stroke: Does this patient have an ischemic stroke with hemorrhagic changes? No     Neurologic Chief Complaint: Right ICH    Subjective:     Interval History: Patient is seen for follow-up neurological assessment and treatment recommendations: TARYNON, no new complaints      HPI, Past Medical, Family, and Social History remains the same as documented in the initial encounter.     Review of Systems   Constitutional: Negative for fever.   Eyes: Positive for visual disturbance.   Respiratory:  Negative for shortness of breath.    Cardiovascular: Negative for chest pain.   Gastrointestinal: Negative for vomiting.   Neurological: Positive for facial asymmetry, speech difficulty and weakness. Negative for headaches.     Scheduled Meds:   amLODIPine  5 mg Oral Daily    folic acid  1 mg Oral Daily    heparin (porcine)  5,000 Units Subcutaneous Q8H    senna-docusate 8.6-50 mg  1 tablet Oral BID    thiamine  100 mg Oral Daily     Continuous Infusions:    PRN Meds:acetaminophen, calcium gluconate IVPB, calcium gluconate IVPB, calcium gluconate IVPB, hydrALAZINE, magnesium sulfate IVPB, magnesium sulfate IVPB, midazolam, ondansetron, potassium chloride 10%, potassium chloride 10%, potassium chloride 10%, potassium, sodium phosphates, potassium, sodium phosphates, potassium, sodium phosphates, sodium chloride 0.9%, sodium chloride 0.9%, sodium phosphate IVPB, sodium phosphate IVPB, sodium phosphate IVPB    Objective:     Vital Signs (Most Recent):  Temp: 98.5 °F (36.9 °C) (06/24/19 1105)  Pulse: 80 (06/24/19 1305)  Resp: (!) 29 (06/24/19 1305)  BP: 106/71 (06/24/19 1205)  SpO2: 100 % (06/24/19 1305)  BP Location: Left arm    Vital Signs Range (Last 24H):  Temp:  [98.2 °F (36.8 °C)-99.2 °F (37.3 °C)]   Pulse:  [53-87]   Resp:  [16-29]   BP: ()/(55-77)   SpO2:  [100 %]   Arterial Line BP: (109-137)/(60-83)   BP Location: Left arm    Physical Exam   Constitutional: He appears well-developed. No distress.   Underweight; multiple small wounds over body   HENT:   Head: Normocephalic and atraumatic.   Cardiovascular: Normal rate.   Pulmonary/Chest: Effort normal. Tachypnea noted. No respiratory distress.   Neurological: He is alert.   Skin: Skin is warm.   Nursing note and vitals reviewed.      Neurological Exam:   LOC: alert  Attention Span: poor  Language: No aphasia  Articulation: Dysarthria  Orientation: Not oriented to place, Not oriented to time  Visual Fields: L hemianopsia  EOM (CN III, IV, VI): Gaze  preference  right  Pupils (CN II, III): PERRL  Facial Sensation (CN V): Normal  Facial Movement (CN VII): Lower facial weakness on the Left  Motor: LUE 0/5, RUE 5/5, RLE 5/5; LLE 2/5  Cebellar: No evidence of appendicular or axial ataxia  Sensation: intact bilaterally      Laboratory:  CMP:   Recent Labs   Lab 06/24/19  0024 06/24/19  0848   CALCIUM 9.6  --    ALBUMIN 3.9  --    PROT 7.4  --     138   K 3.7  3.7 3.5   CO2 22*  --      --    BUN 15  --    CREATININE 0.7  --    ALKPHOS 66  --    ALT 49*  --    AST 25  --    BILITOT 1.2*  --      BMP:   Recent Labs   Lab 06/24/19  0024 06/24/19  0848    138   K 3.7  3.7 3.5     --    CO2 22*  --    BUN 15  --    CREATININE 0.7  --    CALCIUM 9.6  --      CBC:   Recent Labs   Lab 06/24/19  0024   WBC 16.80*   RBC 5.22   HGB 15.8   HCT 45.8   *   MCV 88   MCH 30.3   MCHC 34.5     Lipid Panel:   Recent Labs   Lab 06/19/19  1807   CHOL 144   LDLCALC 80.4   HDL 31*   TRIG 163*     Coagulation:   Recent Labs   Lab 06/20/19  0457   INR 1.1   APTT 28.4     Hgb A1C:   Recent Labs   Lab 06/19/19  1807   HGBA1C 5.2     TSH:   Recent Labs   Lab 06/19/19  1807   TSH 1.015       Diagnostic Results     Brain imaging:  MRI 6/20/2019  Large parenchymal hemorrhage centered within the bilateral parietal lobes extending across midline through the splenium of the corpus callosum corresponding to abnormality seen on CT.  There is thin subdural hemorrhage overlying the right cerebral hemisphere with questionable trace hemorrhage also overlying the left inferior frontal lobe.  Mass effect with approximate 7 mm of leftward midline shift with distortion of the ventricles similar to prior.  Trace prominence of the temporal horns lateral ventricles cannot exclude component of trapped ventricles and early hydrocephalus with remaining ventricular system relatively the normal in caliber.    CT head w/o contrast 6-19-19 results:  1. The study is significantly  abnormal.  There is acute intracranial hemorrhage with large parenchymal hemorrhage in the posteromedial right occipital parietal lobe crossing over the splenium of the corpus callosum into the posteromedial left occipital parietal lobe.  There is moderate-to-marked associated edema.  There is approximately 6 mm right to left midline shift.  Subdural blood is also seen along the falx and extending inferiorly along the right tentorium.  The cerebellar tonsils are normal position.  There is compression and depression of the lateral ventricles with moderate to marked dilatation of the temporal horns.     Vessel Imaging:    Angiogram 6/21/2019  Preliminary interpretation: No evidence of aneurysm or AVM. Diffuse irregularity of multiple small and medium sized arteries with multiple areas of narrowing/beading raising question of CNS vasculitis or RCVS,. Please see Imaging report for full details.    MRA/MRV 6/20/2019  MRA head: Unremarkable motion limited MRA of the head specifically without definite abnormal flow related enhancement associated with the region of parenchymal hemorrhage partially included in the study to suggest definite associated vascular malformation.  Correlation and follow up repeat imaging when patient better able to tolerate scanning advised.  Thin subdural hemorrhage overlies the right cerebral hemisphere.  MRV: Motion limited examination as detailed above with dominant right transverse and sigmoid sinuses.  The left sigmoid sinus is not seen and likely obscured by motion.    CTA Head and neck 6-19-19 results:    Acute intraparenchymal hematoma centered within right and left parietal lobes with extension across the corpus callosum.  Volume of hemorrhage is stable to slightly progressed in comparison to prior exam.  Slightly progressed edema and mass effect resulting in 8 cm leftward midline shift.  No new hemorrhage.    Stable dilatation of the temporal horns of lateral ventricles consistent mild  hydrocephalus.    Small volume subdural hemorrhage layering along the falx and tentorium, unchanged    No evidence of large intracranial aneurysm or AVM.  Please note evaluation is limited secondary to extensive hemorrhage and mass effect.  Consider cerebral angiography for further evaluation, as clinically indicated.     Cardiac Evaluation:   EKG 6-19-19 results:  Normal sinus rhythm  Biatrial enlargement  Prolonged QT  Abnormal ECG  When compared with ECG of 16-JUN-2019 12:31,  Vent. rate has increased BY 34 BPM  Non-specific change in ST segment in Inferior leads  QT has lengthened          Jelly Santana PA-C  Comprehensive Stroke Center  Department of Vascular Neurology   [facility]

## 2019-06-24 NOTE — ASSESSMENT & PLAN NOTE
-- Utox positive for THC, amphetamines, cocaine, benzo and opiates on admit  -- TTE with no evidence of endocarditis.  -- UE lower and upper with no evidence of DVT.

## 2019-06-24 NOTE — SUBJECTIVE & OBJECTIVE
Interval History 6/24/2019:  Patient is seen for follow-up rehab evaluation and recommendations: Participating with therapy.    HPI, Past Medical, Family, and Social History remains the same as documented in the initial encounter.    Scheduled Medications:    amLODIPine  5 mg Oral Daily    folic acid  1 mg Oral Daily    heparin (porcine)  5,000 Units Subcutaneous Q8H    senna-docusate 8.6-50 mg  1 tablet Oral BID    thiamine  100 mg Oral Daily       Diagnostic Results: Labs: Reviewed    PRN Medications: acetaminophen, calcium gluconate IVPB, calcium gluconate IVPB, calcium gluconate IVPB, hydrALAZINE, magnesium sulfate IVPB, magnesium sulfate IVPB, midazolam, ondansetron, potassium chloride 10%, potassium chloride 10%, potassium chloride 10%, potassium, sodium phosphates, potassium, sodium phosphates, potassium, sodium phosphates, sodium chloride 0.9%, sodium chloride 0.9%, sodium phosphate IVPB, sodium phosphate IVPB, sodium phosphate IVPB    Review of Systems   Reason unable to perform ROS: cognitive impairment.     Objective:     Vital Signs (Most Recent):  Temp: 98.3 °F (36.8 °C) (06/24/19 0705)  Pulse: 69 (06/24/19 0705)  Resp: 16 (06/24/19 0705)  BP: (!) 157/67 (06/24/19 0705)  SpO2: 100 % (06/24/19 0705)    Vital Signs (24h Range):  Temp:  [98.2 °F (36.8 °C)-99.3 °F (37.4 °C)] 98.3 °F (36.8 °C)  Pulse:  [53-78] 69  Resp:  [16-29] 16  SpO2:  [100 %] 100 %  BP: ()/(55-77) 157/67  Arterial Line BP: (112-143)/(60-91) 112/62     Physical Exam   Constitutional: He appears well-developed and well-nourished.   HENT:   Head: Normocephalic and atraumatic.   Eyes: Right eye exhibits no discharge. Left eye exhibits no discharge.   Neck: Neck supple.   Cardiovascular: Normal rate and intact distal pulses.   Pulmonary/Chest: Effort normal. No respiratory distress.   Abdominal: Soft. He exhibits no distension.   Musculoskeletal: He exhibits no edema or deformity.   L sided weakness      Neurological: He is alert.  He is disoriented.   Facial asymmetry   L hemianopsia    Skin: Skin is warm and dry.   Abrasions    Psychiatric: His behavior is normal. Cognition and memory are impaired.

## 2019-06-24 NOTE — PROGRESS NOTES
[facility]  Physical Medicine & Rehab  Progress Note    Patient Name: Shahid Lackey  MRN: [id number]  Admission Date: 6/19/2019  Length of Stay: 5 days  Attending Physician: Lonnie Giraldo MD    Subjective:     Principal Problem:Nontraumatic cortical hemorrhage of cerebral hemisphere    Hospital Course:   06/20/2019: PT- Bed mobility Max-TA x 2 ppl. Passed bedside swallow evaluation.  SLP recommending dental soft diet and thin liquids.SLP diagnosis of Cognitive-Linguistic Impairment and Visio-Spatial Impairment.  OT evaluation pending.   6/21/19:  Bed mobility Max-TA x 2 ppl.  Sat EOB 5 mins MaxA. No gait.  ADLs TA.    Interval History 6/24/2019:  Patient is seen for follow-up rehab evaluation and recommendations: Participating with therapy.    HPI, Past Medical, Family, and Social History remains the same as documented in the initial encounter.    Scheduled Medications:    amLODIPine  5 mg Oral Daily    folic acid  1 mg Oral Daily    heparin (porcine)  5,000 Units Subcutaneous Q8H    senna-docusate 8.6-50 mg  1 tablet Oral BID    thiamine  100 mg Oral Daily       Diagnostic Results: Labs: Reviewed    PRN Medications: acetaminophen, calcium gluconate IVPB, calcium gluconate IVPB, calcium gluconate IVPB, hydrALAZINE, magnesium sulfate IVPB, magnesium sulfate IVPB, midazolam, ondansetron, potassium chloride 10%, potassium chloride 10%, potassium chloride 10%, potassium, sodium phosphates, potassium, sodium phosphates, potassium, sodium phosphates, sodium chloride 0.9%, sodium chloride 0.9%, sodium phosphate IVPB, sodium phosphate IVPB, sodium phosphate IVPB    Review of Systems   Reason unable to perform ROS: cognitive impairment.     Objective:     Vital Signs (Most Recent):  Temp: 98.3 °F (36.8 °C) (06/24/19 0705)  Pulse: 69 (06/24/19 0705)  Resp: 16 (06/24/19 0705)  BP: (!) 157/67 (06/24/19 0705)  SpO2: 100 % (06/24/19 0705)    Vital Signs (24h Range):  Temp:  [98.2 °F (36.8 °C)-99.3 °F  "(37.4 °C)] 98.3 °F (36.8 °C)  Pulse:  [53-78] 69  Resp:  [16-29] 16  SpO2:  [100 %] 100 %  BP: ()/(55-77) 157/67  Arterial Line BP: (112-143)/(60-91) 112/62     Physical Exam   Constitutional: He appears well-developed and well-nourished.   HENT:   Head: Normocephalic and atraumatic.   Eyes: Right eye exhibits no discharge. Left eye exhibits no discharge.   Neck: Neck supple.   Cardiovascular: Normal rate and intact distal pulses.   Pulmonary/Chest: Effort normal. No respiratory distress.   Abdominal: Soft. He exhibits no distension.   Musculoskeletal: He exhibits no edema or deformity.   L sided weakness   Neurological: He is alert. He is disoriented.   Facial asymmetry   L hemianopsia    Skin: Skin is warm and dry.   Abrasions    Psychiatric: His behavior is normal. Cognition and memory are impaired.     Assessment/Plan:      * Nontraumatic cortical hemorrhage of cerebral hemisphere  -h/o of IV drug abuse   -large R ICH with IVH on imaging  -on 2% Na gtt d/c'd  -s/p angiogram-->per radiology, "No evidence of aneurysm or AVM. Diffuse irregularity of multiple small and medium sized arteries with multiple areas of narrowing/beading raising question of CNS vasculitis or RCVS."  -patient is CEC'd--> recommend a psychiatric consult     See hospital course for functional, cognitive/speech/language, and nutrition/swallow status.      Recommendations  -  Encourage mobility, OOB in chair at least 3 hours per day, and early ambulation as appropriate   -  PT/OT evaluate and treat  -  SLP speech and cognitive evaluate and treat  -  Monitor sleep disturbances and establish consistent sleep-wake cycle  -  Monitor for bowel and bladder dysfunction  -  Monitor for shoulder pain, subluxation, & spasticity  -  Monitor for and prevent skin breakdown and pressure ulcers  · Early mobility, repositioning/weight shifting every 20-30 minutes when sitting, turn patient every 2 hours, proper mattress/overlay and chair cushioning, " pressure relief/heel protector boots  -  Reviewed discharge options (IP rehab, SNF, HH therapy, and OP therapy)      Left hemiparesis  -PT/OT evaluate and treat    Drug abuse, IV  -h/o drug abuse  -drug screen positive for cocaine, heroin, THC, amphetamines and benzo's    Recommend Psychiatric consult. Patient is CEC'd per RN. Will follow at a distance for therapy progress and medical stability.      Felicia Marrero NP  Department of Physical Medicine & Rehab   [facility]

## 2019-06-24 NOTE — SUBJECTIVE & OBJECTIVE
"       Medications:  Continuous Infusions:    Scheduled Meds:   amLODIPine  5 mg Oral Daily    folic acid  1 mg Oral Daily    heparin (porcine)  5,000 Units Subcutaneous Q8H    senna-docusate 8.6-50 mg  1 tablet Oral BID    thiamine  100 mg Oral Daily     PRN Meds:acetaminophen, calcium gluconate IVPB, calcium gluconate IVPB, calcium gluconate IVPB, hydrALAZINE, magnesium sulfate IVPB, magnesium sulfate IVPB, midazolam, ondansetron, potassium chloride 10%, potassium chloride 10%, potassium chloride 10%, potassium, sodium phosphates, potassium, sodium phosphates, potassium, sodium phosphates, sodium chloride 0.9%, sodium chloride 0.9%, sodium phosphate IVPB, sodium phosphate IVPB, sodium phosphate IVPB     Review of Systems    Objective:     Weight: 59.4 kg (131 lb)  Body mass index is 18.8 kg/m².  Vital Signs (Most Recent):  Temp: 98.3 °F (36.8 °C) (06/24/19 0705)  Pulse: 69 (06/24/19 0705)  Resp: 16 (06/24/19 0705)  BP: (!) 157/67 (06/24/19 0705)  SpO2: 100 % (06/24/19 0705) Vital Signs (24h Range):  Temp:  [98.2 °F (36.8 °C)-99.3 °F (37.4 °C)] 98.3 °F (36.8 °C)  Pulse:  [53-78] 69  Resp:  [16-30] 16  SpO2:  [100 %] 100 %  BP: ()/(55-77) 157/67  Arterial Line BP: (107-143)/(60-91) 112/62     Date 06/24/19 0700 - 06/25/19 0659   Shift 5476-1094 8227-1026 9888-1299 24 Hour Total   INTAKE   I.V.(mL/kg) 10(0.2)   10(0.2)   Shift Total(mL/kg) 10(0.2)   10(0.2)   OUTPUT   Shift Total(mL/kg)       Weight (kg) 59.4 59.4 59.4 59.4            Urethral Catheter 06/15/19 2005 Latex 16 Fr. (Active)   Site Assessment Clean;Intact 6/20/2019  4:05 AM   Collection Container Urimeter 6/20/2019  4:05 AM   Securement Method secured to top of thigh w/ adhesive device 6/20/2019  4:05 AM   Catheter Care Performed yes 6/20/2019  4:05 AM   Reason for Continuing Urinary Catheterization Critically ill in ICU requiring intensive monitoring 6/20/2019  4:05 AM   CAUTI Prevention Bundle StatLock in place w 1" slack;Intact seal between " catheter & drainage tubing;Drainage bag off the floor;Green sheeting clip in use;No dependent loops or kinks;Drainage bag not overfilled (<2/3 full);Drainage bag below bladder 6/20/2019  4:05 AM   Output (mL) 200 mL 6/20/2019 10:00 AM            Urethral Catheter (Active)       Neurosurgery Physical Exam    Head: normocephalic, atraumatic  Neurologic:   GCS: Eyes 3; easily arousable. Verbal 4, Motor 6  Mental Status: awake, however not opening eyes unless stimulated, but able to participate in converation  Oriented to self but not place (knows is hospital) and not year.   Cranial nerves: PERRL, R sided gaze preference.   RUE and RLE following commands, no movement of the left upper extremity. LLE localizes  DTR: 2+ symmetrically throughout.  Pulmonary: normal respirations, no signs of respiratory distress     Significant Labs:  Recent Labs   Lab 06/23/19 0221 06/23/19  1616 06/23/19  1801 06/24/19  0024 06/24/19  0848     --   --   --  104  --       < >  --  137 138 138   K 3.5  --  3.6  --  3.7  3.7 3.5     --   --   --  106  --    CO2 23  --   --   --  22*  --    BUN 15  --   --   --  15  --    CREATININE 0.7  --   --   --  0.7  --    CALCIUM 9.2  --   --   --  9.6  --    MG 2.2  --   --   --  2.2  --     < > = values in this interval not displayed.     Recent Labs   Lab 06/23/19 0221 06/24/19  0024   WBC 16.43* 16.80*   HGB 14.3 15.8   HCT 42.0 45.8    362*     No results for input(s): LABPT, INR, APTT in the last 48 hours.  Microbiology Results (last 7 days)     Procedure Component Value Units Date/Time    Culture, Respiratory with Gram Stain [[id number]] Collected:  06/22/19 0646    Order Status:  Completed Specimen:  Respiratory from Sputum Updated:  06/24/19 0931     Respiratory Culture Normal respiratory yasir     Respiratory Culture No S aureus or Pseudomonas isolated.     Gram Stain (Respiratory) <10 epithelial cells per low power field.     Gram Stain (Respiratory) Few WBC's      Gram Stain (Respiratory) Rare yeast    Narrative:       Mini-BAL.    Blood culture [[id number]] Collected:  06/22/19 0643    Order Status:  Completed Specimen:  Blood from Peripheral, Foot, Left Updated:  06/24/19 0822     Blood Culture, Routine No Growth to date     Blood Culture, Routine No Growth to date     Blood Culture, Routine No Growth to date    Narrative:       Blood cultures x 2 different sites. 4 bottles total. Please  draw cultures before administering antibiotics.    Blood culture [[id number]] Collected:  06/22/19 0633    Order Status:  Completed Specimen:  Blood from Peripheral, Ankle, Right Updated:  06/24/19 0822     Blood Culture, Routine No Growth to date     Blood Culture, Routine No Growth to date     Blood Culture, Routine No Growth to date    Narrative:       Blood cultures from 2 different sites. 4 bottles total.  Please draw before starting antibiotics.    Blood culture [[id number]]     Order Status:  Canceled Specimen:  Blood     Blood culture [[id number]]     Order Status:  Canceled Specimen:  Blood     Culture, Respiratory with Gram Stain [[id number]]     Order Status:  No result Specimen:  Respiratory         CMP:   Recent Labs   Lab 06/23/19  0221  06/23/19  1616 06/23/19  1801 06/24/19  0024 06/24/19  0848     --   --   --  104  --    CALCIUM 9.2  --   --   --  9.6  --    ALBUMIN 3.7  --   --   --  3.9  --    PROT 7.0  --   --   --  7.4  --       < >  --  137 138 138   K 3.5  --  3.6  --  3.7  3.7 3.5   CO2 23  --   --   --  22*  --      --   --   --  106  --    BUN 15  --   --   --  15  --    CREATININE 0.7  --   --   --  0.7  --    ALKPHOS 59  --   --   --  66  --    ALT 52*  --   --   --  49*  --    AST 33  --   --   --  25  --    BILITOT 1.1*  --   --   --  1.2*  --     < > = values in this interval not displayed.     CRP: No results for input(s): CRP in the last 48 hours.  ESR: No results for input(s): POCESR, ERYTHROCYTES in the last 48 hours.  LFTs:   Recent  Labs   Lab 06/23/19  0221 06/24/19  0024   ALT 52* 49*   AST 33 25   ALKPHOS 59 66   BILITOT 1.1* 1.2*   PROT 7.0 7.4   ALBUMIN 3.7 3.9       Significant Diagnostics:  Reviewed

## 2019-06-24 NOTE — PLAN OF CARE
Problem: SLP Goal  Goal: SLP Goal  SLP goals to be met by 6/27  1.Pt will tolerate diet of dental soft solids/ thin liquids with no overt signs of aspiration  2. Pt will complete simple L scanning tasks with 80% accuracy with max A.  3. Pt will complete orientation tasks with 90% accuracy and min cues to increase safety awareness  4. Pt will complete problem solving/reasoning tasks with 80% accuracy and min cues to increase safety awareness   5. Pt will participate in ongoing assessment of reading and writing   6. Pt will participate in ongoing assessment of cognitive-linguistic skills       Continue current ST poc.     MART Delarosa, CCC-SLP  Speech Language Pathologist  [phone]  6/24/2019

## 2019-06-24 NOTE — SUBJECTIVE & OBJECTIVE
Interval History:  TCD with slight increase in LG ratio. Started on sc heparin.       Review of Systems   Constitutional: Negative for chills and fever.   HENT: Negative for trouble swallowing.    Eyes: Negative for visual disturbance.   Respiratory: Negative for shortness of breath.    Cardiovascular: Negative for chest pain.   Gastrointestinal: Negative for abdominal pain and constipation.   Genitourinary: Negative for difficulty urinating.   Neurological: Positive for weakness. Negative for facial asymmetry, numbness and headaches.   Psychiatric/Behavioral: Negative for agitation and confusion.       Objective:     Vitals:  Temp: 98.5 °F (36.9 °C)  Pulse: 80  Rhythm: normal sinus rhythm  BP: 106/71  MAP (mmHg): 84  Resp: (!) 29  SpO2: 100 %  O2 Device (Oxygen Therapy): room air    Temp  Min: 98.2 °F (36.8 °C)  Max: 99.2 °F (37.3 °C)  Pulse  Min: 53  Max: 87  BP  Min: 92/59  Max: 157/67  MAP (mmHg)  Min: 70  Max: 96  Resp  Min: 16  Max: 29  SpO2  Min: 100 %  Max: 100 %    06/23 0701 - 06/24 0700  In: 575 [P.O.:460; I.V.:115]  Out: 2100 [Urine:2100]   Unmeasured Output  Stool Occurrence: 1       Physical Exam   Constitutional: He is oriented to person, place, and time. No distress.   Eyes: Pupils are equal, round, and reactive to light. EOM are normal.   Cardiovascular: Normal rate.   Pulmonary/Chest: Effort normal.   Abdominal: Soft. Bowel sounds are normal.   Musculoskeletal: He exhibits no edema.   Neurological: He is alert and oriented to person, place, and time.     Mental Status:  Alert and Oriented x3  Follows commands.  Speech normal.  Brainstem intact  CN II-XII intact.  Motor:  RUE - 5/5   RLE - 4+/5  LUE - 0/5  LLE - 0/5  Sensory: Normal  Proprioception: Not tested  Coordination: Not tested  Gait: Not tested.       Nursing note and vitals reviewed.      Medications:  Continuous Scheduled  amLODIPine 5 mg Daily   folic acid 1 mg Daily   heparin (porcine) 5,000 Units Q8H   senna-docusate 8.6-50 mg 1 tablet  BID   thiamine 100 mg Daily   PRN  acetaminophen 650 mg Q6H PRN   calcium gluconate IVPB 1 g PRN   calcium gluconate IVPB 1 g PRN   calcium gluconate IVPB 1 g PRN   hydrALAZINE 10 mg Q6H PRN   magnesium sulfate IVPB 2 g PRN   magnesium sulfate IVPB 4 g PRN   midazolam 1 mg Q5 Min PRN   ondansetron 4 mg Q8H PRN   potassium chloride 10% 40 mEq PRN   potassium chloride 10% 40 mEq PRN   potassium chloride 10% 60 mEq PRN   potassium, sodium phosphates 2 packet PRN   potassium, sodium phosphates 2 packet PRN   potassium, sodium phosphates 2 packet PRN   sodium chloride 0.9% 10 mL PRN   sodium chloride 0.9% 10 mL PRN   sodium phosphate IVPB 15 mmol PRN   sodium phosphate IVPB 20.01 mmol PRN   sodium phosphate IVPB 30 mmol PRN     Today I personally reviewed pertinent medications, lines/drains/airways, imaging, cardiology results, laboratory results, notably:    Diet  Diet Dysphagia Soft (IDDSI Level 6) Thin  Diet Dysphagia Soft (IDDSI Level 6) Thin

## 2019-06-24 NOTE — PLAN OF CARE
Problem: Adult Inpatient Plan of Care  Goal: Plan of Care Review  POC reviewed with pt at 0330. Pt verbalized understanding. No acute events overnight. Pt had not episodes of agitation and seems to be in a better mood. Pt progressing towards goals.  Will continue to monitor. See flowsheets for full assessment and VS info

## 2019-06-24 NOTE — PT/OT/SLP PROGRESS
"Occupational Therapy   Treatment    Name: Shahid Lackey  MRN: [id number]  Admitting Diagnosis:  Nontraumatic cortical hemorrhage of cerebral hemisphere       Recommendations:     Discharge Recommendations: rehabilitation facility  Discharge Equipment Recommendations:  tub bench, wheelchair, manual  Barriers to discharge:  Inaccessible home environment, Decreased caregiver support    Assessment:     Shahid Lackey is a 33 y.o. male with a medical diagnosis of Nontraumatic cortical hemorrhage of cerebral hemisphere.  He presents with performance deficits affecting function are weakness, impaired endurance, impaired functional mobilty, impaired self care skills, impaired balance, decreased coordination, decreased upper extremity function, impaired sensation, gait instability, impaired cognition, decreased lower extremity function, abnormal tone, impaired fine motor, impaired coordination, decreased safety awareness, decreased ROM.     Rehab Prognosis:  Good; patient would benefit from acute skilled OT services to address these deficits and reach maximum level of function.       Plan:     Patient to be seen 4 x/week to address the above listed problems via self-care/home management, neuromuscular re-education, cognitive retraining, therapeutic activities, therapeutic exercises, sensory integration  · Plan of Care Expires: 06/24/19  · Plan of Care Reviewed with: patient    Subjective   Patient:  "I think you need to check into the on demand.  My name?"  Nurse:  "He was up all night and maybe went to sleep around 4:30 am."  Pain/Comfort:  · Pain Rating 1: 0/10  · Pain Rating Post-Intervention 1: 0/10    Objective:     Communicated with: Nurse prior to session.  Patient found supine with arterial line, bed alarm, blood pressure cuff, pulse ox (continuous), SCD, telemetry, pressure relief boots, peripheral IV upon OT entry to room.  Family not present.  Sitter present (pt. PEC)    General Precautions: Standard, " aspiration, fall   Orthopedic Precautions:N/A   Braces: N/A     Occupational Performance:     Bed Mobility:    · Patient completed Rolling/Turning to Left with  total assistance  · Patient completed Rolling/Turning to Right with total assistance     Activities of Daily Living:  · Grooming: total assistance while seated upright in bed    [facility] 6 Click ADL: 6    Treatment & Education:   AAROM performed bilateral UE/LEs one set x 10 rep in all planes of motion with stretches provided at end range; sustained stretch provided for left UE external rotation and shoulder flexion; assistance and facilitation provided for upward rotation of the scapula during shoulder flexion and abduction.  Positioning provided for midline orientation with bilateral UEs elevated and heels lifted off mattress.  Gentle cervical rotation provided to the left.   Family not present during the session.  Patient's functional status and disposition recommendation discussed with patient and nurse.  White board updated in patient's room.     Patient left supine with all lines intact and call button in reachEducation:      GOALS:   Multidisciplinary Problems     Occupational Therapy Goals        Problem: Occupational Therapy Goal    Goal Priority Disciplines Outcome Interventions   Occupational Therapy Goal     OT, PT/OT     Description:  Goals set 6/21 to be addressed for 14 days with expiration date, 7/5:  Patient will increase functional independence with ADLs by performing:    Patient will demonstrate rolling to the right with min assist.  Not met   Patient will demonstrate rolling to the left with min assist.   Not met  Patient will demonstrate supine -sit with mod assist.   Not met  Patient will demonstrate stand pivot transfers with mod assist.   Not met  Patient will demonstrate grooming while seated with min assist.   Not met  Patient will demonstrate upper body dressing with mod assist while seated EOB.   Not met  Patient will demonstrate  lower body dressing with mod assist while seated EOB.   Not met  Patient will demonstrate toileting with mod assist.   Not met  Patient will demonstrate bathing while seated EOB with mod assist.   Not met  Patient's family / caregiver will demonstrate independence and safety with assisting patient with self-care skills and functional mobility.     Not met  Patient's family / caregiver will demonstrate independence with providing ROM and changes in bed positioning.   Not met                        Time Tracking:     OT Date of Treatment: 06/24/19  OT Start Time: 0536  OT Stop Time: 0548  OT Total Time (min): 12 min    Billable Minutes:Therapeutic Exercise 12    LISA Barrios  6/24/2019

## 2019-06-24 NOTE — ASSESSMENT & PLAN NOTE
33 yr old with a large bilateral parieto-occipital hemorrhage which is larger on the right and crosses over to the left across the splenium with IV extension. And midline shift.  Utox positive for THC, cocaine, meth and opiates    -- Admitted to [facility].  -- NSGY following, appreciate recs.  -- S/p Angiogram on 6/20 which showed irregularity of multiple medium and small size vessels particularly in the parietooccipital regions with multifocal narrowing and beading. Differential includes drug-induced vasculitis vs RCVS  -- Off HTS.   -- SBP<150. Amlodipine 5mg daily.  -- Keppra 500mg q12  -- Restart sc heparin.  -- TCD show increase in lindegaard ratio (<6 on both sides)  -- Neuro checks q1  -- TTE unremarkable.    Step down to VN.

## 2019-06-24 NOTE — PLAN OF CARE
Problem: Adult Inpatient Plan of Care  Goal: Patient-Specific Goal (Individualization)  Admit Date:     Admit Dx:    Past Medical History:  No date: Current smoker  No date: Drug abuse, IV    Past Surgical History:  6/20/2019: INSERT ARTERIAL LINE      Comment:       Individualization:   1.     Restraints: 6/22/19 for pulling lines       Outcome: Ongoing (interventions implemented as appropriate)  POC reviewed with pt and family at 1400. Pt verbalized understanding. Questions and concerns addressed. No acute events today. Pt progressing toward goals. Will continue to monitor. Pt has orders to transfer to the floor. See flowsheets for full assessment and VS info.

## 2019-06-24 NOTE — PROGRESS NOTES
[facility]  Neurocritical Care  Progress Note    Admit Date: 6/19/2019  Service Date: 06/24/2019  Length of Stay: 5    Subjective:     Chief Complaint: Nontraumatic cortical hemorrhage of cerebral hemisphere    History of Present Illness: Pt is a 33 y.o. Male with Hx of IVDU who presents for  large right IPH with IVH and midline shift.   Pt was at [facility] and was admitted for polysubstance abuse and was +for cocaine, heroin, THC, amphetamines. Pt was subsequently PEC'd at [facility]. Today he stopped moving his Left side. CTH was done at [facility] and showed large IPH with IVH. Pt was transferred to [facility] for monitoring in ICU, Neurosurgery consulted.       Hospital Course: 6/19/2019 Admit to [facility] for ICH  6/20: stable overnight, no signs of withdrawal, sbp within parameters on cardene, vasc neurology consulted  06/24/2019 Started on sc heparin. Stable to step down to VN.    Interval History:  TCD with slight increase in LG ratio. Started on sc heparin.       Review of Systems   Constitutional: Negative for chills and fever.   HENT: Negative for trouble swallowing.    Eyes: Negative for visual disturbance.   Respiratory: Negative for shortness of breath.    Cardiovascular: Negative for chest pain.   Gastrointestinal: Negative for abdominal pain and constipation.   Genitourinary: Negative for difficulty urinating.   Neurological: Positive for weakness. Negative for facial asymmetry, numbness and headaches.   Psychiatric/Behavioral: Negative for agitation and confusion.       Objective:     Vitals:  Temp: 98.5 °F (36.9 °C)  Pulse: 80  Rhythm: normal sinus rhythm  BP: 106/71  MAP (mmHg): 84  Resp: (!) 29  SpO2: 100 %  O2 Device (Oxygen Therapy): room air    Temp  Min: 98.2 °F (36.8 °C)  Max: 99.2 °F (37.3 °C)  Pulse  Min: 53  Max: 87  BP  Min: 92/59  Max: 157/67  MAP (mmHg)  Min: 70  Max: 96  Resp  Min: 16  Max: 29  SpO2  Min: 100 %  Max: 100 %    06/23 0701 - 06/24 0700  In: 575  [P.O.:460; I.V.:115]  Out: 2100 [Urine:2100]   Unmeasured Output  Stool Occurrence: 1       Physical Exam   Constitutional: He is oriented to person, place, and time. No distress.   Eyes: Pupils are equal, round, and reactive to light. EOM are normal.   Cardiovascular: Normal rate.   Pulmonary/Chest: Effort normal.   Abdominal: Soft. Bowel sounds are normal.   Musculoskeletal: He exhibits no edema.   Neurological: He is alert and oriented to person, place, and time.     Mental Status:  Alert and Oriented x3  Follows commands.  Speech normal.  Brainstem intact  CN II-XII intact.  Motor:  RUE - 5/5   RLE - 4+/5  LUE - 0/5  LLE - 0/5  Sensory: Normal  Proprioception: Not tested  Coordination: Not tested  Gait: Not tested.       Nursing note and vitals reviewed.      Medications:  Continuous Scheduled  amLODIPine 5 mg Daily   folic acid 1 mg Daily   heparin (porcine) 5,000 Units Q8H   senna-docusate 8.6-50 mg 1 tablet BID   thiamine 100 mg Daily   PRN  acetaminophen 650 mg Q6H PRN   calcium gluconate IVPB 1 g PRN   calcium gluconate IVPB 1 g PRN   calcium gluconate IVPB 1 g PRN   hydrALAZINE 10 mg Q6H PRN   magnesium sulfate IVPB 2 g PRN   magnesium sulfate IVPB 4 g PRN   midazolam 1 mg Q5 Min PRN   ondansetron 4 mg Q8H PRN   potassium chloride 10% 40 mEq PRN   potassium chloride 10% 40 mEq PRN   potassium chloride 10% 60 mEq PRN   potassium, sodium phosphates 2 packet PRN   potassium, sodium phosphates 2 packet PRN   potassium, sodium phosphates 2 packet PRN   sodium chloride 0.9% 10 mL PRN   sodium chloride 0.9% 10 mL PRN   sodium phosphate IVPB 15 mmol PRN   sodium phosphate IVPB 20.01 mmol PRN   sodium phosphate IVPB 30 mmol PRN     Today I personally reviewed pertinent medications, lines/drains/airways, imaging, cardiology results, laboratory results, notably:    Diet  Diet Dysphagia Soft (IDDSI Level 6) Thin  Diet Dysphagia Soft (IDDSI Level 6) Thin      Assessment/Plan:     Neuro  * Nontraumatic cortical  hemorrhage of cerebral hemisphere  33 yr old with a large bilateral parieto-occipital hemorrhage which is larger on the right and crosses over to the left across the splenium with IV extension. And midline shift.  Utox positive for THC, cocaine, meth and opiates    -- Admitted to [facility].  -- NSGY following, appreciate recs.  -- S/p Angiogram on 6/20 which showed irregularity of multiple medium and small size vessels particularly in the parietooccipital regions with multifocal narrowing and beading. Differential includes drug-induced vasculitis vs RCVS  -- Off HTS.   -- SBP<150. Amlodipine 5mg daily.  -- Keppra 500mg q12  -- Restart sc heparin.  -- TCD show increase in lindegaard ratio (<6 on both sides)  -- Neuro checks q1  -- TTE unremarkable.    Step down to VN.    Vasogenic cerebral edema  -- Mass effect and midline shift 2/2 ICH  -- Was on HTS initially, currently off it.    Left hemiparesis  -- L sided hemiplegia 2/2 to ICH    Psychiatric  Drug abuse, IV  -- Utox positive for THC, amphetamines, cocaine, benzo and opiates on admit  -- TTE with no evidence of endocarditis.  -- UE lower and upper with no evidence of DVT.    Oncology  Leukocytosis  -- WBC elevated  -- Hx IVDU  -- Blood cx from 6/19 with coagulase negative staph, likely a contaminant.  -- s/p abx (meropenem, rocephin, vanc)  -- Tmax normal. Procal normal.  -- Procal tomorrow.  -- No further abx for now.        GI  Transaminitis  -- Elevated on admit  -- Hx of Hepatitis C    Orthopedic  Rhabdomyolysis  -- CPK trending down.  -- Off IVF           The patient is being Prophylaxed for:  Venous Thromboembolism with: Chemical  Stress Ulcer with: None  Ventilator Pneumonia with: none    Activity Orders          Straight Cath starting at 06/23 1858    Diet Dysphagia Soft (IDDSI Level 6) Thin: Dysphagia 3 (Mechanical Soft Chopped) starting at 06/20 0911        Full Code    Deejay Navarro MD  Neurocritical Care  [facility]

## 2019-06-25 NOTE — SUBJECTIVE & OBJECTIVE
Patient History           Medical as of 6/25/2019     Past Medical History     Diagnosis Date Comments Source    Current smoker -- -- Provider    Drug abuse, IV -- -- Provider                  Surgical as of 6/25/2019     Past Surgical History     Procedure Laterality Date Comments Source    INSERT ARTERIAL LINE -- 6/20/2019   Provider                  Family as of 6/25/2019     Problem Relation Name Age of Onset Comments Source    No Known Problems Mother -- -- -- Provider    No Known Problems Father -- -- -- Provider            Tobacco Use as of 6/25/2019     Smoking Status Smoking Start Date Smoking Quit Date Packs/Day Years Used    Current Every Day Smoker -- -- 1.00 --    Types Comments Smokeless Tobacco Status Smokeless Tobacco Quit Date Source    -- -- Never Used -- Provider            Alcohol Use as of 6/25/2019     Alcohol Use Drinks/Week Alcohol/Week Comments Source    No -- -- -- Provider    Frequency Standard Drinks Binge Drinking        -- -- --              Drug Use as of 6/25/2019     Drug Use Types Frequency Comments Source    Yes  IV -- -- Provider            Sexual Activity as of 6/25/2019     Sexually Active Birth Control Partners Comments Source    Not Currently -- -- -- Provider            Activities of Daily Living as of 6/25/2019    None           Social Documentation as of 6/25/2019    None           Occupational as of 6/25/2019    None           Socioeconomic as of 6/25/2019     Marital Status Spouse Name Number of Children Years Education Education Level Preferred Language Ethnicity Race Source    Single -- -- -- -- English /White White --    Financial Resource Strain Food Insecurity: Worry Food Insecurity: Inability Transportation Needs: Medical Transportation Needs: Non-medical    -- -- -- -- --            Pertinent History     Question Response Comments    Lives with -- --    Place in Birth Order -- --    Lives in -- --    Number of Siblings -- --    Raised by -- --     Legal Involvement -- --    Childhood Trauma -- --    Criminal History of -- --    Financial Status -- --    Highest Level of Education -- --    Does patient have access to a firearm? -- --     Service -- --    Primary Leisure Activity -- --    Spirituality -- --        Past Medical History:   Diagnosis Date    Current smoker     Drug abuse, IV      Past Surgical History:   Procedure Laterality Date    INSERT ARTERIAL LINE  6/20/2019          Family History     Problem Relation (Age of Onset)    No Known Problems Mother, Father        Tobacco Use    Smoking status: Current Every Day Smoker     Packs/day: 1.00    Smokeless tobacco: Never Used   Substance and Sexual Activity    Alcohol use: No    Drug use: Yes     Types: IV    Sexual activity: Not Currently     Review of patient's allergies indicates:  No Known Allergies    No current facility-administered medications on file prior to encounter.      Current Outpatient Medications on File Prior to Encounter   Medication Sig    buprenorphine-naloxone (SUBOXONE) 8-2 mg Film Place 2 each under the tongue once daily.    clonazePAM (KLONOPIN) 0.5 MG tablet Take 0.5 mg by mouth 2 (two) times daily as needed for Anxiety.    FLUoxetine 40 MG capsule Take 40 mg by mouth once daily.    gabapentin (NEURONTIN) 300 MG capsule Take 300 mg by mouth 3 (three) times daily.     Psychotherapeutics (From admission, onward)    None        Review of Systems   Unable to perform ROS: Acuity of condition     Strengths and Liabilities: Strength: Patient accepts guidance/feedback, Strength: Patient has positive support network., Liability: Patient is dependent., Liability: Patient has poor health., Liability: Patient lacks coping skills.    Objective:     Vital Signs (Most Recent):  Temp: 98 °F (36.7 °C) (06/25/19 1505)  Pulse: 66 (06/25/19 1505)  Resp: 15 (06/25/19 1505)  BP: 107/67 (06/25/19 1505)  SpO2: 100 % (06/25/19 1505) Vital Signs (24h Range):  Temp:  [97.8 °F (36.6  "°C)-99.4 °F (37.4 °C)] 98 °F (36.7 °C)  Pulse:  [64-91] 66  Resp:  [11-27] 15  SpO2:  [99 %-100 %] 100 %  BP: ()/(58-81) 107/67     Height: 5' 10" (177.8 cm)  Weight: 59.4 kg (131 lb)  Body mass index is 18.8 kg/m².      Intake/Output Summary (Last 24 hours) at 6/25/2019 1611  Last data filed at 6/25/2019 1305  Gross per 24 hour   Intake 1152.5 ml   Output 650 ml   Net 502.5 ml       Physical Exam     Significant Labs:   Last 24 Hours:   Recent Lab Results       06/25/19  1507   06/25/19  0309   06/24/19 2016        Albumin   4.0       Alkaline Phosphatase   70       ALT   46       Anion Gap   12       Aniso   Slight       AST   25       Baso #   0.04       Basophil%   0.2       BILIRUBIN TOTAL   0.9  Comment:  For infants and newborns, interpretation of results should be based  on gestational age, weight and in agreement with clinical  observations.  Premature Infant recommended reference ranges:  Up to 24 hours.............<8.0 mg/dL  Up to 48 hours............<12.0 mg/dL  3-5 days..................<15.0 mg/dL  6-29 days.................<15.0 mg/dL         BUN, Bld   19       Calcium   9.7       Chloride   106       CO2   18       CPK   285       Creatinine   0.8       Differential Method   Automated       eGFR if    >60.0       eGFR if non    >60.0  Comment:  Calculation used to obtain the estimated glomerular filtration  rate (eGFR) is the CKD-EPI equation.          Eos #   0.2       Eosinophil%   1.2       Glucose   97       Gran # (ANC)   11.7       Gran%   67.3       Hematocrit   49.2       Hemoglobin   16.9       Hypo   Occasional       Immature Grans (Abs)   0.10  Comment:  Mild elevation in immature granulocytes is non specific and   can be seen in a variety of conditions including stress response,   acute inflammation, trauma and pregnancy. Correlation with other   laboratory and clinical findings is essential.         Immature Granulocytes   0.6       Coumadin " Monitoring INR 1.1  Comment:  Coumadin Therapy:  2.0 - 3.0 for INR for all indicators except mechanical heart valves  and antiphospholipid syndromes which should use 2.5 - 3.5.           Lymph #   3.2       Lymph%   18.5       Magnesium   2.3       MCH   30.8       MCHC   34.3       MCV   90       Mono #   2.1       Mono%   12.2       MPV   8.9       nRBC   0       Ovalocytes   Occasional       Phosphorus   4.0       Platelets   371       Poik   Slight       Poly   Occasional       Potassium   4.2       PROTEIN TOTAL   7.6       Protime 11.1         RBC   5.48       RDW   12.7       Sodium   136 140     WBC   17.42             Significant Imaging: I have reviewed all pertinent imaging results/findings within the past 24 hours.

## 2019-06-25 NOTE — TELEPHONE ENCOUNTER
----- Message from Paty Song LPN sent at 6/24/2019 10:59 AM CDT -----  CT orders please.    Paty  ----- Message -----  From: Evan Bob MD  Sent: 6/24/2019  10:44 AM  To: [facility] Staff    Please arrange for pt to follow up in three weeks with interval head CT.

## 2019-06-25 NOTE — PT/OT/SLP PROGRESS
"Speech Language Pathology Treatment    Patient Name:  Shahid Lackey   MRN:  [id number]  Admitting Diagnosis: Nontraumatic cortical hemorrhage of cerebral hemisphere    Recommendations:                 General Recommendations:  Dysphagia therapy, Speech/language therapy and Cognitive-linguistic therapy  Diet recommendations:  Regular, Liquid Diet Level: Thin   Aspiration Precautions: 1 bite/sip at a time, Avoid talking while eating, Eliminate distractions, HOB to 90 degrees and Standard aspiration precautions   General Precautions: Standard, aspiration, fall  Communication strategies:  provide increased time to answer and go to room if call light pushed    Subjective   "I'm making good progress I believe"    Pain/Comfort:  · Pain Rating 1: 0/10  · Pain Rating Post-Intervention 1: 0/10    Objective:     Has the patient been evaluated by SLP for swallowing?   Yes  Keep patient NPO? No   Current Respiratory Status: room air      Pt awake/alert upon arrival. Attention to left side evident with no cues as SLP entered room. Pt reported lack of appetite but is tolerating current diet. Cracker portion x2 trialed to assess least restrictive diet. Liquid wash x1 utilized. Pt demonstrated good mastication time, good coordination and oral containment, no anterior spillage, no oral residue, good initiation of swallow, clear vocal quality and no overt clinical signs of aspiration. Diet rec to be upgraded to regualr solids/thin liquids.   Pt  oriented to self, place, and partial-situation with min cues from SLP. Pt able to orient to date after mod assist from SLP and use of communication board. Confusion and confabulation evident, EX: Pt reported his mother/brother are  but he lives with them.  Pt read single words independently demonstrating attention to left. Simple left scanning number task attempted with max assist and minimal accuracy.  Pt noted to have decreased proprioception during task. Pt completed verbal problem " solving tasks with 100% accuracy and min cues. Education provided regarding left neglect, swallowing precautions, SLPs role, and POC. Pt verbalized understanding and agreement.     Assessment:     Shahid Lackey is a 33 y.o. male with an SLP diagnosis of Dysphagia, Cognitive-Linguistic Impairment and Visio-Spatial Impairment.      Goals:   Multidisciplinary Problems     SLP Goals        Problem: SLP Goal    Goal Priority Disciplines Outcome   SLP Goal     SLP Ongoing (interventions implemented as appropriate)   Description:  SLP goals to be met by 6/27  1.Pt will tolerate diet of dental soft solids/ thin liquids with no overt signs of aspiration-met  2. Pt will complete simple L scanning tasks with 80% accuracy with max A.  3. Pt will complete orientation tasks with 90% accuracy and min cues to increase safety awareness  4. Pt will complete problem solving/reasoning tasks with 80% accuracy and min cues to increase safety awareness-met   5. Pt will participate in ongoing assessment of reading and writing   6. Pt will participate in ongoing assessment of cognitive-linguistic skills     Updated:  1. Pt will tolerate diet of regular solids/thin liquids with no overt signs of aspiration.   2. Pt will complete problem solving/reasoning tasks with 90% accuracy and min cues to increase safety awareness                    Plan:     · Patient to be seen:  4 x/week   · Plan of Care expires:  07/19/19  · Plan of Care reviewed with:  patient   · SLP Follow-Up:  Yes       Discharge recommendations:  rehabilitation facility   Barriers to Discharge:  Level of Skilled Assistance Needed   and Safety Awareness      Time Tracking:     SLP Treatment Date:   06/25/19  Speech Start Time:  1111  Speech Stop Time:  1133     Speech Total Time (min):  22 min    Billable Minutes: Speech Therapy Individual 11 and Treatment Swallowing Dysfunction 11    MARCO Potter  06/25/2019

## 2019-06-25 NOTE — PT/OT/SLP PROGRESS
"Occupational Therapy   Treatment    Name: Shahid Lackey  MRN: [id number]  Admitting Diagnosis:  Nontraumatic cortical hemorrhage of cerebral hemisphere       Recommendations:     Discharge Recommendations: rehabilitation facility  Discharge Equipment Recommendations:  tub bench, wheelchair, manual  Barriers to discharge:  Inaccessible home environment, Decreased caregiver support    Assessment:     Shahid Lackey is a 33 y.o. male with a medical diagnosis of Nontraumatic cortical hemorrhage of cerebral hemisphere.  He presents with performance deficits affecting function are weakness, impaired self care skills, impaired balance, decreased coordination, decreased safety awareness, decreased ROM, impaired endurance, impaired functional mobilty, decreased upper extremity function, impaired cognition, gait instability, impaired sensation, decreased lower extremity function, abnormal tone, impaired coordination.     Rehab Prognosis:  Good; patient would benefit from acute skilled OT services to address these deficits and reach maximum level of function.       Plan:     Patient to be seen 4 x/week to address the above listed problems via self-care/home management, neuromuscular re-education, cognitive retraining, therapeutic activities, therapeutic exercises, sensory integration  · Plan of Care Expires: 07/19/19  · Plan of Care Reviewed with: patient    Subjective   Patient: "I feel like it is my responsibility to do my best.  Never strain your back.  I don't want you to hurt yourself.  Just tell me if I am not doing enough."  Pain/Comfort:  · Pain Rating 1: 0/10  · Pain Rating Post-Intervention 1: 0/10    Objective:     Communicated with: Nurse prior to session.  Patient found supine with blood pressure cuff, peripheral IV, telemetry, SCD, pulse ox (continuous) upon OT entry to room.  Family not present.  Sitter present.    General Precautions: Standard, aspiration, fall   Orthopedic Precautions:N/A   Braces: N/A "     Occupational Performance:     Bed Mobility:    · Patient completed Rolling/Turning to Right with total assistance  · Patient completed Supine to Sit with total assistance  · Patient completed Sit to Supine with total assistance     Functional Mobility/Transfers:  · Max-Total assist with scooting along the EOB.    Activities of Daily Living:  · Grooming: total assistance while seated EOB with left UE in weight bearing  · Upper Body Dressing: total assistance while seated EOB  · Lower Body Dressing: total assistance while seated EOB      [facility] 6 Click ADL: 6    Treatment & Education:  PROM performed left UE/LEs one set x 10 rep in all planes of motion with stretches provided at end range; sustained stretch provided for left UE external rotation, supination, shoulder flexion and ankle dorsiflexion; assistance and facilitation provided for upward rotation of the scapula during shoulder flexion and abduction.  Total assist required with postural control while seated EOB with left UE in weight bearing.  Addressed left UE weight bearing while seated EOB.  Positioning provided for midline orientation with bilateral UEs elevated and heels lifted off mattress.  Gentle cervical rotation provided to the left.   Family not present during the session.  Patient's functional status and disposition recommendation discussed with patient and nurse.  White board updated in patient's room.      Patient left supine with all lines intact, call button in reach and bed alarm onEducation:      GOALS:   Multidisciplinary Problems     Occupational Therapy Goals        Problem: Occupational Therapy Goal    Goal Priority Disciplines Outcome Interventions   Occupational Therapy Goal     OT, PT/OT     Description:  Goals set 6/21 to be addressed for 14 days with expiration date, 7/5:  Patient will increase functional independence with ADLs by performing:    Patient will demonstrate rolling to the right with min assist.  Not met   Patient will  demonstrate rolling to the left with min assist.   Not met  Patient will demonstrate supine -sit with mod assist.   Not met  Patient will demonstrate stand pivot transfers with mod assist.   Not met  Patient will demonstrate grooming while seated with min assist.   Not met  Patient will demonstrate upper body dressing with mod assist while seated EOB.   Not met  Patient will demonstrate lower body dressing with mod assist while seated EOB.   Not met  Patient will demonstrate toileting with mod assist.   Not met  Patient will demonstrate bathing while seated EOB with mod assist.   Not met  Patient's family / caregiver will demonstrate independence and safety with assisting patient with self-care skills and functional mobility.     Not met  Patient's family / caregiver will demonstrate independence with providing ROM and changes in bed positioning.   Not met                        Time Tracking:     OT Date of Treatment: 06/25/19  OT Start Time: 0540  OT Stop Time: 0604  OT Total Time (min): 24 min    Billable Minutes:Self Care/Home Management 12  Neuromuscular Re-education 12    LISA Barrios  6/25/2019

## 2019-06-25 NOTE — PLAN OF CARE
Problem: Occupational Therapy Goal  Goal: Occupational Therapy Goal  Goals set 6/21 to be addressed for 14 days with expiration date, 7/5:  Patient will increase functional independence with ADLs by performing:    Patient will demonstrate rolling to the right with min assist.  Not met   Patient will demonstrate rolling to the left with min assist.   Not met  Patient will demonstrate supine -sit with mod assist.   Not met  Patient will demonstrate stand pivot transfers with mod assist.   Not met  Patient will demonstrate grooming while seated with min assist.   Not met  Patient will demonstrate upper body dressing with mod assist while seated EOB.   Not met  Patient will demonstrate lower body dressing with mod assist while seated EOB.   Not met  Patient will demonstrate toileting with mod assist.   Not met  Patient will demonstrate bathing while seated EOB with mod assist.   Not met  Patient's family / caregiver will demonstrate independence and safety with assisting patient with self-care skills and functional mobility.     Not met  Patient's family / caregiver will demonstrate independence with providing ROM and changes in bed positioning.   Not met       Goals remain appropriate.  LISA Barrios  6/25/2019
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
## 2019-06-25 NOTE — PROGRESS NOTES
"[facility]  Adult Nutrition  Progress Note    SUMMARY       Recommendations    Recommendation/Intervention:   Continue dysphagia soft diet with texture changes per SLP recommendations.   Pt consuming approx 50% of meals and Boost.     RD to monitor.    Goals: Pt to tolerate >85% EEN and EPN by RD follow up  Nutrition Goal Status: progressing towards goal  Communication of RD Recs: reviewed with RN    Reason for Assessment    Reason For Assessment: RD follow-up  Diagnosis: hemorrhage  Relevant Medical History: IVDU  Interdisciplinary Rounds: attended  General Information Comments: Pt's diet remains advanced. Tolerating approx 50% of meals. Pt reports UBW approx 130-135lb with no weight loss. Pt's intake adequate PTA and appetite. Pt eating snacks between meals, drinking Boost. No indications of malnutrition at this time.  Nutrition Discharge Planning: adequate po intake for optimal nutrition    Nutrition Risk Screen    Nutrition Risk Screen: no indicators present    Nutrition/Diet History    Spiritual, Cultural Beliefs, Yarsanism Practices, Values that Affect Care: no  Factors Affecting Nutritional Intake: None identified at this time    Anthropometrics    Temp: 98.5 °F (36.9 °C)  Height: 5' 10" (177.8 cm)  Height (inches): 70 in  Weight Method: Bed Scale  Weight: 59.4 kg (131 lb)  Weight (lb): 131 lb  Ideal Body Weight (IBW), Male: 166 lb  % Ideal Body Weight, Male (lb): 78.92 lb  BMI (Calculated): 18.8  BMI Grade: 18.5-24.9 - normal       Lab/Procedures/Meds    Pertinent Labs Reviewed: reviewed  Pertinent Medications Reviewed: reviewed  Pertinent Medications Comments: heparin, folic acid, thiamine    Estimated/Assessed Needs    Weight Used For Calorie Calculations: 59.4 kg (130 lb 15.3 oz)  Energy Calorie Requirements (kcal): 1931  Energy Need Method: [place]-St Jeor(PAL 1.25)  Protein Requirements: 59-71g(1.0-1.2g/kg)  Weight Used For Protein Calculations: 59.4 kg (130 lb 15.3 oz)  Fluid " Requirements (mL): 1mL/kcal or per MD     RDA Method (mL): 1931         Nutrition Prescription Ordered    Current Diet Order: dental soft    Evaluation of Received Nutrient/Fluid Intake    IV Fluid (mL): 0  I/O: -I/O  % Intake of Estimated Energy Needs: 50 - 75 %  % Meal Intake: 50 - 75 %    Nutrition Risk    Level of Risk/Frequency of Follow-up: (f/u 1x/week)     Assessment and Plan    Nutrition Problem  Inadequate energy intake     Related to (etiology):   NPO     Signs and Symptoms (as evidenced by):   Pt receiving <85% EEN and EPN.      Intervention:  Collaboration of nutrition care with providers     Nutrition Diagnosis Status:   Resolved    Monitor and Evaluation    Food and Nutrient Intake: energy intake, food and beverage intake  Food and Nutrient Adminstration: diet order  Anthropometric Measurements: weight, weight change, body mass index  Biochemical Data, Medical Tests and Procedures: electrolyte and renal panel, gastrointestinal profile, glucose/endocrine profile, inflammatory profile, lipid profile  Nutrition-Focused Physical Findings: overall appearance       Nutrition Follow-Up    RD Follow-up?: Yes

## 2019-06-25 NOTE — PROGRESS NOTES
[facility]  Vascular Neurology  Comprehensive Stroke Center  Progress Note    Assessment/Plan:     * Nontraumatic cortical hemorrhage of cerebral hemisphere  34 y/o IV drug abuser with large R ICH with IVH.  MRV negative.  Angiogram completed w/ no signs of AVM or aneurysm but concern for vasculitis. 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       Antithrombotics: None ICH    Statins: None ICH    Aggressive risk factor modification: drug use, smoker     Rehab efforts: The patient has been evaluated by a stroke team provider and the therapy needs have been fully considered based off the presenting complaints and exam findings. The following therapy evaluations are needed: PT evaluate and treat, OT evaluate and treat, SLP evaluate and treat, PM&R evaluate for appropriate placement    Diagnostics ordered/pending: CT for any changes in neurological status, TCD to assess possible vasospasm     VTE prophylaxis: SCDs only large ICH    BP parameters: ICH: SBP <160        Brain compression  Off HTS.   Management per primary     Current smoker  Stroke risk factor  Counseled on smoking cessation  Patient willing to quit  Will need resources for smoking cessation at discharge    Vasogenic cerebral edema  Area of vasogenic cerebral edema identified when reviewing brain imaging in the territory of the R middle cerebral artery. There is mass effect associated with it. We will continue to monitor the patients clinical exam for any worsening of symptoms which may indicate expansion of the stroke or the area of the edema resulting in the clinical change. The pattern is suggestive of drug induced vasculopathy etiology         Transaminitis  History of hep c    Left hemiparesis  Due to ICH  Aggressive therapy when appropriate    Rhabdomyolysis  CPK trending down    Leukocytosis  WBC 16. Low grade fever  Off abx  No obvious source of infection at this time  procal WNL  Management per primary      Drug abuse, IV  Stroke risk factor  Counseled on cessation and patient requesting resources for drug rehab         6/20:  Restless but cooperative. On 2% HTS  6/21: Patient continues on 2% and Cardene gtt. Following simple commands.   6/22: On 2% gtt. Off cardene gtt.   6/23: TCDs daily, results pending. Of note possible step down tomorrow.  6/24: patient off cardene and HTS, plans for step down  6/25: patient CEC'd by  on 6/18, needs rehab placement, no behavior issues or agitation, no longer requiring restraints, patient interested in drug rehab resources, TCD with mildly elevated velocities left MCA territory on 6/24    STROKE DOCUMENTATION        NIH Scale:  1a. Level of Consciousness: 0-->Alert, keenly responsive  1b. LOC Questions: 0-->Answers both questions correctly  1c. LOC Commands: 0-->Performs both tasks correctly  2. Best Gaze: 2-->Forced deviation, or total gaze paresis not overcome by the oculocephalic maneuver  3. Visual: 2-->Complete hemianopia  4. Facial Palsy: 1-->Minor paralysis (flattened nasolabial fold, asymmetry on smiling)  5a. Motor Arm, Left: 4-->No movement  5b. Motor Arm, Right: 0-->No drift, limb holds 90 (or 45) degrees for full 10 secs  6a. Motor Leg, Left: 3-->No effort against gravity, leg falls to bed immediately  6b. Motor Leg, Right: 0-->No drift, leg holds 30 degree position for full 5 secs  7. Limb Ataxia: 0-->Absent  8. Sensory: 0-->Normal, no sensory loss  9. Best Language: 0-->No aphasia, normal  10. Dysarthria: 1-->Mild-to-moderate dysarthria, patient slurs at least some words and, at worst, can be understood with some difficulty  11. Extinction and Inattention (formerly Neglect): 0-->No abnormality  Total (NIH Stroke Scale): 13       Modified Traverse Score: 0  Catherine Coma Scale:    ABCD2 Score:    [id number] Score:   HAS -BLED Score:   ICH Score:3  Hunt & Dougherty Classification:      Hemorrhagic change of an Ischemic Stroke: Does this patient have an ischemic  stroke with hemorrhagic changes? No     Neurologic Chief Complaint: Right ICH    Subjective:     Interval History: Patient is seen for follow-up neurological assessment and treatment recommendations: NAEON, no new complaints      HPI, Past Medical, Family, and Social History remains the same as documented in the initial encounter.     Review of Systems   Constitutional: Negative for fever.   Eyes: Positive for visual disturbance.   Respiratory: Negative for shortness of breath.    Cardiovascular: Negative for chest pain.   Gastrointestinal: Negative for vomiting.   Neurological: Positive for facial asymmetry, speech difficulty and weakness. Negative for headaches.   Psychiatric/Behavioral: Negative for agitation and behavioral problems.     Scheduled Meds:   amLODIPine  5 mg Oral Daily    folic acid  1 mg Oral Daily    heparin (porcine)  5,000 Units Subcutaneous Q8H    senna-docusate 8.6-50 mg  1 tablet Oral BID    thiamine  100 mg Oral Daily     Continuous Infusions:    PRN Meds:acetaminophen, ondansetron, sodium chloride 0.9%, sodium chloride 0.9%    Objective:     Vital Signs (Most Recent):  Temp: 98.5 °F (36.9 °C) (06/25/19 0705)  Pulse: 70 (06/25/19 1005)  Resp: 19 (06/25/19 1005)  BP: 109/77 (06/25/19 1005)  SpO2: 100 % (06/25/19 1005)  BP Location: Left arm    Vital Signs Range (Last 24H):  Temp:  [98.5 °F (36.9 °C)-99.4 °F (37.4 °C)]   Pulse:  [64-91]   Resp:  [11-29]   BP: ()/(58-80)   SpO2:  [99 %-100 %]   Arterial Line BP: (109-130)/(67-83)   BP Location: Left arm    Physical Exam   Constitutional: He appears well-developed. No distress.   Underweight   HENT:   Head: Normocephalic and atraumatic.   Cardiovascular: Normal rate.   Pulmonary/Chest: Effort normal. Tachypnea noted. No respiratory distress.   Neurological: He is alert.   Skin: Skin is warm.   Nursing note and vitals reviewed.      Neurological Exam:   LOC: alert  Attention Span: good  Language: No aphasia  Articulation:  Dysarthria  Orientation: Not oriented to place, Not oriented to time  Visual Fields: L hemianopsia  EOM (CN III, IV, VI): Gaze preference  right  Pupils (CN II, III): PERRL  Facial Sensation (CN V): Normal  Facial Movement (CN VII): Lower facial weakness on the Left  Motor: LUE 0/5, RUE 5/5, RLE 5/5; LLE 2/5  Cebellar: No evidence of appendicular or axial ataxia  Sensation: intact bilaterally      Laboratory:  CMP:   Recent Labs   Lab 06/25/19  0309   CALCIUM 9.7   ALBUMIN 4.0   PROT 7.6      K 4.2   CO2 18*      BUN 19   CREATININE 0.8   ALKPHOS 70   ALT 46*   AST 25   BILITOT 0.9     BMP:   Recent Labs   Lab 06/25/19  0309      K 4.2      CO2 18*   BUN 19   CREATININE 0.8   CALCIUM 9.7     CBC:   Recent Labs   Lab 06/25/19  0309   WBC 17.42*   RBC 5.48   HGB 16.9   HCT 49.2   *   MCV 90   MCH 30.8   MCHC 34.3     Lipid Panel:   Recent Labs   Lab 06/19/19  1807   CHOL 144   LDLCALC 80.4   HDL 31*   TRIG 163*     Coagulation:   Recent Labs   Lab 06/20/19  0457   INR 1.1   APTT 28.4     Hgb A1C:   Recent Labs   Lab 06/19/19  1807   HGBA1C 5.2     TSH:   Recent Labs   Lab 06/19/19  1807   TSH 1.015       Diagnostic Results     Brain imaging:  MRI 6/20/2019  Large parenchymal hemorrhage centered within the bilateral parietal lobes extending across midline through the splenium of the corpus callosum corresponding to abnormality seen on CT.  There is thin subdural hemorrhage overlying the right cerebral hemisphere with questionable trace hemorrhage also overlying the left inferior frontal lobe.  Mass effect with approximate 7 mm of leftward midline shift with distortion of the ventricles similar to prior.  Trace prominence of the temporal horns lateral ventricles cannot exclude component of trapped ventricles and early hydrocephalus with remaining ventricular system relatively the normal in caliber.    CT head w/o contrast 6-19-19 results:  1. The study is significantly abnormal.  There is  acute intracranial hemorrhage with large parenchymal hemorrhage in the posteromedial right occipital parietal lobe crossing over the splenium of the corpus callosum into the posteromedial left occipital parietal lobe.  There is moderate-to-marked associated edema.  There is approximately 6 mm right to left midline shift.  Subdural blood is also seen along the falx and extending inferiorly along the right tentorium.  The cerebellar tonsils are normal position.  There is compression and depression of the lateral ventricles with moderate to marked dilatation of the temporal horns.     Vessel Imaging:    Angiogram 6/21/2019  Preliminary interpretation: No evidence of aneurysm or AVM. Diffuse irregularity of multiple small and medium sized arteries with multiple areas of narrowing/beading raising question of CNS vasculitis or RCVS,. Please see Imaging report for full details.    MRA/MRV 6/20/2019  MRA head: Unremarkable motion limited MRA of the head specifically without definite abnormal flow related enhancement associated with the region of parenchymal hemorrhage partially included in the study to suggest definite associated vascular malformation.  Correlation and follow up repeat imaging when patient better able to tolerate scanning advised.  Thin subdural hemorrhage overlies the right cerebral hemisphere.  MRV: Motion limited examination as detailed above with dominant right transverse and sigmoid sinuses.  The left sigmoid sinus is not seen and likely obscured by motion.    CTA Head and neck 6-19-19 results:    Acute intraparenchymal hematoma centered within right and left parietal lobes with extension across the corpus callosum.  Volume of hemorrhage is stable to slightly progressed in comparison to prior exam.  Slightly progressed edema and mass effect resulting in 8 cm leftward midline shift.  No new hemorrhage.    Stable dilatation of the temporal horns of lateral ventricles consistent mild  hydrocephalus.    Small volume subdural hemorrhage layering along the falx and tentorium, unchanged    No evidence of large intracranial aneurysm or AVM.  Please note evaluation is limited secondary to extensive hemorrhage and mass effect.  Consider cerebral angiography for further evaluation, as clinically indicated.     Cardiac Evaluation:   EKG 6-19-19 results:  Normal sinus rhythm  Biatrial enlargement  Prolonged QT  Abnormal ECG  When compared with ECG of 16-JUN-2019 12:31,  Vent. rate has increased BY 34 BPM  Non-specific change in ST segment in Inferior leads  QT has lengthened            Jelly Santana PA-C  Comprehensive Stroke Center  Department of Vascular Neurology   [facility]
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
## 2019-06-25 NOTE — ASSESSMENT & PLAN NOTE
Stroke risk factor  Counseled on smoking cessation  Patient willing to quit  Will need resources for smoking cessation at discharge

## 2019-06-25 NOTE — PLAN OF CARE
06/24/19 1926   Discharge Reassessment   Assessment Type Discharge Planning Reassessment   Provided patient/caregiver education on the expected discharge date and the discharge plan Yes   Do you have any problems affording any of your prescribed medications? No   Discharge Plan A Rehab   Discharge Plan B Rehab   DME Needed Upon Discharge  other (see comments)  (tbd)   Patient choice form signed by patient/caregiver N/A   Anticipated Discharge Disposition Rehab   Can the patient answer the patient profile reliably? Yes, cognitively intact   How does the patient rate their overall health at the present time? Fair   Describe the patient's ability to walk at the present time. Major restrictions/daily assistance from another person   How often would a person be available to care for the patient? Infrequently   Number of comorbid conditions (as recorded on the chart) None   Post-Acute Status   Post-Acute Authorization Placement   Post-Acute Placement Status Patient List Provided  (awaiting choices)   Discharge Delays None known at this time       Adriana Wesley RN, CCRN-K, [facility]  Neuro-Critical Care   [phone]

## 2019-06-25 NOTE — ASSESSMENT & PLAN NOTE
Assessment: Shahid Lackey is a 32 y/o Hx of IVDU admitted for intracranial hemorrhage. He was PEC/CEC at outside facilty due to agressive and agitation. Psych was consulted to evaluate continued need for PEC/CEC. Patient's orientation appears to wax and wane- Currently only oriented to self. He continues to report +SI/HI and paranoia. While patient made many threatening statements during the interview he remained psychically calm- no physically threatening gestures or signs of aggression noted. Per collateral he has a history of psychical agressive.     Recommendations:  1. Cont PEC at this time as the pt is currently a danger to self and others  2. DELIRIUM BEHAVIOR MANAGEMENT  · PLEASE utilize CHEMICAL restraints with PRN meds first for agitation. Minimize use of PHYSICAL restraints OR have periods of being out of physical restraints if possible.  · Keep window shades open and room lit during day and room dim at night in order to promote normal sleep-wake cycles  · Encourage family at bedside. [place] patient often to situation, location, date.  · Continue to Limit or Discontinue use of Narcotics, Benzos and Anti-cholinergic medications as they may worsen delirium.  · Continue medical workup for causative etiology of Delirium.  3. Elevated White count- order HIV, STD work up  4. PRN Medications: Zyprexa 5 mg po/im prn non-redirectable agitation associated with breakthrough psychosis or florin if needed to help the pt more effectively interact with environment.   5.  Psych will continue to follow patient  6. Case Discussed with: Dr. Zayda SMITH

## 2019-06-25 NOTE — ASSESSMENT & PLAN NOTE
WBC 16. Low grade fever  Off abx  No obvious source of infection at this time  procal WNL  Management per primary

## 2019-06-25 NOTE — PLAN OF CARE
SW left a message for Mrs. Valdez(pt's sister) asking for 5 choices.    Reyna Bell, [facility]  [phone]

## 2019-06-25 NOTE — PLAN OF CARE
Problem: Infection  Goal: Infection Symptom Resolution    Intervention: Prevent or Manage Infection  POC reviewed with pt and family at 1400. Pt verbalized understanding. Questions and concerns addressed. Psych consulted today. No acute events today. Pt progressing toward goals. Pt has transfer orders. Will continue to monitor. See flowsheets for full assessment and VS info.

## 2019-06-25 NOTE — SUBJECTIVE & OBJECTIVE
Neurologic Chief Complaint: Right ICH    Subjective:     Interval History: Patient is seen for follow-up neurological assessment and treatment recommendations: NAEON, no new complaints      HPI, Past Medical, Family, and Social History remains the same as documented in the initial encounter.     Review of Systems   Constitutional: Negative for fever.   Eyes: Positive for visual disturbance.   Respiratory: Negative for shortness of breath.    Cardiovascular: Negative for chest pain.   Gastrointestinal: Negative for vomiting.   Neurological: Positive for facial asymmetry, speech difficulty and weakness. Negative for headaches.   Psychiatric/Behavioral: Negative for agitation and behavioral problems.     Scheduled Meds:   amLODIPine  5 mg Oral Daily    folic acid  1 mg Oral Daily    heparin (porcine)  5,000 Units Subcutaneous Q8H    senna-docusate 8.6-50 mg  1 tablet Oral BID    thiamine  100 mg Oral Daily     Continuous Infusions:    PRN Meds:acetaminophen, ondansetron, sodium chloride 0.9%, sodium chloride 0.9%    Objective:     Vital Signs (Most Recent):  Temp: 98.5 °F (36.9 °C) (06/25/19 0705)  Pulse: 70 (06/25/19 1005)  Resp: 19 (06/25/19 1005)  BP: 109/77 (06/25/19 1005)  SpO2: 100 % (06/25/19 1005)  BP Location: Left arm    Vital Signs Range (Last 24H):  Temp:  [98.5 °F (36.9 °C)-99.4 °F (37.4 °C)]   Pulse:  [64-91]   Resp:  [11-29]   BP: ()/(58-80)   SpO2:  [99 %-100 %]   Arterial Line BP: (109-130)/(67-83)   BP Location: Left arm    Physical Exam   Constitutional: He appears well-developed. No distress.   Underweight   HENT:   Head: Normocephalic and atraumatic.   Cardiovascular: Normal rate.   Pulmonary/Chest: Effort normal. Tachypnea noted. No respiratory distress.   Neurological: He is alert.   Skin: Skin is warm.   Nursing note and vitals reviewed.      Neurological Exam:   LOC: alert  Attention Span: good  Language: No aphasia  Articulation: Dysarthria  Orientation: Not oriented to place, Not  oriented to time  Visual Fields: L hemianopsia  EOM (CN III, IV, VI): Gaze preference  right  Pupils (CN II, III): PERRL  Facial Sensation (CN V): Normal  Facial Movement (CN VII): Lower facial weakness on the Left  Motor: LUE 0/5, RUE 5/5, RLE 5/5; LLE 2/5  Cebellar: No evidence of appendicular or axial ataxia  Sensation: intact bilaterally      Laboratory:  CMP:   Recent Labs   Lab 06/25/19  0309   CALCIUM 9.7   ALBUMIN 4.0   PROT 7.6      K 4.2   CO2 18*      BUN 19   CREATININE 0.8   ALKPHOS 70   ALT 46*   AST 25   BILITOT 0.9     BMP:   Recent Labs   Lab 06/25/19  0309      K 4.2      CO2 18*   BUN 19   CREATININE 0.8   CALCIUM 9.7     CBC:   Recent Labs   Lab 06/25/19 0309   WBC 17.42*   RBC 5.48   HGB 16.9   HCT 49.2   *   MCV 90   MCH 30.8   MCHC 34.3     Lipid Panel:   Recent Labs   Lab 06/19/19  1807   CHOL 144   LDLCALC 80.4   HDL 31*   TRIG 163*     Coagulation:   Recent Labs   Lab 06/20/19  0457   INR 1.1   APTT 28.4     Hgb A1C:   Recent Labs   Lab 06/19/19  1807   HGBA1C 5.2     TSH:   Recent Labs   Lab 06/19/19  1807   TSH 1.015       Diagnostic Results     Brain imaging:  MRI 6/20/2019  Large parenchymal hemorrhage centered within the bilateral parietal lobes extending across midline through the splenium of the corpus callosum corresponding to abnormality seen on CT.  There is thin subdural hemorrhage overlying the right cerebral hemisphere with questionable trace hemorrhage also overlying the left inferior frontal lobe.  Mass effect with approximate 7 mm of leftward midline shift with distortion of the ventricles similar to prior.  Trace prominence of the temporal horns lateral ventricles cannot exclude component of trapped ventricles and early hydrocephalus with remaining ventricular system relatively the normal in caliber.    CT head w/o contrast 6-19-19 results:  1. The study is significantly abnormal.  There is acute intracranial hemorrhage with large parenchymal  hemorrhage in the posteromedial right occipital parietal lobe crossing over the splenium of the corpus callosum into the posteromedial left occipital parietal lobe.  There is moderate-to-marked associated edema.  There is approximately 6 mm right to left midline shift.  Subdural blood is also seen along the falx and extending inferiorly along the right tentorium.  The cerebellar tonsils are normal position.  There is compression and depression of the lateral ventricles with moderate to marked dilatation of the temporal horns.     Vessel Imaging:    Angiogram 6/21/2019  Preliminary interpretation: No evidence of aneurysm or AVM. Diffuse irregularity of multiple small and medium sized arteries with multiple areas of narrowing/beading raising question of CNS vasculitis or RCVS,. Please see Imaging report for full details.    MRA/MRV 6/20/2019  MRA head: Unremarkable motion limited MRA of the head specifically without definite abnormal flow related enhancement associated with the region of parenchymal hemorrhage partially included in the study to suggest definite associated vascular malformation.  Correlation and follow up repeat imaging when patient better able to tolerate scanning advised.  Thin subdural hemorrhage overlies the right cerebral hemisphere.  MRV: Motion limited examination as detailed above with dominant right transverse and sigmoid sinuses.  The left sigmoid sinus is not seen and likely obscured by motion.    CTA Head and neck 6-19-19 results:    Acute intraparenchymal hematoma centered within right and left parietal lobes with extension across the corpus callosum.  Volume of hemorrhage is stable to slightly progressed in comparison to prior exam.  Slightly progressed edema and mass effect resulting in 8 cm leftward midline shift.  No new hemorrhage.    Stable dilatation of the temporal horns of lateral ventricles consistent mild hydrocephalus.    Small volume subdural hemorrhage layering along the falx  and tentorium, unchanged    No evidence of large intracranial aneurysm or AVM.  Please note evaluation is limited secondary to extensive hemorrhage and mass effect.  Consider cerebral angiography for further evaluation, as clinically indicated.     Cardiac Evaluation:   EKG 6-19-19 results:  Normal sinus rhythm  Biatrial enlargement  Prolonged QT  Abnormal ECG  When compared with ECG of 16-JUN-2019 12:31,  Vent. rate has increased BY 34 BPM  Non-specific change in ST segment in Inferior leads  QT has lengthened

## 2019-06-25 NOTE — PLAN OF CARE
Problem: Adult Inpatient Plan of Care  Goal: Plan of Care Review  Outcome: Ongoing (interventions implemented as appropriate)  POC reviewed with pt at 0600. Pt verbalized understanding. Questions and concerns addressed. No acute events overnight. Pt progressing toward goals. Orders to transfer to the floor, waiting for a bed. Will continue to monitor. See flowsheets for full assessment and VS info

## 2019-06-25 NOTE — PROGRESS NOTES
ICU Progress Note  Neurocritical Care    Admit Date: 6/19/2019  LOS: 6    CC: Nontraumatic cortical hemorrhage of cerebral hemisphere    Code Status: Full Code     SUBJECTIVE:     Interval History/Significant Events:   Ich  Poly substance use  Brain compression-poa  Vasogenic edema-poa  Rising hb  Cns vasculitis      Medications:  Continuous Infusions:  Scheduled Meds:   amLODIPine  5 mg Oral Daily    folic acid  1 mg Oral Daily    heparin (porcine)  5,000 Units Subcutaneous Q8H    senna-docusate 8.6-50 mg  1 tablet Oral BID    thiamine  100 mg Oral Daily     PRN Meds:.acetaminophen, ondansetron, sodium chloride 0.9%, sodium chloride 0.9%    OBJECTIVE:   Vital Signs (Most Recent):   Temp: 98.5 °F (36.9 °C) (06/25/19 0705)  Pulse: 70 (06/25/19 1005)  Resp: 19 (06/25/19 1005)  BP: 109/77 (06/25/19 1005)  SpO2: 100 % (06/25/19 1005)    Vital Signs (24h Range):   Temp:  [98.5 °F (36.9 °C)-99.4 °F (37.4 °C)] 98.5 °F (36.9 °C)  Pulse:  [64-91] 70  Resp:  [11-29] 19  SpO2:  [99 %-100 %] 100 %  BP: ()/(58-80) 109/77  Arterial Line BP: (109-130)/(67-83) 130/83    ICP/CPP (Last 24h):        I & O (Last 24h):     Intake/Output Summary (Last 24 hours) at 6/25/2019 1054  Last data filed at 6/25/2019 0905  Gross per 24 hour   Intake 795 ml   Output 650 ml   Net 145 ml     Physical Exam:  GA: Alert, comfortable, no acute distress.   HEENT: No scleral icterus or JVD.   Pulmonary: Clear to auscultation A/P/L. No wheezing, crackles, or rhonchi.  Cardiac: RRR S1 & S2 w/o rubs/murmurs/gallops.   Abdominal: Bowel sounds present x 4. No appreciable hepatosplenomegaly.  Skin: No jaundice, rashes, or visible lesions.  Neuro:      Awake  Alert  ox4  withdraws on left side         Lines/Drains/Airway:          Nutrition/Tube Feeds (if NPO state why): po    Labs:  ABG: No results for input(s): PH, PO2, PCO2, HCO3, POCSATURATED, BE in the last 24 hours.  BMP:  Recent Labs   Lab 06/25/19  0309      K 4.2      CO2 18*    BUN 19   CREATININE 0.8   GLU 97   MG 2.3   PHOS 4.0     LFT:   Lab Results   Component Value Date    AST 25 06/25/2019    ALT 46 (H) 06/25/2019    ALKPHOS 70 06/25/2019    BILITOT 0.9 06/25/2019    ALBUMIN 4.0 06/25/2019    PROT 7.6 06/25/2019     CBC:   Lab Results   Component Value Date    WBC 17.42 (H) 06/25/2019    HGB 16.9 06/25/2019    HCT 49.2 06/25/2019    MCV 90 06/25/2019     (H) 06/25/2019     Microbiology x 7d:   Microbiology Results (last 7 days)     Procedure Component Value Units Date/Time    Blood culture [[id number]] Collected:  06/22/19 0633    Order Status:  Completed Specimen:  Blood from Peripheral, Ankle, Right Updated:  06/25/19 0822     Blood Culture, Routine No Growth to date     Blood Culture, Routine No Growth to date     Blood Culture, Routine No Growth to date     Blood Culture, Routine No Growth to date    Narrative:       Blood cultures from 2 different sites. 4 bottles total.  Please draw before starting antibiotics.    Blood culture [[id number]] Collected:  06/22/19 0643    Order Status:  Completed Specimen:  Blood from Peripheral, Foot, Left Updated:  06/25/19 0822     Blood Culture, Routine No Growth to date     Blood Culture, Routine No Growth to date     Blood Culture, Routine No Growth to date     Blood Culture, Routine No Growth to date    Narrative:       Blood cultures x 2 different sites. 4 bottles total. Please  draw cultures before administering antibiotics.    Culture, Respiratory with Gram Stain [[id number]] Collected:  06/22/19 0646    Order Status:  Completed Specimen:  Respiratory from Sputum Updated:  06/24/19 0931     Respiratory Culture Normal respiratory yasir     Respiratory Culture No S aureus or Pseudomonas isolated.     Gram Stain (Respiratory) <10 epithelial cells per low power field.     Gram Stain (Respiratory) Few WBC's     Gram Stain (Respiratory) Rare yeast    Narrative:       Mini-BAL.    Blood culture [[id number]]     Order Status:  Canceled  Specimen:  Blood     Blood culture [[id number]]     Order Status:  Canceled Specimen:  Blood     Culture, Respiratory with Gram Stain [[id number]]     Order Status:  No result Specimen:  Respiratory           ASSESSMENT/PLAN:     Active Hospital Problems    Diagnosis    *Nontraumatic cortical hemorrhage of cerebral hemisphere    Vasogenic cerebral edema    Current smoker    Brain compression    Hypokalemia    Metabolic alkalosis    Sepsis    ICH (intracerebral hemorrhage)    Left hemiparesis    Transaminitis    Rhabdomyolysis    Leukocytosis    Drug abuse, IV            Plan:  Fluids  Transfer to   Follow exam    Level;2

## 2019-06-26 NOTE — PT/OT/SLP PROGRESS
"Physical Therapy Treatment    Patient Name:  Shahid Lackey   MRN:  [id number]  Admitting Diagnosis: Nontraumatic cortical hemorrhage of cerebral hemisphere  Recent Surgery: * No surgery found *      Recommendations:     Discharge Recommendations:  rehabilitation facility   Discharge Equipment Recommendations: hospital bed, tub bench, wheelchair, manual   Barriers to discharge: Inaccessible home and Decreased caregiver support  Decreased functional mobility increasing fall risk    Plan:     During this hospitalization, patient to be seen 4 x/week to address the above listed problems via gait training, therapeutic activities, therapeutic exercises, neuromuscular re-education  · Plan of Care Expires:  07/20/19   Plan of Care Reviewed with: patient    This Plan of care has been discussed with the patient who was involved in its development and understands and is in agreement with the identified goals and treatment plan    Subjective     Communicated with RN (Reyna) prior to session.     Patient comments: "I feel so hopeless"  Pain/Comfort:  · Pain Rating 1: (No rating provided)  · Location - Side 1: Left  · Location - Orientation 1: generalized  · Location 1: (leg and arm)  · Pain Addressed 1: Reposition, Distraction, Cessation of Activity  · Pain Rating Post-Intervention 1: (No rating provided)    Objective:     Patient found with: peripheral IV, telemetry(waffle overlay)    Patient found in L side lying with L LE and L UE under body upon PT entry to room, agreeable to treatment.  Sitter present in the room.    General Precautions: Standard, aspiration, fall   Orthopedic Precautions:N/A   Braces: N/A       BED MOBILITY (vc's for hand placement sequencing of task):        Rolling to the R:  Mod/max A.       Rolling to the L:  NT.        Sup > sit at the EOB:  Mod/max A for trunk elevation and mgt of B LE's.       Sit > sup:  Max A for trunk and B LE's.       Scooting hips to EOB with mod/max A                SITTING " AT THE EDGE OF THE BED (15-20 min) Pt requesting to be assisted back to sup multiple times throughout activity   Assistance Level Required:  Mod < > max A for trunk/head control with R UE support and L UE in weight bearing position   Postural deviations noted: flexed thoracic spine, PPT, rounded shoulders, flexed cervical spine, L post lean and pt pushing posteriorly and to the L.  L foot in plantarflexion/inversion   Encouraged: upright posture, neutral pelvis, midline orientation, B feet in contact with the floor and attention to the L        TRANSFERS    Sit <> Stand Transfer from EOB Not attempted 2* pt with poor trunk control while seated at the EOB and poor command following     THERAPEUTIC ACTIVITIES/EXERCISES  Pt receives PROM to L LE sup in bed (ankle DF, hip flex, hip add/abd, hip IR/ER)  x10 reps     EDUCATION  Education provided to pt regarding: postural control, attention to the L and importance and benefits of performing exercises and functional mobility.    They were provided and educated on proper positioning in supine and in sitting with support of affected L UE in order to increase awareness of it and to decrease the effects of immobility, specifically edema and pain.     Whiteboard updated with correct mobility information. RN/PCT notified.  Pt requires mod/max A to sit at the EOB.    Patient left sup in bed with L UE supported on pillow, with  all lines intact, call button in reach, bed alarm on, RN notified and sitter present    AM-PAC 6 CLICK MOBILITY  Turning over in bed (including adjusting bedclothes, sheets and blankets)?: 2  Sitting down on and standing up from a chair with arms (e.g., wheelchair, bedside commode, etc.): 1  Moving from lying on back to sitting on the side of the bed?: 2  Moving to and from a bed to a chair (including a wheelchair)?: 1  Need to walk in hospital room?: 1  Climbing 3-5 steps with a railing?: 1  Basic Mobility Total Score: 8     Assessment:     Shahid HOLT  Ziyad is a 33 y.o. male admitted with a medical diagnosis of Nontraumatic cortical hemorrhage of cerebral hemisphere.  He presents with the following impairments/functional limitations:  weakness, impaired endurance, impaired sensation, impaired self care skills, impaired functional mobilty, gait instability, impaired balance, impaired cognition, decreased coordination, decreased upper extremity function, decreased lower extremity function, decreased safety awareness, pain, abnormal tone, decreased ROM, impaired coordination, impaired fine motor, impaired joint extensibility. L hemiparesis requiring significant assistance and verbal cues for bed mob, scooting to/along the EOB, static sitting at the EOB 2* weakness, pain, impulsivity, post lean/push and cognitive deficits.   In light of pt's current functional level and deficits, it is anticipated that pt will need to participate in an intense rehab program consisting of PT, OT and ST in order to achieve full rehab potential to return to previous level of function and roles.  Pt will cont to benefit from skilled PT intervention to address deficits and improve functional mobility.    Rehab Prognosis:  Good; patient would benefit from acute skilled PT services to address these deficits and reach maximum level of function.      GOALS:   Multidisciplinary Problems     Physical Therapy Goals        Problem: Physical Therapy Goal    Goal Priority Disciplines Outcome Goal Variances Interventions   Physical Therapy Goal     PT, PT/OT Ongoing (interventions implemented as appropriate)     Description:  Goals to be met by: 2019     Patient will increase functional independence with mobility by performin. Supine to sit with Moderate Assistance  2. Sit to supine with Moderate Assistance  3. Sit to stand transfer with maximum assistance   4. Gait  x 5 feet with Maximum Assistance using least restrictive device   5. Sitting at edge of bed x10 minutes with Minimal  Assistance  6. Lower extremity exercise program x20 reps per handout, with assistance as needed to improve strength and ROM and increase activity tolerance.                       Time Tracking:     PT Received On: 06/26/19  PT Start Time: 1129     PT Stop Time: 1202  PT Total Time (min): 33 min     Billable Minutes: Therapeutic Activity 13 and Neuromuscular Re-education 20    Treatment Type: Treatment  PT/PTA: PTA     PTA Visit Number: 1       Jelly Castro PTA.  Pager [phone]    6/26/2019    .

## 2019-06-26 NOTE — SUBJECTIVE & OBJECTIVE
"Interval History: as above.    Family History     Problem Relation (Age of Onset)    No Known Problems Mother, Father        Tobacco Use    Smoking status: Current Every Day Smoker     Packs/day: 1.00    Smokeless tobacco: Never Used   Substance and Sexual Activity    Alcohol use: No    Drug use: Yes     Types: IV    Sexual activity: Not Currently     Psychotherapeutics (From admission, onward)    Start     Stop Route Frequency Ordered    06/26/19 0815  OLANZapine tablet 5 mg      -- Oral Every 6 hours PRN 06/26/19 0716           Review of Systems  Objective:     Vital Signs (Most Recent):  Temp: 98.6 °F (37 °C) (06/26/19 1453)  Pulse: 72 (06/26/19 1500)  Resp: 17 (06/26/19 1453)  BP: 105/62 (06/26/19 1453)  SpO2: 96 % (06/26/19 1453) Vital Signs (24h Range):  Temp:  [97.5 °F (36.4 °C)-99.4 °F (37.4 °C)] 98.6 °F (37 °C)  Pulse:  [54-83] 72  Resp:  [15-21] 17  SpO2:  [87 %-100 %] 96 %  BP: (101-133)/(59-75) 105/62     Height: 5' 10" (177.8 cm)  Weight: 59.4 kg (131 lb)  Body mass index is 18.8 kg/m².      Intake/Output Summary (Last 24 hours) at 6/26/2019 1516  Last data filed at 6/26/2019 1200  Gross per 24 hour   Intake 2210 ml   Output 1125 ml   Net 1085 ml       Physical Exam   Psychiatric:   Mental Status Exam:  Appearance: unremarkable, age appropriate  Behavior: normal, cooperative  Speech/Language: normal tone, normal rate, normal pitch, normal volume  Mood: good  Affect: Normal   Thought Process:  normal and logical  Thought Content: normal, no suicidality, no homicidality, delusions, or paranoia   Orientation: Only to person  Cognition: grossly intact  Insight:    poor  Judgment: poor            Significant Labs:   Last 24 Hours:   Recent Lab Results       06/26/19  0813   06/26/19  0315        Albumin   3.9     Alkaline Phosphatase   77     ALT   41     Anion Gap   11     AST   25     Baso #   0.03     Basophil%   0.2     BILIRUBIN TOTAL   0.7  Comment:  For infants and newborns, interpretation of " results should be based  on gestational age, weight and in agreement with clinical  observations.  Premature Infant recommended reference ranges:  Up to 24 hours.............<8.0 mg/dL  Up to 48 hours............<12.0 mg/dL  3-5 days..................<15.0 mg/dL  6-29 days.................<15.0 mg/dL       BUN, Bld   15     Calcium   9.5     Chloride   105     CO2   23     CPK   232     Creatinine   0.7     Differential Method   Automated     eGFR if    >60.0     eGFR if non    >60.0  Comment:  Calculation used to obtain the estimated glomerular filtration  rate (eGFR) is the CKD-EPI equation.        Eos #   0.2     Eosinophil%   1.8     Glucose   73     Gran # (ANC)   7.5     Gran%   58.8     Hematocrit   49.2     Hemoglobin   18.4     Hep A IgM Negative       Hep B C IgM Negative       Hepatitis B Surface Ag Negative       Hepatitis C Ab Positive       Immature Grans (Abs)   0.09  Comment:  Mild elevation in immature granulocytes is non specific and   can be seen in a variety of conditions including stress response,   acute inflammation, trauma and pregnancy. Correlation with other   laboratory and clinical findings is essential.       Immature Granulocytes   0.7     Lymph #   3.3     Lymph%   26.1     Magnesium   2.2     MCH   34.1     MCHC   37.4     MCV   91     Mono #   1.6     Mono%   12.4     MPV   9.4     nRBC   0     Phosphorus   3.8     Platelets   392     Potassium   3.6     PROTEIN TOTAL   7.5     RBC   5.40     RDW   12.9     Sodium   139     WBC   12.74           Significant Imaging: None

## 2019-06-26 NOTE — SUBJECTIVE & OBJECTIVE
Neurologic Chief Complaint: Right ICH    Subjective:     Interval History: Patient is seen for follow-up neurological assessment and treatment recommendations:     Pt stepped down overnight. Psych consulted yesterday and recommended continuing PEC. Zyprexa ordered PRN for non-redirectable agitation. STD work up as suggested including hep panel. Pleasant on exam this AM - cooperating for most part but continues to say sorry I am just tired.     HPI, Past Medical, Family, and Social History remains the same as documented in the initial encounter.     Review of Systems   Constitutional: Negative for fever.   Eyes: Positive for visual disturbance.   Respiratory: Negative for shortness of breath.    Cardiovascular: Negative for chest pain.   Gastrointestinal: Negative for vomiting.   Neurological: Positive for facial asymmetry, speech difficulty and weakness. Negative for headaches.   Psychiatric/Behavioral: Negative for agitation and behavioral problems.     Scheduled Meds:   amLODIPine  5 mg Oral Daily    folic acid  1 mg Oral Daily    heparin (porcine)  5,000 Units Subcutaneous Q8H    senna-docusate 8.6-50 mg  1 tablet Oral BID    thiamine  100 mg Oral Daily     Continuous Infusions:   sodium chloride 0.9% 50 mL/hr at 06/25/19 2305     PRN Meds:acetaminophen, OLANZapine, ondansetron, sodium chloride 0.9%, sodium chloride 0.9%    Objective:     Vital Signs (Most Recent):  Temp: (Refused vitals) (06/26/19 0445)  Pulse: (!) 58 (06/26/19 0807)  Resp: 16 (06/26/19 0807)  BP: (!) 117/59 (06/26/19 0807)  SpO2: 96 % (06/26/19 0807)  BP Location: Left arm    Vital Signs Range (Last 24H):  Temp:  [97.8 °F (36.6 °C)-99.4 °F (37.4 °C)]   Pulse:  [54-83]   Resp:  [15-21]   BP: (100-133)/(59-81)   SpO2:  [87 %-100 %]   BP Location: Left arm    Physical Exam   Constitutional: He appears well-developed. No distress.   Underweight   HENT:   Head: Normocephalic and atraumatic.   Cardiovascular: Normal rate.   Pulmonary/Chest:  Effort normal. No respiratory distress.   Neurological: He is alert.   Skin: Skin is warm.   Nursing note and vitals reviewed.      Neurological Exam:   LOC: alert  Attention Span: good  Language: No aphasia  Articulation: Dysarthria  Orientation: Not oriented to place, Not oriented to time  Visual Fields: L hemianopsia  EOM (CN III, IV, VI): Gaze preference  right  Pupils (CN II, III): PERRL  Facial Sensation (CN V): Normal  Facial Movement (CN VII): Lower facial weakness on the Left  Motor: LUE 0/5, RUE 5/5, RLE 5/5; LLE 3/5  Cebellar: No evidence of appendicular or axial ataxia  Sensation: intact bilaterally      Laboratory:  CMP:   Recent Labs   Lab 06/26/19  0315   CALCIUM 9.5   ALBUMIN 3.9   PROT 7.5      K 3.6   CO2 23      BUN 15   CREATININE 0.7   ALKPHOS 77   ALT 41   AST 25   BILITOT 0.7     BMP:   Recent Labs   Lab 06/26/19  0315      K 3.6      CO2 23   BUN 15   CREATININE 0.7   CALCIUM 9.5     CBC:   Recent Labs   Lab 06/26/19  0315   WBC 12.74*   RBC 5.40   HGB 18.4*   HCT 49.2   *   MCV 91   MCH 34.1*   MCHC 37.4*     Lipid Panel:   Recent Labs   Lab 06/19/19  1807   CHOL 144   LDLCALC 80.4   HDL 31*   TRIG 163*     Coagulation:   Recent Labs   Lab 06/20/19  0457 06/25/19  1507   INR 1.1 1.1   APTT 28.4  --      Hgb A1C:   Recent Labs   Lab 06/19/19 1807   HGBA1C 5.2     TSH:   Recent Labs   Lab 06/19/19  1807   TSH 1.015       Diagnostic Results     Brain imaging:  MRI 6/20/2019  Large parenchymal hemorrhage centered within the bilateral parietal lobes extending across midline through the splenium of the corpus callosum corresponding to abnormality seen on CT.  There is thin subdural hemorrhage overlying the right cerebral hemisphere with questionable trace hemorrhage also overlying the left inferior frontal lobe.  Mass effect with approximate 7 mm of leftward midline shift with distortion of the ventricles similar to prior.  Trace prominence of the temporal horns  lateral ventricles cannot exclude component of trapped ventricles and early hydrocephalus with remaining ventricular system relatively the normal in caliber.    CT head w/o contrast 6-19-19 results:  1. The study is significantly abnormal.  There is acute intracranial hemorrhage with large parenchymal hemorrhage in the posteromedial right occipital parietal lobe crossing over the splenium of the corpus callosum into the posteromedial left occipital parietal lobe.  There is moderate-to-marked associated edema.  There is approximately 6 mm right to left midline shift.  Subdural blood is also seen along the falx and extending inferiorly along the right tentorium.  The cerebellar tonsils are normal position.  There is compression and depression of the lateral ventricles with moderate to marked dilatation of the temporal horns.     Vessel Imaging:    Angiogram 6/21/2019  Preliminary interpretation: No evidence of aneurysm or AVM. Diffuse irregularity of multiple small and medium sized arteries with multiple areas of narrowing/beading raising question of CNS vasculitis or RCVS,. Please see Imaging report for full details.    MRA/MRV 6/20/2019  MRA head: Unremarkable motion limited MRA of the head specifically without definite abnormal flow related enhancement associated with the region of parenchymal hemorrhage partially included in the study to suggest definite associated vascular malformation.  Correlation and follow up repeat imaging when patient better able to tolerate scanning advised.  Thin subdural hemorrhage overlies the right cerebral hemisphere.  MRV: Motion limited examination as detailed above with dominant right transverse and sigmoid sinuses.  The left sigmoid sinus is not seen and likely obscured by motion.    CTA Head and neck 6-19-19 results:    Acute intraparenchymal hematoma centered within right and left parietal lobes with extension across the corpus callosum.  Volume of hemorrhage is stable to  slightly progressed in comparison to prior exam.  Slightly progressed edema and mass effect resulting in 8 cm leftward midline shift.  No new hemorrhage.    Stable dilatation of the temporal horns of lateral ventricles consistent mild hydrocephalus.    Small volume subdural hemorrhage layering along the falx and tentorium, unchanged    No evidence of large intracranial aneurysm or AVM.  Please note evaluation is limited secondary to extensive hemorrhage and mass effect.  Consider cerebral angiography for further evaluation, as clinically indicated.     Cardiac Evaluation:   EKG 6-19-19 results:  Normal sinus rhythm  Biatrial enlargement  Prolonged QT  Abnormal ECG  When compared with ECG of 16-JUN-2019 12:31,  Vent. rate has increased BY 34 BPM  Non-specific change in ST segment in Inferior leads  QT has lengthened

## 2019-06-26 NOTE — PLAN OF CARE
"Problem: Adult Inpatient Plan of Care  Goal: Plan of Care Review  Outcome: Ongoing (interventions implemented as appropriate)  Pt arrived on the unit accompanied by RN and sitter. Pt is oriented to self only. Plan of care reviewed with pt, unable to voice understanding. Pt was oriented to this unit, but unable to comprehend. Only calmed by sitter, whom he calls "friend". Fall precautions reviewed, pt denies questions at this time. Will continue to monitor.      "

## 2019-06-26 NOTE — ASSESSMENT & PLAN NOTE
Assessment: Shahid Lackey is a 32 y/o Hx of IVDU admitted for intracranial hemorrhage. He was PEC/CEC at outside facilty due to agressive and agitation. Psych was consulted to evaluate continued need for PEC/CEC. Patient's orientation appears to wax and wane- Currently only oriented to self.  He is no longer endorsing paranoia or delusional thought content.  Pt denies SI/HI/AVH.    Recommendations:  1. Cont PEC at this time as the pt is currently a danger to self and others, if he has another good night with no agitation the CEC can likely be rescinded tomorrow.  2. DELIRIUM BEHAVIOR MANAGEMENT  · PLEASE utilize CHEMICAL restraints with PRN meds first for agitation. Minimize use of PHYSICAL restraints OR have periods of being out of physical restraints if possible.  · Keep window shades open and room lit during day and room dim at night in order to promote normal sleep-wake cycles  · Encourage family at bedside. [place] patient often to situation, location, date.  · Continue to Limit or Discontinue use of Narcotics, Benzos and Anti-cholinergic medications as they may worsen delirium.  · Continue medical workup for causative etiology of Delirium.  3. Elevated White count- order HIV, STD work up  4. PRN Medications: Continue Zyprexa 5 mg po/im prn non-redirectable agitation associated with breakthrough psychosis or florin if needed to help the pt more effectively interact with environment. Only PO was ordered, please order IM which is much more effective when pt refuses PO.  5.  Psych will continue to follow patient  6. Case Discussed with: Dr. Zayda SMITH
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
## 2019-06-26 NOTE — PLAN OF CARE
Problem: Physical Therapy Goal  Goal: Physical Therapy Goal  Goals to be met by: 2019     Patient will increase functional independence with mobility by performin. Supine to sit with Moderate Assistance  2. Sit to supine with Moderate Assistance  3. Sit to stand transfer with maximum assistance   4. Gait  x 5 feet with Maximum Assistance using least restrictive device   5. Sitting at edge of bed x10 minutes with Minimal Assistance  6. Lower extremity exercise program x20 reps per handout, with assistance as needed to improve strength and ROM and increase activity tolerance.        Discharge Recommendations: Rehab    Pt requires mod/max A to sit at the EOB.    Goals remain appropriate.     eJlly Castro, PTA.   [phone]   2019

## 2019-06-26 NOTE — ASSESSMENT & PLAN NOTE
WBC 12.74   Off abx  No obvious source of infection at this time  procal WNL  RPR and STD labs ordered per psych recs

## 2019-06-26 NOTE — NURSING TRANSFER
Nursing Transfer Note      6/26/2019     Transfer To: [id number]    Transfer via bed    Transfer with telemetry monitoring     Transported by RN, PCT, sitter, security x2    Medicines sent: n/a    Chart send with patient: Yes    Notified: sibling    Patient reassessed at: 6/26 0009    Upon arrival to floor: patient oriented to room, call bell in reach and bed in lowest position

## 2019-06-26 NOTE — HOSPITAL COURSE
"6/26/2019   Patient seen and chart reviewed. Patient has been medication compliant. Received no psychiatric PRNs in the past 24 hours. Patient calm and cooperative with interview. Mood is "good".   Denies SI, HI, AVH. Reports sleeping and eating well. No somatic complaints, no other complaints during interview. CAM-ICU neg.      6/27/2019   Patient seen and chart reviewed. Patient has been medication compliant. Received zyprexa 5 mg PO at 2323.  Patient calm and cooperative with interview. Mood is "good".   Denies SI, HI, AVH. Reports sleeping and eating well. No somatic complaints, no other complaints during interview. CAM-ICU neg.    Pt is A&O x 4, linear and organized.  Pt states he has a history of schizophrenia, denies ever taking an anti-psychotic, denies symptoms of psychosis or florin today.  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      "

## 2019-06-26 NOTE — NURSING
"Pt now agitated, raising voice, complains of "im in horrible pain, please help me! States back pain 10/10. Easily agitated, yelling at sitter. States all you wanna do is sit there!!!! Im ready to go, I have to get out of here!.   Numerous redirects, pt is currently waiting on a wedge and waffle, but continues to cry intermittently.   Notified Yimi Yuan MD, will call me back shortly after speaking to vascular neuro.  "

## 2019-06-26 NOTE — ASSESSMENT & PLAN NOTE
34 y/o IV drug abuser with large R ICH with IVH.  MRV negative.  Angiogram completed w/ no signs of AVM or aneurysm but concern for vasculitis. 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       Antithrombotics: None ICH    Statins: None ICH    Aggressive risk factor modification: drug use, smoker     Rehab efforts: The patient has been evaluated by a stroke team provider and the therapy needs have been fully considered based off the presenting complaints and exam findings. The following therapy evaluations are needed: PT evaluate and treat, OT evaluate and treat, SLP evaluate and treat, PM&R evaluate for appropriate placement    Diagnostics ordered/pending: CT for any changes in neurological status    VTE prophylaxis: SCDs only large ICH    BP parameters: ICH: SBP <160

## 2019-06-26 NOTE — PLAN OF CARE
Problem: Adult Inpatient Plan of Care  Goal: Plan of Care Review  Outcome: Revised  Pt free of falls/trauma/injuries.  Denies c/o SOB or CP. Discomfort managed with oral analgesics.  Generalized skin remains CDI; no edema noted. PT/OT following.  Pt tolerating plan of care. Will continue to monitor.

## 2019-06-26 NOTE — PROGRESS NOTES
[facility]  Vascular Neurology  Comprehensive Stroke Center  Progress Note    Assessment/Plan:     * Nontraumatic cortical hemorrhage of cerebral hemisphere  34 y/o IV drug abuser with large R ICH with IVH.  MRV negative.  Angiogram completed w/ no signs of AVM or aneurysm but concern for vasculitis. 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       Antithrombotics: None ICH    Statins: None ICH    Aggressive risk factor modification: drug use, smoker     Rehab efforts: The patient has been evaluated by a stroke team provider and the therapy needs have been fully considered based off the presenting complaints and exam findings. The following therapy evaluations are needed: PT evaluate and treat, OT evaluate and treat, SLP evaluate and treat, PM&R evaluate for appropriate placement    Diagnostics ordered/pending: CT for any changes in neurological status    VTE prophylaxis: SCDs only large ICH    BP parameters: ICH: SBP <160        Brain compression  Off HTS.   Management per primary     Current smoker  Stroke risk factor  Counseled on smoking cessation  Patient willing to quit  Will need resources for smoking cessation at discharge    Vasogenic cerebral edema  Area of vasogenic cerebral edema identified when reviewing brain imaging in the territory of the R middle cerebral artery. There is mass effect associated with it. We will continue to monitor the patients clinical exam for any worsening of symptoms which may indicate expansion of the stroke or the area of the edema resulting in the clinical change. The pattern is suggestive of drug induced vasculopathy etiology         Transaminitis  History of hep c  Hep panel ordered     Left hemiparesis  Due to ICH  Aggressive therapy when appropriate    Rhabdomyolysis  CPK trending down    Acute delirium  Psych consulted  Recommending to continue PEC at this time  Zyprexa ordered for non redirectable agitation      Leukocytosis  WBC  12.74   Off abx  No obvious source of infection at this time  procal WNL  RPR and STD labs ordered per psych recs    Drug abuse, IV  Stroke risk factor  Counseled on cessation and patient requesting resources for drug rehab         6/20:  Restless but cooperative. On 2% HTS  6/21: Patient continues on 2% and Cardene gtt. Following simple commands.   6/22: On 2% gtt. Off cardene gtt.   6/23: TCDs daily, results pending. Of note possible step down tomorrow.  6/24: patient off cardene and HTS, plans for step down  6/25: patient CEC'd by  on 6/18, needs rehab placement, no behavior issues or agitation, no longer requiring restraints, patient interested in drug rehab resources, TCD with mildly elevated velocities left MCA territory on 6/246/26: Pt stepped down overnight. Psych consulted yesterday and recommended continuing PEC. Zyprexa ordered PRN for non-redirectable agitation. STD work up as suggested including hep panel. Pleasant on exam this AM - cooperating for most part but continues to say sorry I am just tired.       STROKE DOCUMENTATION        NIH Scale:  1a. Level of Consciousness: 0-->Alert, keenly responsive  1b. LOC Questions: 0-->Answers both questions correctly  1c. LOC Commands: 0-->Performs both tasks correctly  2. Best Gaze: 2-->Forced deviation, or total gaze paresis not overcome by the oculocephalic maneuver  3. Visual: 2-->Complete hemianopia  4. Facial Palsy: 1-->Minor paralysis (flattened nasolabial fold, asymmetry on smiling)  5a. Motor Arm, Left: 4-->No movement  5b. Motor Arm, Right: 0-->No drift, limb holds 90 (or 45) degrees for full 10 secs  6a. Motor Leg, Left: 2-->Some effort against gravity, leg falls to bed by 5 secs, but has some effort against gravity  6b. Motor Leg, Right: 0-->No drift, leg holds 30 degree position for full 5 secs  7. Limb Ataxia: 0-->Absent  8. Sensory: 0-->Normal, no sensory loss  9. Best Language: 0-->No aphasia, normal  10. Dysarthria: 1-->Mild-to-moderate  dysarthria, patient slurs at least some words and, at worst, can be understood with some difficulty  11. Extinction and Inattention (formerly Neglect): 0-->No abnormality  Total (NIH Stroke Scale): 12       Modified Rochelle Score: 0  Palmer Coma Scale:    ABCD2 Score:    [id number] Score:   HAS -BLED Score:   ICH Score:3  Hunt & Dougherty Classification:      Hemorrhagic change of an Ischemic Stroke: Does this patient have an ischemic stroke with hemorrhagic changes? No     Neurologic Chief Complaint: Right ICH    Subjective:     Interval History: Patient is seen for follow-up neurological assessment and treatment recommendations:     Pt stepped down overnight. Psych consulted yesterday and recommended continuing PEC. Zyprexa ordered PRN for non-redirectable agitation. STD work up as suggested including hep panel. Pleasant on exam this AM - cooperating for most part but continues to say sorry I am just tired.     HPI, Past Medical, Family, and Social History remains the same as documented in the initial encounter.     Review of Systems   Constitutional: Negative for fever.   Eyes: Positive for visual disturbance.   Respiratory: Negative for shortness of breath.    Cardiovascular: Negative for chest pain.   Gastrointestinal: Negative for vomiting.   Neurological: Positive for facial asymmetry, speech difficulty and weakness. Negative for headaches.   Psychiatric/Behavioral: Negative for agitation and behavioral problems.     Scheduled Meds:   amLODIPine  5 mg Oral Daily    folic acid  1 mg Oral Daily    heparin (porcine)  5,000 Units Subcutaneous Q8H    senna-docusate 8.6-50 mg  1 tablet Oral BID    thiamine  100 mg Oral Daily     Continuous Infusions:   sodium chloride 0.9% 50 mL/hr at 06/25/19 2305     PRN Meds:acetaminophen, OLANZapine, ondansetron, sodium chloride 0.9%, sodium chloride 0.9%    Objective:     Vital Signs (Most Recent):  Temp: (Refused vitals) (06/26/19 0445)  Pulse: (!) 58 (06/26/19  0807)  Resp: 16 (06/26/19 0807)  BP: (!) 117/59 (06/26/19 0807)  SpO2: 96 % (06/26/19 0807)  BP Location: Left arm    Vital Signs Range (Last 24H):  Temp:  [97.8 °F (36.6 °C)-99.4 °F (37.4 °C)]   Pulse:  [54-83]   Resp:  [15-21]   BP: (100-133)/(59-81)   SpO2:  [87 %-100 %]   BP Location: Left arm    Physical Exam   Constitutional: He appears well-developed. No distress.   Underweight   HENT:   Head: Normocephalic and atraumatic.   Cardiovascular: Normal rate.   Pulmonary/Chest: Effort normal. No respiratory distress.   Neurological: He is alert.   Skin: Skin is warm.   Nursing note and vitals reviewed.      Neurological Exam:   LOC: alert  Attention Span: good  Language: No aphasia  Articulation: Dysarthria  Orientation: Not oriented to place, Not oriented to time  Visual Fields: L hemianopsia  EOM (CN III, IV, VI): Gaze preference  right  Pupils (CN II, III): PERRL  Facial Sensation (CN V): Normal  Facial Movement (CN VII): Lower facial weakness on the Left  Motor: LUE 0/5, RUE 5/5, RLE 5/5; LLE 3/5  Cebellar: No evidence of appendicular or axial ataxia  Sensation: intact bilaterally      Laboratory:  CMP:   Recent Labs   Lab 06/26/19  0315   CALCIUM 9.5   ALBUMIN 3.9   PROT 7.5      K 3.6   CO2 23      BUN 15   CREATININE 0.7   ALKPHOS 77   ALT 41   AST 25   BILITOT 0.7     BMP:   Recent Labs   Lab 06/26/19  0315      K 3.6      CO2 23   BUN 15   CREATININE 0.7   CALCIUM 9.5     CBC:   Recent Labs   Lab 06/26/19  0315   WBC 12.74*   RBC 5.40   HGB 18.4*   HCT 49.2   *   MCV 91   MCH 34.1*   MCHC 37.4*     Lipid Panel:   Recent Labs   Lab 06/19/19  1807   CHOL 144   LDLCALC 80.4   HDL 31*   TRIG 163*     Coagulation:   Recent Labs   Lab 06/20/19  0457 06/25/19  1507   INR 1.1 1.1   APTT 28.4  --      Hgb A1C:   Recent Labs   Lab 06/19/19  1807   HGBA1C 5.2     TSH:   Recent Labs   Lab 06/19/19  1807   TSH 1.015       Diagnostic Results     Brain imaging:  MRI 6/20/2019  Large  parenchymal hemorrhage centered within the bilateral parietal lobes extending across midline through the splenium of the corpus callosum corresponding to abnormality seen on CT.  There is thin subdural hemorrhage overlying the right cerebral hemisphere with questionable trace hemorrhage also overlying the left inferior frontal lobe.  Mass effect with approximate 7 mm of leftward midline shift with distortion of the ventricles similar to prior.  Trace prominence of the temporal horns lateral ventricles cannot exclude component of trapped ventricles and early hydrocephalus with remaining ventricular system relatively the normal in caliber.    CT head w/o contrast 6-19-19 results:  1. The study is significantly abnormal.  There is acute intracranial hemorrhage with large parenchymal hemorrhage in the posteromedial right occipital parietal lobe crossing over the splenium of the corpus callosum into the posteromedial left occipital parietal lobe.  There is moderate-to-marked associated edema.  There is approximately 6 mm right to left midline shift.  Subdural blood is also seen along the falx and extending inferiorly along the right tentorium.  The cerebellar tonsils are normal position.  There is compression and depression of the lateral ventricles with moderate to marked dilatation of the temporal horns.     Vessel Imaging:    Angiogram 6/21/2019  Preliminary interpretation: No evidence of aneurysm or AVM. Diffuse irregularity of multiple small and medium sized arteries with multiple areas of narrowing/beading raising question of CNS vasculitis or RCVS,. Please see Imaging report for full details.    MRA/MRV 6/20/2019  MRA head: Unremarkable motion limited MRA of the head specifically without definite abnormal flow related enhancement associated with the region of parenchymal hemorrhage partially included in the study to suggest definite associated vascular malformation.  Correlation and follow up repeat imaging when  patient better able to tolerate scanning advised.  Thin subdural hemorrhage overlies the right cerebral hemisphere.  MRV: Motion limited examination as detailed above with dominant right transverse and sigmoid sinuses.  The left sigmoid sinus is not seen and likely obscured by motion.    CTA Head and neck 6-19-19 results:    Acute intraparenchymal hematoma centered within right and left parietal lobes with extension across the corpus callosum.  Volume of hemorrhage is stable to slightly progressed in comparison to prior exam.  Slightly progressed edema and mass effect resulting in 8 cm leftward midline shift.  No new hemorrhage.    Stable dilatation of the temporal horns of lateral ventricles consistent mild hydrocephalus.    Small volume subdural hemorrhage layering along the falx and tentorium, unchanged    No evidence of large intracranial aneurysm or AVM.  Please note evaluation is limited secondary to extensive hemorrhage and mass effect.  Consider cerebral angiography for further evaluation, as clinically indicated.     Cardiac Evaluation:   EKG 6-19-19 results:  Normal sinus rhythm  Biatrial enlargement  Prolonged QT  Abnormal ECG  When compared with ECG of 16-JUN-2019 12:31,  Vent. rate has increased BY 34 BPM  Non-specific change in ST segment in Inferior leads  QT has lengthened      Doreen Pratt NP  [facility] [[email or web address]] Stroke Center  Department of Vascular Neurology   [facility]

## 2019-06-26 NOTE — ASSESSMENT & PLAN NOTE
Psych consulted  Recommending to continue PEC at this time  Zyprexa ordered for non redirectable agitation

## 2019-06-27 NOTE — PLAN OF CARE
06/27/19 1040   Post-Acute Status   Post-Acute Authorization Placement   Post-Acute Placement Status Referrals Sent       Pt no longer PEC/CEC'd.  Referrals sent to IRF's:    1. [place]:  2. [facility]:  3. [facility]:  4. [place]:  5. [place]:  6. [place]:      Awaiting acceptance.  SW in contact with CM and Medical staff. Will continue to follow and offer support as needed.     Michael Du, LMSW [facility]   Ext. [phone]

## 2019-06-27 NOTE — PROGRESS NOTES
Pt was found awake and alert sitting up in the locked chair with the chair alarm on, yellow-non skid socks, belongings close by, call light within reach, and fall risk arm band. Chair alarm kept going off due to pt shifting weight, so nurse told pt to stay in the chair since she was in the room giving medication. Pt stated he would stay in chair. Nurse left room.    1325  Nurse was in another pt room giving medication and walking in the hallway to get a cup of water. Nurse saw chair alarm light on outside the room so she stopped by. Pt was out of the chair with one arm on the bed and one arm on the chair and his feet on the ground. Nurse caught the pt before the rest of his body hit the ground. Other people helped nurse get pt back in the chair. Pt stated no complains of pain or discomfort.    1330  Nurse notified Dr. Jamil MD that pt had guided fall with no complaints of pain or discomfort. No new orders at this time.

## 2019-06-27 NOTE — PLAN OF CARE
Problem: SLP Goal  Goal: SLP Goal  SLP goals to be met by 7/4  1.Pt will tolerate diet of dental soft solids/ thin liquids with no overt signs of aspiration-met  2. Pt will complete simple L scanning tasks with 80% accuracy with max A.  3. Pt will complete orientation tasks with 90% accuracy and min cues to increase safety awareness  4. Pt will complete problem solving/reasoning tasks with 80% accuracy and min cues to increase safety awareness-met   5. Pt will participate in ongoing assessment of reading and writing   6. Pt will participate in ongoing assessment of cognitive-linguistic skills     Updated:  1. Pt will tolerate diet of regular solids/thin liquids with no overt signs of aspiration.   2. Pt will complete problem solving/reasoning tasks with 90% accuracy and min cues to increase safety awareness.    Outcome: Ongoing (interventions implemented as appropriate)  Goals remain appropriate, cont POC. MART Trujillo, CCC/SLP  6/27/2019

## 2019-06-27 NOTE — SUBJECTIVE & OBJECTIVE
Neurologic Chief Complaint: Right ICH    Subjective:     Interval History: Patient is seen for follow-up neurological assessment and treatment recommendations:     NAEON. Patient in pleasant mood this morning, motivated/interactive; per nursing patient easily redirected overnight, PRN zyprexa received, discontinued CEC per psych recs. Psych recommend STD panel; Hx of hep C, + hep C Ab, ordered HCV RNA.    HPI, Past Medical, Family, and Social History remains the same as documented in the initial encounter.     Review of Systems   Constitutional: Negative for fever.   Eyes: Positive for visual disturbance.   Respiratory: Negative for shortness of breath.    Cardiovascular: Negative for chest pain.   Gastrointestinal: Negative for vomiting.   Neurological: Positive for facial asymmetry, speech difficulty and weakness. Negative for headaches.   Psychiatric/Behavioral: Negative for agitation and behavioral problems.     Scheduled Meds:   amLODIPine  5 mg Oral Daily    folic acid  1 mg Oral Daily    heparin (porcine)  5,000 Units Subcutaneous Q8H    senna-docusate 8.6-50 mg  1 tablet Oral BID    thiamine  100 mg Oral Daily     Continuous Infusions:    PRN Meds:acetaminophen, OLANZapine, OLANZapine, ondansetron, sodium chloride 0.9%, sodium chloride 0.9%    Objective:     Vital Signs (Most Recent):  Temp: 98.7 °F (37.1 °C) (06/27/19 0723)  Pulse: 67 (06/27/19 0741)  Resp: 16 (06/27/19 0723)  BP: 128/69 (06/27/19 0723)  SpO2: 99 % (06/27/19 0723)  BP Location: Left arm    Vital Signs Range (Last 24H):  Temp:  [98.3 °F (36.8 °C)-98.8 °F (37.1 °C)]   Pulse:  [58-73]   Resp:  [16-18]   BP: (101-128)/(62-75)   SpO2:  [90 %-99 %]   BP Location: Left arm    Physical Exam   Constitutional: He appears well-developed. No distress.   Underweight   HENT:   Head: Normocephalic and atraumatic.   Cardiovascular: Normal rate.   Pulmonary/Chest: Effort normal. No respiratory distress.   Neurological: He is alert.   Skin: Skin is  warm.   Psychiatric:   In good spirits today, motivated to work with therapy    Nursing note and vitals reviewed.      Neurological Exam:   LOC: alert  Attention Span: good  Language: No aphasia  Articulation: Dysarthria  Orientation: Not oriented to place, Not oriented to time  Visual Fields: L hemianopsia  EOM (CN III, IV, VI): Gaze preference  Right able to cross midline  Pupils (CN II, III): PERRL  Facial Sensation (CN V): Normal  Facial Movement (CN VII): Lower facial weakness on the Left  Motor: LUE 0/5, RUE 5/5, RLE 5/5; LLE 3/5  Cebellar: No evidence of appendicular or axial ataxia  Sensation: intact bilaterally      Laboratory:  CMP:   No results for input(s): GLUCOSE, CALCIUM, ALBUMIN, PROT, NA, K, CO2, CL, BUN, CREATININE, ALKPHOS, ALT, AST, BILITOT in the last 24 hours.  BMP:   Recent Labs   Lab 06/26/19  0315      K 3.6      CO2 23   BUN 15   CREATININE 0.7   CALCIUM 9.5     CBC:   Recent Labs   Lab 06/26/19  0315   WBC 12.74*   RBC 5.40   HGB 18.4*   HCT 49.2   *   MCV 91   MCH 34.1*   MCHC 37.4*     Lipid Panel:   No results for input(s): CHOL, LDLCALC, HDL, TRIG in the last 168 hours.  Coagulation:   Recent Labs   Lab 06/25/19  1507   INR 1.1     Hgb A1C:   No results for input(s): HGBA1C in the last 168 hours.  TSH:   No results for input(s): TSH in the last 168 hours.    Diagnostic Results     Brain imaging:  MRI 6/20/2019  Large parenchymal hemorrhage centered within the bilateral parietal lobes extending across midline through the splenium of the corpus callosum corresponding to abnormality seen on CT.  There is thin subdural hemorrhage overlying the right cerebral hemisphere with questionable trace hemorrhage also overlying the left inferior frontal lobe.  Mass effect with approximate 7 mm of leftward midline shift with distortion of the ventricles similar to prior.  Trace prominence of the temporal horns lateral ventricles cannot exclude component of trapped ventricles and  early hydrocephalus with remaining ventricular system relatively the normal in caliber.    CT head w/o contrast 6-19-19 results:  1. The study is significantly abnormal.  There is acute intracranial hemorrhage with large parenchymal hemorrhage in the posteromedial right occipital parietal lobe crossing over the splenium of the corpus callosum into the posteromedial left occipital parietal lobe.  There is moderate-to-marked associated edema.  There is approximately 6 mm right to left midline shift.  Subdural blood is also seen along the falx and extending inferiorly along the right tentorium.  The cerebellar tonsils are normal position.  There is compression and depression of the lateral ventricles with moderate to marked dilatation of the temporal horns.     Vessel Imaging:    Angiogram 6/21/2019  Preliminary interpretation: No evidence of aneurysm or AVM. Diffuse irregularity of multiple small and medium sized arteries with multiple areas of narrowing/beading raising question of CNS vasculitis or RCVS,. Please see Imaging report for full details.    MRA/MRV 6/20/2019  MRA head: Unremarkable motion limited MRA of the head specifically without definite abnormal flow related enhancement associated with the region of parenchymal hemorrhage partially included in the study to suggest definite associated vascular malformation.  Correlation and follow up repeat imaging when patient better able to tolerate scanning advised.  Thin subdural hemorrhage overlies the right cerebral hemisphere.  MRV: Motion limited examination as detailed above with dominant right transverse and sigmoid sinuses.  The left sigmoid sinus is not seen and likely obscured by motion.    CTA Head and neck 6-19-19 results:    Acute intraparenchymal hematoma centered within right and left parietal lobes with extension across the corpus callosum.  Volume of hemorrhage is stable to slightly progressed in comparison to prior exam.  Slightly progressed edema  and mass effect resulting in 8 cm leftward midline shift.  No new hemorrhage.    Stable dilatation of the temporal horns of lateral ventricles consistent mild hydrocephalus.    Small volume subdural hemorrhage layering along the falx and tentorium, unchanged    No evidence of large intracranial aneurysm or AVM.  Please note evaluation is limited secondary to extensive hemorrhage and mass effect.  Consider cerebral angiography for further evaluation, as clinically indicated.     Cardiac Evaluation:   TTE 6/20/19  · Normal left ventricular systolic function. The estimated ejection fraction is 65%  · Normal right ventricular systolic function.  · Normal LV diastolic function.  · No wall motion abnormalities.  · Normal central venous pressure (3 mm Hg).  · Normal size left atrium     EKG 6-19-19 results:  Normal sinus rhythm  Biatrial enlargement  Prolonged QT  Abnormal ECG  When compared with ECG of 16-JUN-2019 12:31,  Vent. rate has increased BY 34 BPM  Non-specific change in ST segment in Inferior leads  QT has lengthened

## 2019-06-27 NOTE — PT/OT/SLP PROGRESS
"Speech Language Pathology Treatment    Patient Name:  Shahid Lackey   MRN:  [id number]  Admitting Diagnosis: Nontraumatic cortical hemorrhage of cerebral hemisphere    Recommendations:                 General Recommendations:  Dysphagia therapy, Speech/language therapy and Cognitive-linguistic therapy  Diet recommendations:  Regular, Liquid Diet Level: Thin   Aspiration Precautions: Strict aspiration precautions   General Precautions: Standard, aspiration, fall  Communication strategies:  none, provide increased time to answer and go to room if call light pushed    Subjective     "I never thought this would happen to me."     Pain/Comfort:  · Pain Rating 1: (intermittent pain in R foot)  · Pain Rating Post-Intervention 1: (no change)    Objective:     Has the patient been evaluated by SLP for swallowing?   Yes  Keep patient NPO? No   Current Respiratory Status: room air      Pt seen bedside with sitter present.  Pt calm and cooperative t/o session.  Continued decreased sustained attn with tangential, perseverative speech noted intermittently though pt easily redirected to task.  Pt with good eye contact and visual attn to SLP on L.  Sentence read at midline with min-mod A.  Visual scanning task of 10 stimulus items completed with max A including barrier line.  Pt with noted increased difficult with items on lower quadrant of page compared to upper.  Although max cues required, pt was able to locate all items.  He oriented to self, situation and place indply.  Max A and external aid required to orient to date.  Pt with appropriate questions re: deficits, progress and rehab process.  All questions addressed with education re: orientation and L attn compensatory strategies provided.  Pt remains with some verbalized confusion and confabulation.  Education will be ongoing.      Assessment:     Shahid Lackey is a 33 y.o. male with an SLP diagnosis of Cognitive-Linguistic Impairment and Visio-Spatial Impairment.  "     Goals:   Multidisciplinary Problems     SLP Goals        Problem: SLP Goal    Goal Priority Disciplines Outcome   SLP Goal     SLP Ongoing (interventions implemented as appropriate)   Description:  SLP goals to be met by 7/4  1.Pt will tolerate diet of dental soft solids/ thin liquids with no overt signs of aspiration-met  2. Pt will complete simple L scanning tasks with 80% accuracy with max A.  3. Pt will complete orientation tasks with 90% accuracy and min cues to increase safety awareness  4. Pt will complete problem solving/reasoning tasks with 80% accuracy and min cues to increase safety awareness-met   5. Pt will participate in ongoing assessment of reading and writing   6. Pt will participate in ongoing assessment of cognitive-linguistic skills     Updated:  1. Pt will tolerate diet of regular solids/thin liquids with no overt signs of aspiration.   2. Pt will complete problem solving/reasoning tasks with 90% accuracy and min cues to increase safety awareness.                      Plan:     · Patient to be seen:  4 x/week   · Plan of Care expires:  07/19/19  · Plan of Care reviewed with:  patient   · SLP Follow-Up:  Yes       Discharge recommendations:  rehabilitation facility   Barriers to Discharge:  Level of Skilled Assistance Needed      Time Tracking:     SLP Treatment Date:   06/27/19  Speech Start Time:  0823  Speech Stop Time:  0840     Speech Total Time (min):  17 min    Billable Minutes: Speech Therapy Individual 9 and Seld Care/Home Management Training 8    MART Trujillo, CCC-SLP  06/27/2019

## 2019-06-27 NOTE — NURSING
"Pt agitated, complains of pain, crying out and stating "I can't live like this, you have to do something!!" After trying to redirect pt twice, unable to calm. Administered PRN med as ordered. Pt tono well. Will continue to monitor.  "

## 2019-06-27 NOTE — PLAN OF CARE
06/27/19 1328   Post-Acute Status   Post-Acute Authorization Placement   Post-Acute Placement Status Pending Payor Review       Pt has been accepted by [facility] ([phone]). They are submitting for auth today.  SW in contact with CM and Medical staff. Will continue to follow and offer support as needed.     Michael Du LMSW  [facility]   Ext. [phone]

## 2019-06-27 NOTE — PLAN OF CARE
Problem: Physical Therapy Goal  Goal: Physical Therapy Goal  Goals to be met by: 2019 (10 days)     Patient will increase functional independence with mobility by performin. Supine to sit with Moderate Assistance  2. Sit to supine with Moderate Assistance  3. Sit to stand transfer with maximum assistance- Met   Sit to stand with minimum assistance    4. Gait  x 5 feet with Maximum Assistance using least restrictive device   5. Sitting at edge of bed x10 minutes with Minimal Assistance  6. Lower extremity exercise program x20 reps per handout, with assistance as needed to improve strength and ROM and increase activity tolerance.      Outcome: Ongoing (interventions implemented as appropriate)  Continue with plan of care.   Aura Cameron, PT  2019

## 2019-06-27 NOTE — ASSESSMENT & PLAN NOTE
32 y/o IV drug abuser with large R ICH with IVH.  MRV negative.  Angiogram completed w/ no signs of AVM or aneurysm but concern for vasculitis. 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       Antithrombotics: None ICH    Statins: None ICH    Aggressive risk factor modification: drug use, smoker     Rehab efforts: The patient has been evaluated by a stroke team provider and the therapy needs have been fully considered based off the presenting complaints and exam findings. The following therapy evaluations are needed: PT evaluate and treat, OT evaluate and treat, SLP evaluate and treat, PM&R evaluate for appropriate placement    Diagnostics ordered/pending: CT for any changes in neurological status    VTE prophylaxis: SCDs only large ICH    BP parameters: ICH: SBP <160

## 2019-06-27 NOTE — ASSESSMENT & PLAN NOTE
Stroke risk factor  Counseled on cessation and patient requesting resources for drug rehab  Provide resources when appropriate

## 2019-06-27 NOTE — PT/OT/SLP PROGRESS
"Occupational Therapy   Treatment    Name: Shahid Lackey  MRN: [id number]  Admitting Diagnosis:  Nontraumatic cortical hemorrhage of cerebral hemisphere       Recommendations:     Discharge Recommendations: rehabilitation facility  Discharge Equipment Recommendations:  hospital bed, tub bench, wheelchair, manual  Barriers to discharge:  Inaccessible home environment, Decreased caregiver support    Assessment:     Shahid Lackey is a 33 y.o. male with a medical diagnosis of Nontraumatic cortical hemorrhage of cerebral hemisphere.  He presents with performance deficits affecting function are weakness, impaired sensation, impaired functional mobilty, impaired balance, impaired cognition, decreased upper extremity function, decreased safety awareness, abnormal tone, impaired coordination, impaired skin, impaired endurance, impaired self care skills, gait instability, decreased coordination, decreased lower extremity function, decreased ROM, impaired fine motor.     Rehab Prognosis:  Good; patient would benefit from acute skilled OT services to address these deficits and reach maximum level of function.       Plan:     Patient to be seen 4 x/week to address the above listed problems via self-care/home management, therapeutic exercises, therapeutic activities, therapeutic groups, neuromuscular re-education, sensory integration, cognitive retraining  · Plan of Care Expires: 07/19/19  · Plan of Care Reviewed with: patient    Subjective   Patient:  "I'll try my best."  Pain/Comfort:  · Pain Rating 1: 0/10  · Pain Rating Post-Intervention 1: 0/10    Objective:     Communicated with: Nurse prior to session.  Patient found supine with peripheral IV, pressure relief boots, telemetry upon OT entry to room.  Family not present.  General Precautions: Standard, aspiration, fall   Orthopedic Precautions:N/A   Braces: N/A     Occupational Performance:     Bed Mobility:    · Patient completed Rolling/Turning to Right with moderate " assistance  · Patient completed Scooting/Bridging with moderate assistance  · Patient completed Supine to Sit with total assistance  · Patient completed Sit to Supine with total assistance     Functional Mobility/Transfers:  · Patient completed Sit <> Stand Transfer with maximal assistance  with  no assistive device   · Patient completed Bed <> Chair Transfer using Stand Pivot technique with maximal assistance with no assistive device    Activities of Daily Living:  · Grooming: total assistance while seated EOB  · Upper Body Dressing: total assistance while seated EOB  · Lower Body Dressing: total assistance while seated EOB      [facility] 6 Click ADL: 6    Treatment & Education:  PROM performed left UE/LEs one set x 10 rep in all planes of motion with stretches provided at end range; sustained stretch provided for left UE external rotation, supination, shoulder flexion and ankle dorsiflexion; assistance and facilitation provided for upward rotation of the scapula during shoulder flexion and abduction.  Min-Total assist required with postural control while seated EOB with left UE in weight bearing.  Patient initiating most movements with trunk extension.  Addressed left UE weight bearing while seated EOB.  Positioning provided for midline orientation with bilateral UEs elevated and heels lifted off mattress.  Gentle cervical rotation provided to the left.   Family not present during the session.  Patient's functional status and disposition recommendation discussed with stroke team during daily rounds.  White board updated in patient's room.     Patient left supine with all lines intact, call button in reach and bed alarm onEducation:      GOALS:   Multidisciplinary Problems     Occupational Therapy Goals        Problem: Occupational Therapy Goal    Goal Priority Disciplines Outcome Interventions   Occupational Therapy Goal     OT, PT/OT     Description:  Goals set 6/27 to be addressed for 14 days with expiration date,  7/11:  Patient will increase functional independence with ADLs by performing:    Patient will demonstrate rolling to the right with min assist.  Not met   Patient will demonstrate rolling to the left with min assist.   Not met  Patient will demonstrate supine -sit with mod assist.   Not met  Patient will demonstrate stand pivot transfers with mod assist.   Not met  Patient will demonstrate grooming while seated with min assist.   Not met  Patient will demonstrate upper body dressing with mod assist while seated EOB.   Not met  Patient will demonstrate lower body dressing with mod assist while seated EOB.   Not met  Patient will demonstrate toileting with mod assist.   Not met  Patient will demonstrate bathing while seated EOB with mod assist.   Not met  Patient's family / caregiver will demonstrate independence and safety with assisting patient with self-care skills and functional mobility.     Not met  Patient's family / caregiver will demonstrate independence with providing ROM and changes in bed positioning.   Not met                         Time Tracking:     OT Date of Treatment: 06/27/19  OT Start Time: 0930  OT Stop Time: 1008  OT Total Time (min): 38 min    Billable Minutes:Self Care/Home Management 10  Neuromuscular Re-education 28    LISA Barrios  6/27/2019

## 2019-06-27 NOTE — ASSESSMENT & PLAN NOTE
Psych consulted  Improved - Discontinued CEC 6/27  Zyprexa ordered for non redirectable agitation

## 2019-06-27 NOTE — PROGRESS NOTES
[facility]  Vascular Neurology  Comprehensive Stroke Center  Progress Note    Assessment/Plan:     * Nontraumatic cortical hemorrhage of cerebral hemisphere  34 y/o IV drug abuser with large R ICH with IVH.  MRV negative.  Angiogram completed w/ no signs of AVM or aneurysm but concern for vasculitis. 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       Antithrombotics: None ICH    Statins: None ICH    Aggressive risk factor modification: drug use, smoker     Rehab efforts: The patient has been evaluated by a stroke team provider and the therapy needs have been fully considered based off the presenting complaints and exam findings. The following therapy evaluations are needed: PT evaluate and treat, OT evaluate and treat, SLP evaluate and treat, PM&R evaluate for appropriate placement    Diagnostics ordered/pending: CT for any changes in neurological status    VTE prophylaxis: SCDs only large ICH    BP parameters: ICH: SBP <160        Brain compression  HTS discontinued in [facility] 6/22      Current smoker  Stroke risk factor  Counseled on smoking cessation  Patient willing to quit  Will need resources for smoking cessation at discharge    Vasogenic cerebral edema  Area of vasogenic cerebral edema identified when reviewing brain imaging in the territory of the R middle cerebral artery. There is mass effect associated with it. We will continue to monitor the patients clinical exam for any worsening of symptoms which may indicate expansion of the stroke or the area of the edema resulting in the clinical change. The pattern is suggestive of drug induced vasculopathy etiology         Transaminitis  History of hep c  Hep panel ordered - + Hep C Ab, ordered HCV RNA.     Left hemiparesis  Due to ICH  Aggressive therapy     Rhabdomyolysis  CPK trending down    Acute delirium  Psych consulted  Improved - Discontinued CEC 6/27  Zyprexa ordered for non redirectable  agitation      Leukocytosis  WBC 12.74   Off abx  No obvious source of infection at this time  procal WNL  RPR and STD labs ordered per psych recs     Drug abuse, IV  Stroke risk factor  Counseled on cessation and patient requesting resources for drug rehab  Provide resources when appropriate          6/20:  Restless but cooperative. On 2% HTS  6/21: Patient continues on 2% and Cardene gtt. Following simple commands.   6/22: On 2% gtt. Off cardene gtt.   6/23: TCDs daily, results pending. Of note possible step down tomorrow.  6/24: patient off cardene and HTS, plans for step down  6/25: patient CEC'd by  on 6/18, needs rehab placement, no behavior issues or agitation, no longer requiring restraints, patient interested in drug rehab resources, TCD with mildly elevated velocities left MCA territory on 6/24 6/26: Pt stepped down overnight. Psych consulted yesterday and recommended continuing PEC. Zyprexa ordered PRN for non-redirectable agitation. STD work up as suggested including hep panel. Pleasant on exam this AM - cooperating for most part but continues to say sorry I am just tired. 6/27: NAEON. Patient in pleasant mood this morning, motivated/interactive; per nursing patient easily redirected overnight, PRN zyprexa received, discontinued CEC per psych recs. Psych recommend STD panel; Hx of hep C, + hep C Ab, ordered HCV RNA.      STROKE DOCUMENTATION        NIH Scale:  1a. Level of Consciousness: 0-->Alert, keenly responsive  1b. LOC Questions: 0-->Answers both questions correctly  1c. LOC Commands: 0-->Performs both tasks correctly  2. Best Gaze: 1-->Partial gaze palsy, gaze is abnormal in one or both eyes, but forced deviation or total gaze paresis is not present  3. Visual: 1-->Partial hemianopia  4. Facial Palsy: 1-->Minor paralysis (flattened nasolabial fold, asymmetry on smiling)  5a. Motor Arm, Left: 3-->No effort against gravity, limb falls(minimal movement to fingers/squeeze and release hand on  command)  5b. Motor Arm, Right: 0-->No drift, limb holds 90 (or 45) degrees for full 10 secs  6a. Motor Leg, Left: 2-->Some effort against gravity, leg falls to bed by 5 secs, but has some effort against gravity  6b. Motor Leg, Right: 0-->No drift, leg holds 30 degree position for full 5 secs  7. Limb Ataxia: 0-->Absent  8. Sensory: 0-->Normal, no sensory loss  9. Best Language: 0-->No aphasia, normal  10. Dysarthria: 1-->Mild-to-moderate dysarthria, patient slurs at least some words and, at worst, can be understood with some difficulty  11. Extinction and Inattention (formerly Neglect): 0-->No abnormality  Total (NIH Stroke Scale): 9       Modified Morehouse Score: 0  Catherine Coma Scale:    ABCD2 Score:    [id number] Score:   HAS -BLED Score:   ICH Score:3  Hunt & Dougherty Classification:      Hemorrhagic change of an Ischemic Stroke: Does this patient have an ischemic stroke with hemorrhagic changes? No     Neurologic Chief Complaint: Right ICH    Subjective:     Interval History: Patient is seen for follow-up neurological assessment and treatment recommendations:     NAEON. Patient in pleasant mood this morning, motivated/interactive; per nursing patient easily redirected overnight, PRN zyprexa received, discontinued CEC per psych recs. Psych recommend STD panel; Hx of hep C, + hep C Ab, ordered HCV RNA.    HPI, Past Medical, Family, and Social History remains the same as documented in the initial encounter.     Review of Systems   Constitutional: Negative for fever.   Eyes: Positive for visual disturbance.   Respiratory: Negative for shortness of breath.    Cardiovascular: Negative for chest pain.   Gastrointestinal: Negative for vomiting.   Neurological: Positive for facial asymmetry, speech difficulty and weakness. Negative for headaches.   Psychiatric/Behavioral: Negative for agitation and behavioral problems.     Scheduled Meds:   amLODIPine  5 mg Oral Daily    folic acid  1 mg Oral Daily    heparin (porcine)   5,000 Units Subcutaneous Q8H    senna-docusate 8.6-50 mg  1 tablet Oral BID    thiamine  100 mg Oral Daily     Continuous Infusions:    PRN Meds:acetaminophen, OLANZapine, OLANZapine, ondansetron, sodium chloride 0.9%, sodium chloride 0.9%    Objective:     Vital Signs (Most Recent):  Temp: 98.7 °F (37.1 °C) (06/27/19 0723)  Pulse: 67 (06/27/19 0741)  Resp: 16 (06/27/19 0723)  BP: 128/69 (06/27/19 0723)  SpO2: 99 % (06/27/19 0723)  BP Location: Left arm    Vital Signs Range (Last 24H):  Temp:  [98.3 °F (36.8 °C)-98.8 °F (37.1 °C)]   Pulse:  [58-73]   Resp:  [16-18]   BP: (101-128)/(62-75)   SpO2:  [90 %-99 %]   BP Location: Left arm    Physical Exam   Constitutional: He appears well-developed. No distress.   Underweight   HENT:   Head: Normocephalic and atraumatic.   Cardiovascular: Normal rate.   Pulmonary/Chest: Effort normal. No respiratory distress.   Neurological: He is alert.   Skin: Skin is warm.   Psychiatric:   In good spirits today, motivated to work with therapy    Nursing note and vitals reviewed.      Neurological Exam:   LOC: alert  Attention Span: good  Language: No aphasia  Articulation: Dysarthria  Orientation: Not oriented to place, Not oriented to time  Visual Fields: L hemianopsia  EOM (CN III, IV, VI): Gaze preference  Right able to cross midline  Pupils (CN II, III): PERRL  Facial Sensation (CN V): Normal  Facial Movement (CN VII): Lower facial weakness on the Left  Motor: LUE 0/5, RUE 5/5, RLE 5/5; LLE 3/5  Cebellar: No evidence of appendicular or axial ataxia  Sensation: intact bilaterally      Laboratory:  CMP:   No results for input(s): GLUCOSE, CALCIUM, ALBUMIN, PROT, NA, K, CO2, CL, BUN, CREATININE, ALKPHOS, ALT, AST, BILITOT in the last 24 hours.  BMP:   Recent Labs   Lab 06/26/19  0315      K 3.6      CO2 23   BUN 15   CREATININE 0.7   CALCIUM 9.5     CBC:   Recent Labs   Lab 06/26/19  0315   WBC 12.74*   RBC 5.40   HGB 18.4*   HCT 49.2   *   MCV 91   MCH 34.1*    MCHC 37.4*     Lipid Panel:   No results for input(s): CHOL, LDLCALC, HDL, TRIG in the last 168 hours.  Coagulation:   Recent Labs   Lab 06/25/19  1507   INR 1.1     Hgb A1C:   No results for input(s): HGBA1C in the last 168 hours.  TSH:   No results for input(s): TSH in the last 168 hours.    Diagnostic Results     Brain imaging:  MRI 6/20/2019  Large parenchymal hemorrhage centered within the bilateral parietal lobes extending across midline through the splenium of the corpus callosum corresponding to abnormality seen on CT.  There is thin subdural hemorrhage overlying the right cerebral hemisphere with questionable trace hemorrhage also overlying the left inferior frontal lobe.  Mass effect with approximate 7 mm of leftward midline shift with distortion of the ventricles similar to prior.  Trace prominence of the temporal horns lateral ventricles cannot exclude component of trapped ventricles and early hydrocephalus with remaining ventricular system relatively the normal in caliber.    CT head w/o contrast 6-19-19 results:  1. The study is significantly abnormal.  There is acute intracranial hemorrhage with large parenchymal hemorrhage in the posteromedial right occipital parietal lobe crossing over the splenium of the corpus callosum into the posteromedial left occipital parietal lobe.  There is moderate-to-marked associated edema.  There is approximately 6 mm right to left midline shift.  Subdural blood is also seen along the falx and extending inferiorly along the right tentorium.  The cerebellar tonsils are normal position.  There is compression and depression of the lateral ventricles with moderate to marked dilatation of the temporal horns.     Vessel Imaging:    Angiogram 6/21/2019  Preliminary interpretation: No evidence of aneurysm or AVM. Diffuse irregularity of multiple small and medium sized arteries with multiple areas of narrowing/beading raising question of CNS vasculitis or RCVS,. Please see  Imaging report for full details.    MRA/MRV 6/20/2019  MRA head: Unremarkable motion limited MRA of the head specifically without definite abnormal flow related enhancement associated with the region of parenchymal hemorrhage partially included in the study to suggest definite associated vascular malformation.  Correlation and follow up repeat imaging when patient better able to tolerate scanning advised.  Thin subdural hemorrhage overlies the right cerebral hemisphere.  MRV: Motion limited examination as detailed above with dominant right transverse and sigmoid sinuses.  The left sigmoid sinus is not seen and likely obscured by motion.    CTA Head and neck 6-19-19 results:    Acute intraparenchymal hematoma centered within right and left parietal lobes with extension across the corpus callosum.  Volume of hemorrhage is stable to slightly progressed in comparison to prior exam.  Slightly progressed edema and mass effect resulting in 8 cm leftward midline shift.  No new hemorrhage.    Stable dilatation of the temporal horns of lateral ventricles consistent mild hydrocephalus.    Small volume subdural hemorrhage layering along the falx and tentorium, unchanged    No evidence of large intracranial aneurysm or AVM.  Please note evaluation is limited secondary to extensive hemorrhage and mass effect.  Consider cerebral angiography for further evaluation, as clinically indicated.     Cardiac Evaluation:   TTE 6/20/19  · Normal left ventricular systolic function. The estimated ejection fraction is 65%  · Normal right ventricular systolic function.  · Normal LV diastolic function.  · No wall motion abnormalities.  · Normal central venous pressure (3 mm Hg).  · Normal size left atrium     EKG 6-19-19 results:  Normal sinus rhythm  Biatrial enlargement  Prolonged QT  Abnormal ECG  When compared with ECG of 16-JUN-2019 12:31,  Vent. rate has increased BY 34 BPM  Non-specific change in ST segment in Inferior leads  QT has  lengthened      Gerald Weber NP  [facility] St[facility]  Department of Vascular Neurology   [facility]

## 2019-06-27 NOTE — PLAN OF CARE
Problem: Adult Inpatient Plan of Care  Goal: Plan of Care Review  Outcome: Ongoing (interventions implemented as appropriate)  Pt remains free of falls or injuries. Pt has sister at bedside, plan of care reviewed. Pt mood is still labile, but directable. Sister voices understanding of plan of care. Pt compliant with all meds and requests. Appropriate with staff. Sitter remains at bedside, will continue to monitor.
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
## 2019-06-27 NOTE — PLAN OF CARE
Problem: Adult Inpatient Plan of Care  Goal: Plan of Care Review  Outcome: Revised  Pt free of falls/trauma/injuries.  Denies c/o SOB or CP. Discomfort managed with oral analgesics.  Generalized skin remains CDI; no edema noted. PT/OT following;PT got pt up in chair with 1-person assist.  Pt tolerating plan of care. Pt PEC/CEC orders discontinued. Will continue to monitor.
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
## 2019-06-27 NOTE — PT/OT/SLP PROGRESS
"Physical Therapy Treatment    Patient Name:  Shahid Lackey   MRN:  [id number]    Recommendations:     Discharge Recommendations:  rehabilitation facility   Discharge Equipment Recommendations: tub bench, wheelchair, manual, hospital bed   Barriers to discharge: Inaccessible home, Decreased caregiver support and patient below functional baseline    Assessment:     Shahid Lackey is a 33 y.o. male admitted with a medical diagnosis of Nontraumatic cortical hemorrhage of cerebral hemisphere.  He presents with the following impairments/functional limitations:  weakness, impaired endurance, impaired sensation, impaired self care skills, impaired balance, gait instability, impaired functional mobilty, decreased coordination, decreased upper extremity function, decreased lower extremity function, visual deficits, decreased ROM, abnormal tone, decreased safety awareness, impaired fine motor, impaired coordination, impaired muscle length. The patient presents with L spastic hemiparesis, L inattention, impaired trunk control and midline awareness. The patient shows improvement in cognition and motivation to participate in therapy stating "I know I have to fight to get better and stronger, I want to do this". He shows improvement in functional mobility, performed sit to stand 3 reps with maximum assistance and squat pivot transfer to chair with maximum assistance. He is safe to transfer with 2 person RN assist and would benefit from RN mobility protocol.  Based on their current level of function and prior level of independence, they would benefit from inpatient rehab to maximize their functional potential.      Rehab Prognosis: Good; patient would benefit from acute skilled PT services to address these deficits and reach maximum level of function.    Recent Surgery: * No surgery found *      Plan:     During this hospitalization, patient to be seen 4 x/week to address the identified rehab impairments via gait training, " "therapeutic activities, therapeutic exercises, neuromuscular re-education and progress toward the following goals:    · Plan of Care Expires:  07/20/19    Subjective     Chief Complaint: "They give you such messy food, I'm sorry I made a mess", "Thank you for all that you are doing to help me"  Patient/Family Comments/goals: "I want to work with you to get stronger"  Pain/Comfort:  · Pain Rating 1: 0/10      Objective:     Communicated with RN prior to session.  Patient found HOB elevated with bed alarm, telemetry, peripheral IV, SCD upon PT entry to room. Patient no longer PEC.     General Precautions: Standard, fall, aspiration   Orthopedic Precautions:N/A   Braces: N/A     Functional Mobility:    Bed Mobility  Rolling to L: moderate assistance, cues for use of rail, set up  Supine to Sit:  maximum assistance, cued to use R leg to assist L, needs assist with legs and trunk    Transfers Sit to Stand:  maximum assistance, cues and assist for set up, needs hand over hand for L hand in weight bearing on chair, L foot inverted and plantarflexed, assist to maintain foot in weight bearing. Cues for anterior weight shift.  Squat pivot bed to chair on R: maximum assistance, patient assisting with reaching to chair     Sitting edge of bed 5 minutes, moderate assistance, weight shift posterior and to L  -Posterior pelvic tilt, kyphosis, R cervical rotation and flexion  -Visual/verbal/manual cues for midline orientation, thoracic and cervical extension  -Hand over hand placement of L hand in weightbearing, cued repeatedly to maintain R hand in lap or holding rail  -Cues for visual tracking to midline    Standing balance, maximum assistance, 30 sec intervals x3  - Forward flexed at trunk and hips, decreased L leg weight bearing, L knee flexed  -Manual/verbal cues and facilitation for hip extension, trunk extension, cues to look up  -Manual/verbal cues for quad and glute engagement  -Blocking L knee, buckling with increased " "weight bearing             AM-PAC 6 CLICK MOBILITY  Turning over in bed (including adjusting bedclothes, sheets and blankets)?: 2  Sitting down on and standing up from a chair with arms (e.g., wheelchair, bedside commode, etc.): 2  Moving from lying on back to sitting on the side of the bed?: 2  Moving to and from a bed to a chair (including a wheelchair)?: 2  Need to walk in hospital room?: 1  Climbing 3-5 steps with a railing?: 1  Basic Mobility Total Score: 10       Therapeutic Activities and Exercises:   Patient educated on role of therapy, goals of session, benefits of out of bed mobility. Patient agreeable to mobilize with therapy.  Discussed PT plan of care during hospitalization. Patient educated that they need to call for assistance to mobilize out of bed due to fall risk, RN notified patient up in chair, chair alarm on. Whiteboard updated as appropriate. Patient educated on how their diagnosis impacts their mobility within PT scope of practice.   Performed AAROM to L leg hip/knee flex/extension; 10 reps; L hamstring/calf stretch with emphasis on ankle DF/eversion; 30" x3- patient reports feeling tightness in calf.   Patient educated on L inattention, importance of consciously turning head to look at L side of his body and environment- patient expressed understanding, continues to need cuing for visual scanning.     Patient left up in chair with all lines intact, call button in reach, chair alarm on and RN notified..    GOALS:   Multidisciplinary Problems     Physical Therapy Goals        Problem: Physical Therapy Goal    Goal Priority Disciplines Outcome Goal Variances Interventions   Physical Therapy Goal     PT, PT/OT Ongoing (interventions implemented as appropriate)     Description:  Goals to be met by: 2019 (10 days)     Patient will increase functional independence with mobility by performin. Supine to sit with Moderate Assistance  2. Sit to supine with Moderate Assistance  3. Sit to " stand transfer with maximum assistance- Met 6/27  Sit to stand with minimum assistance    4. Gait  x 5 feet with Maximum Assistance using least restrictive device   5. Sitting at edge of bed x10 minutes with Minimal Assistance  6. Lower extremity exercise program x20 reps per handout, with assistance as needed to improve strength and ROM and increase activity tolerance.                        Time Tracking:     PT Received On: 06/27/19  PT Start Time: 1320     PT Stop Time: 1347  PT Total Time (min): 27 min     Billable Minutes: Neuromuscular Re-education 27    Treatment Type: Treatment  PT/PTA: PT     PTA Visit Number: 0     Aura Cameron, PT  06/27/2019

## 2019-06-27 NOTE — SUBJECTIVE & OBJECTIVE
"Interval History: as above.    Family History     Problem Relation (Age of Onset)    No Known Problems Mother, Father        Tobacco Use    Smoking status: Current Every Day Smoker     Packs/day: 1.00    Smokeless tobacco: Never Used   Substance and Sexual Activity    Alcohol use: No    Drug use: Yes     Types: IV    Sexual activity: Not Currently     Psychotherapeutics (From admission, onward)    Start     Stop Route Frequency Ordered    06/26/19 1711  OLANZapine injection 5 mg      -- IM Every 8 hours PRN 06/26/19 1622    06/26/19 0815  OLANZapine tablet 5 mg      -- Oral Every 6 hours PRN 06/26/19 0716           Review of Systems  Objective:     Vital Signs (Most Recent):  Temp: 98.7 °F (37.1 °C) (06/27/19 0723)  Pulse: 106 (06/27/19 1000)  Resp: 16 (06/27/19 0723)  BP: 128/69 (06/27/19 0723)  SpO2: 99 % (06/27/19 0723) Vital Signs (24h Range):  Temp:  [98.3 °F (36.8 °C)-98.8 °F (37.1 °C)] 98.7 °F (37.1 °C)  Pulse:  [] 106  Resp:  [16-18] 16  SpO2:  [90 %-99 %] 99 %  BP: (101-128)/(62-75) 128/69     Height: 5' 10" (177.8 cm)  Weight: 59.4 kg (131 lb)  Body mass index is 18.8 kg/m².      Intake/Output Summary (Last 24 hours) at 6/27/2019 1118  Last data filed at 6/26/2019 2100  Gross per 24 hour   Intake 1070 ml   Output 1250 ml   Net -180 ml       Physical Exam   Psychiatric:   Mental Status Exam:  Appearance: unremarkable, age appropriate  Behavior: normal, cooperative  Speech/Language: normal tone, normal rate, normal pitch, normal volume  Mood: steady  Affect: Normal   Thought Process:  normal and logical  Thought Content: normal, no suicidality, no homicidality, delusions, or paranoia   Orientation: grossly intact  Cognition: grossly intact  Insight:    poor  Judgment: poor            Significant Labs:   Last 24 Hours:   Recent Lab Results       06/26/19  1551        Chlamydia, Amplified DNA Not Detected     N gonorrhoeae, amplified DNA Not Detected           Significant Imaging: None  "

## 2019-06-27 NOTE — ASSESSMENT & PLAN NOTE
Assessment: Shahid Lackey is a 32 y/o Hx of IVDU admitted for intracranial hemorrhage. He was PEC/CEC at outside facilty due to agressive and agitation. Psych was consulted to evaluate continued need for PEC/CEC. Patient's orientation appears to wax and wane- Currently only oriented x 4.  He is no longer endorsing paranoia or delusional thought content.  Pt denies SI/HI/AVH.    Recommendations:  1. Discontinue CEC, pt is no longer a threat to himself, others, nor is he gravely disabled.  2. DELIRIUM BEHAVIOR MANAGEMENT  · PLEASE utilize CHEMICAL restraints with PRN meds first for agitation. Minimize use of PHYSICAL restraints OR have periods of being out of physical restraints if possible.  · Keep window shades open and room lit during day and room dim at night in order to promote normal sleep-wake cycles  · Encourage family at bedside. [place] patient often to situation, location, date.  · Continue to Limit or Discontinue use of Narcotics, Benzos and Anti-cholinergic medications as they may worsen delirium.  · Continue medical workup for causative etiology of Delirium.  3. Elevated White count- order HIV, STD work up  4. PRN Medications: Continue Zyprexa 5 mg po/im prn non-redirectable agitation associated with breakthrough psychosis or florin if needed to help the pt more effectively interact with environment.   5.  Psychiatry will sign off at this point.  6. Case Discussed with: Dr. Zayda SMITH

## 2019-06-27 NOTE — PLAN OF CARE
Problem: Occupational Therapy Goal  Goal: Occupational Therapy Goal  Goals set 6/21 to be addressed for 14 days with expiration date, 7/5:  Patient will increase functional independence with ADLs by performing:    Patient will demonstrate rolling to the right with min assist.  Not met   Patient will demonstrate rolling to the left with min assist.   Not met  Patient will demonstrate supine -sit with mod assist.   Not met  Patient will demonstrate stand pivot transfers with mod assist.   Not met  Patient will demonstrate grooming while seated with min assist.   Not met  Patient will demonstrate upper body dressing with mod assist while seated EOB.   Not met  Patient will demonstrate lower body dressing with mod assist while seated EOB.   Not met  Patient will demonstrate toileting with mod assist.   Not met  Patient will demonstrate bathing while seated EOB with mod assist.   Not met  Patient's family / caregiver will demonstrate independence and safety with assisting patient with self-care skills and functional mobility.     Not met  Patient's family / caregiver will demonstrate independence with providing ROM and changes in bed positioning.   Not met       Goals remain appropriate.  LISA Barrios  6/27/2019

## 2019-06-28 PROBLEM — M21.372 FOOT DROP, LEFT: Status: ACTIVE | Noted: 2019-01-01

## 2019-06-28 NOTE — PLAN OF CARE
06/28/19 1556   Final Note   Assessment Type Final Discharge Note   Anticipated Discharge Disposition Rehab  ([facility])   Right Care Referral Info   Post Acute Recommendation IRF

## 2019-06-28 NOTE — PLAN OF CARE
06/28/19 1609   Final Note   Assessment Type Final Discharge Note   Anticipated Discharge Disposition Rehab   Hospital Follow Up  Appt(s) scheduled? Yes   Discharge plans and expectations educations in teach back method with documentation complete? Yes   Right Care Referral Info   Post Acute Recommendation IRF   Referral Type IRF   [place] [address]

## 2019-06-28 NOTE — PROGRESS NOTES
[facility]    FACILITY TRANSFER ORDERS      Patient Name: Shahid Lackey  YOB: 1985    PCP: Artem Rcie MD   PCP Address: [address] / [place] [address]  PCP Phone Number: [phone]  PCP Fax: [phone]    Encounter Date: 06/28/2019    Admit to: [facility] inpatient rehab    Vital Signs:  Routine    Diagnoses:   Active Hospital Problems    Diagnosis  POA    *Nontraumatic cortical hemorrhage of cerebral hemisphere [I61.1]  Yes    Vasogenic cerebral edema [G93.6]  Yes    Current smoker [F17.200]  Yes    Brain compression [G93.5]  Yes    Hypokalemia [E87.6]  Yes    Metabolic alkalosis [E87.3]  Yes    Sepsis [A41.9]  Yes    ICH (intracerebral hemorrhage) [I61.9]  Yes    Left hemiparesis [G81.94]  Yes    Transaminitis [R74.0]  Yes    Rhabdomyolysis [M62.82]  Yes    Acute delirium [R41.0]  Yes    Leukocytosis [D72.829]  Yes    Drug abuse, IV [F19.10]  Yes      Resolved Hospital Problems   No resolved problems to display.       Allergies:Review of patient's allergies indicates:  No Known Allergies    Diet: regular diet    Activities: Activity as tolerated    Nursing: Delirium precautions per facility protocol      Labs: labs per facility protocol      CONSULTS:    Physical Therapy to evaluate and treat. , Occupational Therapy to evaluate and treat., Speech Therapy to evaluate and treat for Language, Swallowing and Cognition. and  to evaluate for community resources/long-range planning.    MISCELLANEOUS CARE:  Apply AFO brace to L foot for foot drop    WOUND CARE ORDERS  None    Medications: Review discharge medications with patient and family and provide education.      Current Discharge Medication List      START taking these medications    Details   acetaminophen (TYLENOL) 325 MG tablet Take 2 tablets (650 mg total) by mouth every 6 (six) hours as needed.  Refills: 0      amLODIPine (NORVASC) 5 MG tablet Take 1 tablet (5 mg total) by mouth  once daily.  Qty: 30 tablet, Refills: 1      folic acid (FOLVITE) 1 MG tablet Take 1 tablet (1 mg total) by mouth once daily.  Refills: 0      magnesium oxide (MAG-OX) 400 mg (241.3 mg magnesium) tablet Take 1 tablet (400 mg total) by mouth once daily.  Refills: 0      OLANZapine (ZYPREXA) 5 MG tablet Take 1 tablet (5 mg total) by mouth every 6 (six) hours as needed (agitation).  Qty: 120 tablet, Refills: 0      senna-docusate 8.6-50 mg (PERICOLACE) 8.6-50 mg per tablet Take 1 tablet by mouth 2 (two) times daily.      thiamine 100 MG tablet Take 1 tablet (100 mg total) by mouth once daily.         CONTINUE these medications which have NOT CHANGED    Details   buprenorphine-naloxone (SUBOXONE) 8-2 mg Film Place 2 each under the tongue once daily.      FLUoxetine 40 MG capsule Take 40 mg by mouth once daily.         STOP taking these medications       clonazePAM (KLONOPIN) 0.5 MG tablet Comments:   Reason for Stopping:         gabapentin (NEURONTIN) 300 MG capsule Comments:   Reason for Stopping:                    _________________________________  Gerald Weber NP  06/28/2019

## 2019-06-28 NOTE — PT/OT/SLP DISCHARGE
Occupational Therapy Discharge Summary    Shahid Lackey  MRN: [id number]   Principal Problem: Nontraumatic cortical hemorrhage of cerebral hemisphere      Patient Discharged from acute Occupational Therapy on 6/28  Please refer to prior OT note dated 6/28 for functional status.    Assessment:      Patient appropriate for care in another setting.    Objective:     GOALS:   Multidisciplinary Problems     Occupational Therapy Goals        Problem: Occupational Therapy Goal    Goal Priority Disciplines Outcome Interventions   Occupational Therapy Goal     OT, PT/OT     Description:  Goals set 6/27 to be addressed for 14 days with expiration date, 7/11:  Patient will increase functional independence with ADLs by performing:    Patient will demonstrate rolling to the right with min assist.  Not met   Patient will demonstrate rolling to the left with min assist.   Not met  Patient will demonstrate supine -sit with mod assist.   Not met  Patient will demonstrate stand pivot transfers with mod assist.   Not met  Patient will demonstrate grooming while seated with min assist.   Not met  Patient will demonstrate upper body dressing with mod assist while seated EOB.   Not met  Patient will demonstrate lower body dressing with mod assist while seated EOB.   Not met  Patient will demonstrate toileting with mod assist.   Not met  Patient will demonstrate bathing while seated EOB with mod assist.   Not met  Patient's family / caregiver will demonstrate independence and safety with assisting patient with self-care skills and functional mobility.     Not met  Patient's family / caregiver will demonstrate independence with providing ROM and changes in bed positioning.   Not met                         Reasons for Discontinuation of Therapy Services  Transfer to alternate level of care.      Plan:     Patient Discharged to: Inpatient Rehab    LISA Barrios  6/28/2019

## 2019-06-28 NOTE — PLAN OF CARE
Problem: SLP Goal  Goal: SLP Goal  SLP goals to be met by 7/4  1.Pt will tolerate diet of dental soft solids/ thin liquids with no overt signs of aspiration-met  2. Pt will complete simple L scanning tasks with 80% accuracy with max A.  3. Pt will complete orientation tasks with 90% accuracy and min cues to increase safety awareness  4. Pt will complete problem solving/reasoning tasks with 80% accuracy and min cues to increase safety awareness-met   5. Pt will participate in ongoing assessment of reading and writing   6. Pt will participate in ongoing assessment of cognitive-linguistic skills     Updated:  1. Pt will tolerate diet of regular solids/thin liquids with no overt signs of aspiration.   2. Pt will complete problem solving/reasoning tasks with 90% accuracy and min cues to increase safety awareness.     Outcome: Ongoing (interventions implemented as appropriate)  Goals remain appropriate, cont POC. MART Trujillo, CCC/SLP  6/28/2019

## 2019-06-28 NOTE — SUBJECTIVE & OBJECTIVE
Neurologic Chief Complaint: Right ICH    Subjective:     Interval History: Patient is seen for follow-up neurological assessment and treatment recommendations:    NICOLE. Pending placement to R rehab pending insurance auth. Magnesium daily for continued headaches.     HPI, Past Medical, Family, and Social History remains the same as documented in the initial encounter.     Review of Systems   Constitutional: Negative for fever.   Eyes: Positive for visual disturbance.   Respiratory: Negative for shortness of breath.    Cardiovascular: Negative for chest pain.   Gastrointestinal: Negative for vomiting.   Neurological: Positive for facial asymmetry, speech difficulty, weakness and headaches.   Psychiatric/Behavioral: Negative for agitation and behavioral problems.     Scheduled Meds:   amLODIPine  5 mg Oral Daily    folic acid  1 mg Oral Daily    heparin (porcine)  5,000 Units Subcutaneous Q8H    magnesium oxide  400 mg Oral Daily    senna-docusate 8.6-50 mg  1 tablet Oral BID    thiamine  100 mg Oral Daily     Continuous Infusions:    PRN Meds:acetaminophen, OLANZapine, OLANZapine, ondansetron, sodium chloride 0.9%, sodium chloride 0.9%    Objective:     Vital Signs (Most Recent):  Temp: 97.7 °F (36.5 °C) (06/28/19 1116)  Pulse: 80 (06/28/19 1118)  Resp: 18 (06/28/19 1116)  BP: 108/71 (06/28/19 1116)  SpO2: 95 % (06/28/19 1116)  BP Location: Left arm    Vital Signs Range (Last 24H):  Temp:  [97.7 °F (36.5 °C)-99.2 °F (37.3 °C)]   Pulse:  []   Resp:  [16-18]   BP: (102-123)/(57-71)   SpO2:  [94 %-99 %]   BP Location: Left arm    Physical Exam   Constitutional: He appears well-developed. No distress.   Underweight   HENT:   Head: Normocephalic and atraumatic.   Cardiovascular: Normal rate.   Pulmonary/Chest: Effort normal. No respiratory distress.   Neurological: He is alert.   Skin: Skin is warm.   Psychiatric: He is not agitated and not aggressive.   In good spirits today, motivated to work with therapy     Nursing note and vitals reviewed.      Neurological Exam:   LOC: alert  Attention Span: good  Language: No aphasia  Articulation: Dysarthria  Orientation: oriented to person, place, time  Visual Fields: L hemianopsia  EOM (CN III, IV, VI): Gaze preference  Right able to cross midline  Pupils (CN II, III): PERRL  Facial Sensation (CN V): Normal  Facial Movement (CN VII): Lower facial weakness on the Left  Motor: LUE 3/5, RUE 5/5, RLE 5/5; LLE 3/5  Cebellar: No evidence of appendicular or axial ataxia  Sensation: intact bilaterally      Laboratory:  CMP:   Recent Labs   Lab 06/28/19  0506   CALCIUM 9.5   ALBUMIN 3.7   PROT 7.1      K 3.8   CO2 24      BUN 16   CREATININE 0.8   ALKPHOS 73   ALT 37   AST 27   BILITOT 0.6     BMP:   Recent Labs   Lab 06/28/19  0506      K 3.8      CO2 24   BUN 16   CREATININE 0.8   CALCIUM 9.5     CBC:   Recent Labs   Lab 06/28/19  0506   WBC 11.06   RBC 5.17   HGB 15.7   HCT 46.8   *   MCV 91   MCH 30.4   MCHC 33.5     Lipid Panel:   No results for input(s): CHOL, LDLCALC, HDL, TRIG in the last 168 hours.  Coagulation:   Recent Labs   Lab 06/25/19  1507   INR 1.1     Hgb A1C:   No results for input(s): HGBA1C in the last 168 hours.  TSH:   No results for input(s): TSH in the last 168 hours.    Diagnostic Results     Brain imaging:  MRI 6/20/2019  Large parenchymal hemorrhage centered within the bilateral parietal lobes extending across midline through the splenium of the corpus callosum corresponding to abnormality seen on CT.  There is thin subdural hemorrhage overlying the right cerebral hemisphere with questionable trace hemorrhage also overlying the left inferior frontal lobe.  Mass effect with approximate 7 mm of leftward midline shift with distortion of the ventricles similar to prior.  Trace prominence of the temporal horns lateral ventricles cannot exclude component of trapped ventricles and early hydrocephalus with remaining ventricular system  relatively the normal in caliber.    CT head w/o contrast 6-19-19 results:  1. The study is significantly abnormal.  There is acute intracranial hemorrhage with large parenchymal hemorrhage in the posteromedial right occipital parietal lobe crossing over the splenium of the corpus callosum into the posteromedial left occipital parietal lobe.  There is moderate-to-marked associated edema.  There is approximately 6 mm right to left midline shift.  Subdural blood is also seen along the falx and extending inferiorly along the right tentorium.  The cerebellar tonsils are normal position.  There is compression and depression of the lateral ventricles with moderate to marked dilatation of the temporal horns.     Vessel Imaging:    Angiogram 6/21/2019  Preliminary interpretation: No evidence of aneurysm or AVM. Diffuse irregularity of multiple small and medium sized arteries with multiple areas of narrowing/beading raising question of CNS vasculitis or RCVS,. Please see Imaging report for full details.    MRA/MRV 6/20/2019  MRA head: Unremarkable motion limited MRA of the head specifically without definite abnormal flow related enhancement associated with the region of parenchymal hemorrhage partially included in the study to suggest definite associated vascular malformation.  Correlation and follow up repeat imaging when patient better able to tolerate scanning advised.  Thin subdural hemorrhage overlies the right cerebral hemisphere.  MRV: Motion limited examination as detailed above with dominant right transverse and sigmoid sinuses.  The left sigmoid sinus is not seen and likely obscured by motion.    CTA Head and neck 6-19-19 results:    Acute intraparenchymal hematoma centered within right and left parietal lobes with extension across the corpus callosum.  Volume of hemorrhage is stable to slightly progressed in comparison to prior exam.  Slightly progressed edema and mass effect resulting in 8 cm leftward midline  shift.  No new hemorrhage.    Stable dilatation of the temporal horns of lateral ventricles consistent mild hydrocephalus.    Small volume subdural hemorrhage layering along the falx and tentorium, unchanged    No evidence of large intracranial aneurysm or AVM.  Please note evaluation is limited secondary to extensive hemorrhage and mass effect.  Consider cerebral angiography for further evaluation, as clinically indicated.     Cardiac Evaluation:   TTE 6/20/19  · Normal left ventricular systolic function. The estimated ejection fraction is 65%  · Normal right ventricular systolic function.  · Normal LV diastolic function.  · No wall motion abnormalities.  · Normal central venous pressure (3 mm Hg).  · Normal size left atrium     EKG 6-19-19 results:  Normal sinus rhythm  Biatrial enlargement  Prolonged QT  Abnormal ECG  When compared with ECG of 16-JUN-2019 12:31,  Vent. rate has increased BY 34 BPM  Non-specific change in ST segment in Inferior leads  QT has lengthened

## 2019-06-28 NOTE — DISCHARGE SUMMARY
[facility]  Vascular Neurology  Comprehensive Stroke Center  Discharge Summary     Summary:     Admit Date: 6/19/2019  5:47 PM    Discharge Date and Time: 6/28/19    Attending Physician: Amalia Henley MD     Discharge Provider: Gerald Weber NP    History of Present Illness: 32 y/o male with long history of IV drug abuse was brought in to [facility] on 6-15-19 with drug overdose and was extremely combative and was PEC/CEC and transferred to [facility] to the ICU for closer monitoring. On 6-19-19 noticed no movement on left side but unsure when last known time really is. CT scan revealed large ICH with IVH R parietal lobe with midline shift. Patient transferred to [facility] for closer monitoring and evalaution. Patiet drug screen +for cocaine, heroin, THC, amphetamines and benzo's. NSGY consulted, no interventions. MRV negative.  Angiogram completed w/ no signs of AVM or aneurysm but concern for vasculitis. 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 PT/OT/SLP recommended rehab.    While in [facility] patient was also treated for rhabdomylosis. Psych followed patient during stay for acute delirium which improved, CEC discontinued 6/27. Follow up with Vascular neurology in 4-6 weeks, Neurosurgery follow up scheduled on 7/18/19. For details in hospital stay please see hospital coarse below.     Hospital Course (synopsis of major diagnoses, care, treatment, and services provided during the course of the hospital stay): Hospital course prolonged for the following reasons:     6/20:  Restless but cooperative. On 2% HTS  6/21: Patient continues on 2% and Cardene gtt. Following simple commands.   6/22: On 2% gtt. Off cardene gtt.   6/23: TCDs daily, results pending. Of note possible step down tomorrow.  6/24: patient off cardene and HTS, plans for step down  6/25: patient CEC'd by  on 6/18, needs rehab placement, no behavior issues or agitation, no longer  requiring restraints, patient interested in drug rehab resources, TCD with mildly elevated velocities left MCA territory on 6/24 6/26: Pt stepped down overnight. Psych consulted yesterday and recommended continuing PEC. Zyprexa ordered PRN for non-redirectable agitation. STD work up as suggested including hep panel. Pleasant on exam this AM - cooperating for most part but continues to say sorry I am just tired.   6/27: NAEON. Patient in pleasant mood this morning, motivated/interactive; per nursing patient easily redirected overnight, PRN zyprexa received, discontinued CEC per psych recs. Psych recommend STD panel; Hx of hep C, + hep C Ab, ordered HCV RNA.  6/28: NAEON. Pending placement to R rehab pending insurance auth.     Stroke Etiology: Evident Other/Uncommon Causes     STROKE DOCUMENTATION         NIH Scale:  1a. Level of Consciousness: 0-->Alert, keenly responsive  1b. LOC Questions: 0-->Answers both questions correctly  1c. LOC Commands: 0-->Performs both tasks correctly  2. Best Gaze: 1-->Partial gaze palsy, gaze is abnormal in one or both eyes, but forced deviation or total gaze paresis is not present  3. Visual: 1-->Partial hemianopia  4. Facial Palsy: 1-->Minor paralysis (flattened nasolabial fold, asymmetry on smiling)  5a. Motor Arm, Left: 2-->Some effort against gravity, limb cannot get to or maintain (if cued) 90 (or 45) degrees, drifts down to bed, but has some effort against gravity  5b. Motor Arm, Right: 0-->No drift, limb holds 90 (or 45) degrees for full 10 secs  6a. Motor Leg, Left: 2-->Some effort against gravity, leg falls to bed by 5 secs, but has some effort against gravity  6b. Motor Leg, Right: 0-->No drift, leg holds 30 degree position for full 5 secs  7. Limb Ataxia: 0-->Absent  8. Sensory: 0-->Normal, no sensory loss  9. Best Language: 0-->No aphasia, normal  10. Dysarthria: 1-->Mild-to-moderate dysarthria, patient slurs at least some words and, at worst, can be understood with  some difficulty  11. Extinction and Inattention (formerly Neglect): 0-->No abnormality  Total (NIH Stroke Scale): 8        Modified Chelsea Score: 4  Catherine Coma Scale:    ABCD2 Score:    [id number] Score:   HAS -BLED Score:   ICH Score:3  Hunt & Dougherty Classification:       Assessment/Plan:     Diagnostic Results:    Brain imaging:  MRI 6/20/2019  Large parenchymal hemorrhage centered within the bilateral parietal lobes extending across midline through the splenium of the corpus callosum corresponding to abnormality seen on CT.  There is thin subdural hemorrhage overlying the right cerebral hemisphere with questionable trace hemorrhage also overlying the left inferior frontal lobe.  Mass effect with approximate 7 mm of leftward midline shift with distortion of the ventricles similar to prior.  Trace prominence of the temporal horns lateral ventricles cannot exclude component of trapped ventricles and early hydrocephalus with remaining ventricular system relatively the normal in caliber.     CT head w/o contrast 6-19-19 results:  1. The study is significantly abnormal.  There is acute intracranial hemorrhage with large parenchymal hemorrhage in the posteromedial right occipital parietal lobe crossing over the splenium of the corpus callosum into the posteromedial left occipital parietal lobe.  There is moderate-to-marked associated edema.  There is approximately 6 mm right to left midline shift.  Subdural blood is also seen along the falx and extending inferiorly along the right tentorium.  The cerebellar tonsils are normal position.  There is compression and depression of the lateral ventricles with moderate to marked dilatation of the temporal horns.     Vessel Imaging:     Angiogram 6/21/2019  Preliminary interpretation: No evidence of aneurysm or AVM. Diffuse irregularity of multiple small and medium sized arteries with multiple areas of narrowing/beading raising question of CNS vasculitis or RCVS,. Please see  Imaging report for full details.     MRA/MRV 6/20/2019  MRA head: Unremarkable motion limited MRA of the head specifically without definite abnormal flow related enhancement associated with the region of parenchymal hemorrhage partially included in the study to suggest definite associated vascular malformation.  Correlation and follow up repeat imaging when patient better able to tolerate scanning advised.  Thin subdural hemorrhage overlies the right cerebral hemisphere.  MRV: Motion limited examination as detailed above with dominant right transverse and sigmoid sinuses.  The left sigmoid sinus is not seen and likely obscured by motion.     CTA Head and neck 6-19-19 results:    Acute intraparenchymal hematoma centered within right and left parietal lobes with extension across the corpus callosum.  Volume of hemorrhage is stable to slightly progressed in comparison to prior exam.  Slightly progressed edema and mass effect resulting in 8 cm leftward midline shift.  No new hemorrhage.    Stable dilatation of the temporal horns of lateral ventricles consistent mild hydrocephalus.    Small volume subdural hemorrhage layering along the falx and tentorium, unchanged    No evidence of large intracranial aneurysm or AVM.  Please note evaluation is limited secondary to extensive hemorrhage and mass effect.  Consider cerebral angiography for further evaluation, as clinically indicated.     Cardiac Evaluation:   TTE 6/20/19  · Normal left ventricular systolic function. The estimated ejection fraction is 65%  · Normal right ventricular systolic function.  · Normal LV diastolic function.  · No wall motion abnormalities.  · Normal central venous pressure (3 mm Hg).  · Normal size left atrium      EKG 6-19-19 results:  Normal sinus rhythm  Biatrial enlargement  Prolonged QT  Abnormal ECG  When compared with ECG of 16-JUN-2019 12:31,  Vent. rate has increased BY 34 BPM  Non-specific change in ST segment in Inferior leads  QT has  lengthened         Interventions: Diagnostic Angiography    Complications: None    Disposition: Rehab Facility    Final Active Diagnoses:    Diagnosis Date Noted POA    PRINCIPAL PROBLEM:  Nontraumatic cortical hemorrhage of cerebral hemisphere [I61.1] 06/19/2019 Yes    Foot drop, left [M21.372] 06/28/2019 No    Vasogenic cerebral edema [G93.6] 06/20/2019 Yes    Current smoker [F17.200] 06/20/2019 Yes    Brain compression [G93.5] 06/20/2019 Yes    Hypokalemia [E87.6] 06/20/2019 Yes    Metabolic alkalosis [E87.3] 06/20/2019 Yes    Sepsis [A41.9] 06/20/2019 Yes    ICH (intracerebral hemorrhage) [I61.9] 06/19/2019 Yes    Left hemiparesis [G81.94] 06/19/2019 Yes    Transaminitis [R74.0] 06/19/2019 Yes    Rhabdomyolysis [M62.82] 06/17/2019 Yes    Acute delirium [R41.0] 06/16/2019 Yes    Leukocytosis [D72.829] 07/18/2018 Yes    Drug abuse, IV [F19.10] 07/18/2018 Yes      Problems Resolved During this Admission:     * Nontraumatic cortical hemorrhage of cerebral hemisphere  32 y/o IV drug abuser with large R ICH with IVH.  MRV negative.  Angiogram completed w/ no signs of AVM or aneurysm but concern for vasculitis. 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       Antithrombotics: None ICH    Statins: None ICH    Aggressive risk factor modification: drug use, smoker     Rehab efforts: The patient has been evaluated by a stroke team provider and the therapy needs have been fully considered based off the presenting complaints and exam findings. The following therapy evaluations are needed: PT evaluate and treat, OT evaluate and treat, SLP evaluate and treat, PM&R evaluate for appropriate placement - recommending rehab    Diagnostics ordered/pending: CT for any changes in neurological status    VTE prophylaxis: SCDs only large ICH    BP parameters: ICH: SBP <160        Foot drop, left  AFO brace ordered    Brain compression  HTS discontinued in [facility] 6/22      Current smoker  Stroke risk  factor  Counseled on smoking cessation  Patient willing to quit  Will need resources for smoking cessation at discharge    Vasogenic cerebral edema  Area of vasogenic cerebral edema identified when reviewing brain imaging in the territory of the R middle cerebral artery. There is mass effect associated with it. We will continue to monitor the patients clinical exam for any worsening of symptoms which may indicate expansion of the stroke or the area of the edema resulting in the clinical change. The pattern is suggestive of drug induced vasculopathy etiology         Transaminitis  History of hep c  Hep panel ordered - + Hep C Ab, HCV RNA pending.   Follow up with PCP    Left hemiparesis  Due to ICH  Aggressive therapy     Rhabdomyolysis  CPK trending down, improved    Acute delirium  Psych signed off 6/27  Improved - Discontinued CEC 6/27  Zyprexa ordered for non redirectable agitation      Leukocytosis  WBC 12.74   Off abx  No obvious source of infection at this time  procal WNL  RPR and STD labs ordered per psych recs - hx of Hep C,+ Hep C Ab, HCV RNA pending workup otherwise negative     Drug abuse, IV  Stroke risk factor  Counseled on cessation and patient requesting resources for drug rehab  Provide resources when appropriate         Recommendations:     Post-discharge complication risks: Falls, Pneumonia, Skin breakdown, Drug use    Stroke Education given to: patient    Follow-up in Stroke Clinic in 4-6 weeks.   Follow-up in Neurosurgery Clinic July 18, 2019    Discharge Plan:  Smoking Cessation  Aggresive risk factor modification:  Smoking  Drug use cessation     Follow Up:  Follow-up Information     Artem Rice MD.    Specialty:  Internal Medicine  Why:  Outpatient Services. Please see PCP within 10 days of discharge.   Contact information:  [address]  [place] [address] [phone]             [facility] VASCULAR NEUROLOGY.    Specialty:  Vascular Neurology  Why:  Someone will call  "to schedule an appointment for follow up within 4-6 weeks. If you do not hear from anyone within 1 week call number below.   Contact information:  Kathy Davila  [facility] [address] [phone]           Nura Davila - Neurosurgery [facility] On 7/18/2019.    Specialty:  Neurosurgery  Why:  2:40 pm  Contact information:  Kathy Davila  [facility] [address] [phone]  Additional information:  3rd [facility], [facility]                 Patient Instructions:      ANKLE FOOT ORTHOSIS FOR HOME USE     Order Specific Question Answer Comments   Type of AFO to be filled? Other (please specify) Left foot drop    Height: 5' 10" (1.778 m)    Weight: 59.4 kg (131 lb)      Activity as tolerated       Medications:  Reconciled Home Medications:      Medication List      START taking these medications    acetaminophen 325 MG tablet  Commonly known as:  TYLENOL  Take 2 tablets (650 mg total) by mouth every 6 (six) hours as needed.     amLODIPine 5 MG tablet  Commonly known as:  NORVASC  Take 1 tablet (5 mg total) by mouth once daily.  Start taking on:  6/29/2019     folic acid 1 MG tablet  Commonly known as:  FOLVITE  Take 1 tablet (1 mg total) by mouth once daily.  Start taking on:  6/29/2019     magnesium oxide 400 mg (241.3 mg magnesium) tablet  Commonly known as:  MAG-OX  Take 1 tablet (400 mg total) by mouth once daily.  Start taking on:  6/29/2019     OLANZapine 5 MG tablet  Commonly known as:  ZyPREXA  Take 1 tablet (5 mg total) by mouth every 6 (six) hours as needed (agitation).     senna-docusate 8.6-50 mg 8.6-50 mg per tablet  Commonly known as:  PERICOLACE  Take 1 tablet by mouth 2 (two) times daily.     thiamine 100 MG tablet  Take 1 tablet (100 mg total) by mouth once daily.  Start taking on:  6/29/2019        CONTINUE taking these medications    buprenorphine-naloxone 8-2 mg Film  Commonly known as:  SUBOXONE  Place 2 each under the tongue once daily.     FLUoxetine 40 MG capsule  Take " 40 mg by mouth once daily.        STOP taking these medications    clonazePAM 0.5 MG tablet  Commonly known as:  KLONOPIN     gabapentin 300 MG capsule  Commonly known as:  NEURONTIN            Gerald Weber NP  [facility]  Department of Vascular Neurology   [facility]

## 2019-06-28 NOTE — NURSING
EMS arrived with wheel chair, patient unable to transfer to wheel chair. Needs stretcher. Unable to reach  at the moment

## 2019-06-28 NOTE — ASSESSMENT & PLAN NOTE
WBC 12.74   Off abx  No obvious source of infection at this time  procal WNL  RPR and STD labs ordered per psych recs - hx of Hep C,+ Hep C Ab, HCV RNA pending workup otherwise negative

## 2019-06-28 NOTE — PROGRESS NOTES
[facility]  Vascular Neurology  Comprehensive Stroke Center  Progress Note    Assessment/Plan:     * Nontraumatic cortical hemorrhage of cerebral hemisphere  32 y/o IV drug abuser with large R ICH with IVH.  MRV negative.  Angiogram completed w/ no signs of AVM or aneurysm but concern for vasculitis. 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       Antithrombotics: None ICH    Statins: None ICH    Aggressive risk factor modification: drug use, smoker     Rehab efforts: The patient has been evaluated by a stroke team provider and the therapy needs have been fully considered based off the presenting complaints and exam findings. The following therapy evaluations are needed: PT evaluate and treat, OT evaluate and treat, SLP evaluate and treat, PM&R evaluate for appropriate placement - recommending rehab    Diagnostics ordered/pending: CT for any changes in neurological status    VTE prophylaxis: SCDs only large ICH    BP parameters: ICH: SBP <160        Brain compression  HTS discontinued in NCC 6/22      Current smoker  Stroke risk factor  Counseled on smoking cessation  Patient willing to quit  Will need resources for smoking cessation at discharge    Vasogenic cerebral edema  Area of vasogenic cerebral edema identified when reviewing brain imaging in the territory of the R middle cerebral artery. There is mass effect associated with it. We will continue to monitor the patients clinical exam for any worsening of symptoms which may indicate expansion of the stroke or the area of the edema resulting in the clinical change. The pattern is suggestive of drug induced vasculopathy etiology         Transaminitis  History of hep c  Hep panel ordered - + Hep C Ab, HCV RNA pending.     Left hemiparesis  Due to ICH  Aggressive therapy     Rhabdomyolysis  CPK trending down    Acute delirium  Psych signed off 6/27  Improved - Discontinued CEC 6/27  Zyprexa ordered for non redirectable  agitation      Leukocytosis  WBC 12.74   Off abx  No obvious source of infection at this time  procal WNL  RPR and STD labs ordered per psych recs - hx of Hep C,+ Hep C Ab, HCV RNA pending workup otherwise negative     Drug abuse, IV  Stroke risk factor  Counseled on cessation and patient requesting resources for drug rehab  Provide resources when appropriate          6/20:  Restless but cooperative. On 2% HTS  6/21: Patient continues on 2% and Cardene gtt. Following simple commands.   6/22: On 2% gtt. Off cardene gtt.   6/23: TCDs daily, results pending. Of note possible step down tomorrow.  6/24: patient off cardene and HTS, plans for step down  6/25: patient CEC'd by  on 6/18, needs rehab placement, no behavior issues or agitation, no longer requiring restraints, patient interested in drug rehab resources, TCD with mildly elevated velocities left MCA territory on 6/24 6/26: Pt stepped down overnight. Psych consulted yesterday and recommended continuing PEC. Zyprexa ordered PRN for non-redirectable agitation. STD work up as suggested including hep panel. Pleasant on exam this AM - cooperating for most part but continues to say sorry I am just tired.   6/27: NAEON. Patient in pleasant mood this morning, motivated/interactive; per nursing patient easily redirected overnight, PRN zyprexa received, discontinued CEC per psych recs. Psych recommend STD panel; Hx of hep C, + hep C Ab, ordered HCV RNA.  6/28: NAEON. Pending placement to R rehab pending insurance auth.     STROKE DOCUMENTATION        NIH Scale:  1a. Level of Consciousness: 0-->Alert, keenly responsive  1b. LOC Questions: 0-->Answers both questions correctly  1c. LOC Commands: 0-->Performs both tasks correctly  2. Best Gaze: 1-->Partial gaze palsy, gaze is abnormal in one or both eyes, but forced deviation or total gaze paresis is not present  3. Visual: 1-->Partial hemianopia  4. Facial Palsy: 1-->Minor paralysis (flattened nasolabial fold,  asymmetry on smiling)  5a. Motor Arm, Left: 2-->Some effort against gravity, limb cannot get to or maintain (if cued) 90 (or 45) degrees, drifts down to bed, but has some effort against gravity  5b. Motor Arm, Right: 0-->No drift, limb holds 90 (or 45) degrees for full 10 secs  6a. Motor Leg, Left: 2-->Some effort against gravity, leg falls to bed by 5 secs, but has some effort against gravity  6b. Motor Leg, Right: 0-->No drift, leg holds 30 degree position for full 5 secs  7. Limb Ataxia: 0-->Absent  8. Sensory: 0-->Normal, no sensory loss  9. Best Language: 0-->No aphasia, normal  10. Dysarthria: 1-->Mild-to-moderate dysarthria, patient slurs at least some words and, at worst, can be understood with some difficulty  11. Extinction and Inattention (formerly Neglect): 0-->No abnormality  Total (NIH Stroke Scale): 8       Modified [place] Score: 0  [place] Coma Scale:    ABCD2 Score:    [id number] Score:   HAS -BLED Score:   ICH Score:3  Hunt & Dougherty Classification:      Hemorrhagic change of an Ischemic Stroke: Does this patient have an ischemic stroke with hemorrhagic changes? No     Neurologic Chief Complaint: Right ICH    Subjective:     Interval History: Patient is seen for follow-up neurological assessment and treatment recommendations:    T[place]. Pending placement to [facility] rehab pending insurance auth. Magnesium daily for continued headaches.     HPI, Past Medical, Family, and Social History remains the same as documented in the initial encounter.     Review of Systems   Constitutional: Negative for fever.   Eyes: Positive for visual disturbance.   Respiratory: Negative for shortness of breath.    Cardiovascular: Negative for chest pain.   Gastrointestinal: Negative for vomiting.   Neurological: Positive for facial asymmetry, speech difficulty, weakness and headaches.   Psychiatric/Behavioral: Negative for agitation and behavioral problems.     Scheduled Meds:   amLODIPine  5 mg Oral Daily    folic acid  1 mg  Oral Daily    heparin (porcine)  5,000 Units Subcutaneous Q8H    magnesium oxide  400 mg Oral Daily    senna-docusate 8.6-50 mg  1 tablet Oral BID    thiamine  100 mg Oral Daily     Continuous Infusions:    PRN Meds:acetaminophen, OLANZapine, OLANZapine, ondansetron, sodium chloride 0.9%, sodium chloride 0.9%    Objective:     Vital Signs (Most Recent):  Temp: 97.7 °F (36.5 °C) (06/28/19 1116)  Pulse: 80 (06/28/19 1118)  Resp: 18 (06/28/19 1116)  BP: 108/71 (06/28/19 1116)  SpO2: 95 % (06/28/19 1116)  BP Location: Left arm    Vital Signs Range (Last 24H):  Temp:  [97.7 °F (36.5 °C)-99.2 °F (37.3 °C)]   Pulse:  []   Resp:  [16-18]   BP: (102-123)/(57-71)   SpO2:  [94 %-99 %]   BP Location: Left arm    Physical Exam   Constitutional: He appears well-developed. No distress.   Underweight   HENT:   Head: Normocephalic and atraumatic.   Cardiovascular: Normal rate.   Pulmonary/Chest: Effort normal. No respiratory distress.   Neurological: He is alert.   Skin: Skin is warm.   Psychiatric: He is not agitated and not aggressive.   In good spirits today, motivated to work with therapy    Nursing note and vitals reviewed.      Neurological Exam:   LOC: alert  Attention Span: good  Language: No aphasia  Articulation: Dysarthria  Orientation: oriented to person, place, time  Visual Fields: L hemianopsia  EOM (CN III, IV, VI): Gaze preference  Right able to cross midline  Pupils (CN II, III): PERRL  Facial Sensation (CN V): Normal  Facial Movement (CN VII): Lower facial weakness on the Left  Motor: LUE 3/5, RUE 5/5, RLE 5/5; LLE 3/5  Cebellar: No evidence of appendicular or axial ataxia  Sensation: intact bilaterally      Laboratory:  CMP:   Recent Labs   Lab 06/28/19  0506   CALCIUM 9.5   ALBUMIN 3.7   PROT 7.1      K 3.8   CO2 24      BUN 16   CREATININE 0.8   ALKPHOS 73   ALT 37   AST 27   BILITOT 0.6     BMP:   Recent Labs   Lab 06/28/19  0506      K 3.8      CO2 24   BUN 16   CREATININE 0.8    CALCIUM 9.5     CBC:   Recent Labs   Lab 06/28/19  0506   WBC 11.06   RBC 5.17   HGB 15.7   HCT 46.8   *   MCV 91   MCH 30.4   MCHC 33.5     Lipid Panel:   No results for input(s): CHOL, LDLCALC, HDL, TRIG in the last 168 hours.  Coagulation:   Recent Labs   Lab 06/25/19  1507   INR 1.1     Hgb A1C:   No results for input(s): HGBA1C in the last 168 hours.  TSH:   No results for input(s): TSH in the last 168 hours.    Diagnostic Results     Brain imaging:  MRI 6/20/2019  Large parenchymal hemorrhage centered within the bilateral parietal lobes extending across midline through the splenium of the corpus callosum corresponding to abnormality seen on CT.  There is thin subdural hemorrhage overlying the right cerebral hemisphere with questionable trace hemorrhage also overlying the left inferior frontal lobe.  Mass effect with approximate 7 mm of leftward midline shift with distortion of the ventricles similar to prior.  Trace prominence of the temporal horns lateral ventricles cannot exclude component of trapped ventricles and early hydrocephalus with remaining ventricular system relatively the normal in caliber.    CT head w/o contrast 6-19-19 results:  1. The study is significantly abnormal.  There is acute intracranial hemorrhage with large parenchymal hemorrhage in the posteromedial right occipital parietal lobe crossing over the splenium of the corpus callosum into the posteromedial left occipital parietal lobe.  There is moderate-to-marked associated edema.  There is approximately 6 mm right to left midline shift.  Subdural blood is also seen along the falx and extending inferiorly along the right tentorium.  The cerebellar tonsils are normal position.  There is compression and depression of the lateral ventricles with moderate to marked dilatation of the temporal horns.     Vessel Imaging:    Angiogram 6/21/2019  Preliminary interpretation: No evidence of aneurysm or AVM. Diffuse irregularity of multiple  small and medium sized arteries with multiple areas of narrowing/beading raising question of CNS vasculitis or RCVS,. Please see Imaging report for full details.    MRA/MRV 6/20/2019  MRA head: Unremarkable motion limited MRA of the head specifically without definite abnormal flow related enhancement associated with the region of parenchymal hemorrhage partially included in the study to suggest definite associated vascular malformation.  Correlation and follow up repeat imaging when patient better able to tolerate scanning advised.  Thin subdural hemorrhage overlies the right cerebral hemisphere.  MRV: Motion limited examination as detailed above with dominant right transverse and sigmoid sinuses.  The left sigmoid sinus is not seen and likely obscured by motion.    CTA Head and neck 6-19-19 results:    Acute intraparenchymal hematoma centered within right and left parietal lobes with extension across the corpus callosum.  Volume of hemorrhage is stable to slightly progressed in comparison to prior exam.  Slightly progressed edema and mass effect resulting in 8 cm leftward midline shift.  No new hemorrhage.    Stable dilatation of the temporal horns of lateral ventricles consistent mild hydrocephalus.    Small volume subdural hemorrhage layering along the falx and tentorium, unchanged    No evidence of large intracranial aneurysm or AVM.  Please note evaluation is limited secondary to extensive hemorrhage and mass effect.  Consider cerebral angiography for further evaluation, as clinically indicated.     Cardiac Evaluation:   TTE 6/20/19  · Normal left ventricular systolic function. The estimated ejection fraction is 65%  · Normal right ventricular systolic function.  · Normal LV diastolic function.  · No wall motion abnormalities.  · Normal central venous pressure (3 mm Hg).  · Normal size left atrium     EKG 6-19-19 results:  Normal sinus rhythm  Biatrial enlargement  Prolonged QT  Abnormal ECG  When compared with  ECG of 16-JUN-2019 12:31,  Vent. rate has increased BY 34 BPM  Non-specific change in ST segment in Inferior leads  QT has lengthened      Gerald Weber NP  [facility]  Department of Vascular Neurology   [facility]

## 2019-06-28 NOTE — PLAN OF CARE
Problem: Occupational Therapy Goal  Goal: Occupational Therapy Goal  Goals set 6/27 to be addressed for 14 days with expiration date, 7/11:  Patient will increase functional independence with ADLs by performing:    Patient will demonstrate rolling to the right with min assist.  Not met   Patient will demonstrate rolling to the left with min assist.   Not met  Patient will demonstrate supine -sit with mod assist.   Not met  Patient will demonstrate stand pivot transfers with mod assist.   Not met  Patient will demonstrate grooming while seated with min assist.   Not met  Patient will demonstrate upper body dressing with mod assist while seated EOB.   Not met  Patient will demonstrate lower body dressing with mod assist while seated EOB.   Not met  Patient will demonstrate toileting with mod assist.   Not met  Patient will demonstrate bathing while seated EOB with mod assist.   Not met  Patient's family / caregiver will demonstrate independence and safety with assisting patient with self-care skills and functional mobility.     Not met  Patient's family / caregiver will demonstrate independence with providing ROM and changes in bed positioning.   Not met        Goals remain appropriate.  LISA Barrios  6/28/2019

## 2019-06-28 NOTE — NURSING
Attempted to call report to CN at rehab facility. Stated she would call me back once fully approved.

## 2019-06-28 NOTE — NURSING
Pt d/c to rehab MD order. Telemetry removed. Telemetry room notified. IV access removed and intact x1. VSS. No distress noted. No complaints noted at this time. Pt given and explained medication list and prescriptions. Pt verbalizes complete understanding of all discharge instructions and follow up care. Pt given printed AVS. AVS confirmation completed.   Awaiting escort. Will continue to monitor.

## 2019-06-28 NOTE — PT/OT/SLP PROGRESS
"Occupational Therapy   Treatment    Name: Shahid Lackey  MRN: [id number]  Admitting Diagnosis:  Nontraumatic cortical hemorrhage of cerebral hemisphere       Recommendations:     Discharge Recommendations: rehabilitation facility  Discharge Equipment Recommendations:  tub bench, wheelchair, manual  Barriers to discharge:  Inaccessible home environment, Decreased caregiver support    Assessment:     Shahid Lackey is a 33 y.o. male with a medical diagnosis of Nontraumatic cortical hemorrhage of cerebral hemisphere.  He presents with performance deficits affecting function are weakness, impaired endurance, impaired sensation, gait instability, impaired cognition, decreased lower extremity function, decreased upper extremity function, impaired functional mobilty, impaired self care skills, impaired balance, decreased coordination, decreased safety awareness, decreased ROM, impaired coordination, abnormal tone, impaired fine motor.     Rehab Prognosis:  Good; patient would benefit from acute skilled OT services to address these deficits and reach maximum level of function.       Plan:     Patient to be seen 4 x/week to address the above listed problems via self-care/home management, neuromuscular re-education, cognitive retraining, therapeutic activities, therapeutic exercises, sensory integration  · Plan of Care Expires: 07/19/19  · Plan of Care Reviewed with: patient    Subjective   Patient:  "I am a little wobbly.  My goal is to be able to sit on my own and wash my face."  PCT:  "He fell out the chair yesterday."  Pain/Comfort:  · Pain Rating 1: 0/10  · Pain Rating Post-Intervention 1: 0/10    Objective:     Communicated with: Nurse prior to session.  Patient found left sidelying with bed alarm, telemetry, peripheral IV, SCD upon OT entry to room.  Family not present.    General Precautions: Standard, aspiration, fall   Orthopedic Precautions:N/A   Braces: N/A     Occupational Performance:     Bed Mobility:  "   · Patient completed Rolling/Turning to Left with  stand by assistance  · Patient completed Rolling/Turning to Right with maximal assistance  · Patient completed Supine to Sit with maximal assistance  · Patient completed Sit to Supine with maximal assistance     Functional Mobility/Transfers:  · Patient completed Sit <> Stand Transfer with maximal assistance  with  no assistive device   · Patient completed Bed <> Chair Transfer using Stand Pivot technique with maximal assistance with no assistive device    Activities of Daily Living:  · Grooming: total assistance while seated EOB  · Upper Body Dressing: total assistance while seated EOB  · Toileting: total assistance while seated EOB with use of urinal      AMPAC 6 Click ADL: 6    Treatment & Education:  PROM performed left UE/LEs one set x 10 rep in all planes of motion with stretches provided at end range; sustained stretch provided for left UE external rotation, supination, shoulder flexion and ankle dorsiflexion; assistance and facilitation provided for upward rotation of the scapula during shoulder flexion and abduction.  Min-Total assist required with postural control while seated EOB with left UE in weight bearing.  Patient initiating most movements with trunk extension.  Addressed left UE weight bearing while seated EOB.  Positioning provided for midline orientation with bilateral UEs elevated and heels lifted off mattress.  Gentle cervical rotation provided to the left.   Family not present during the session.  Patient's functional status and disposition recommendation discussed with stroke team during daily rounds.  White board updated in patient's room.      Patient left supine with all lines intact, call button in reach and bed alarm onEducation:      GOALS:   Multidisciplinary Problems     Occupational Therapy Goals        Problem: Occupational Therapy Goal    Goal Priority Disciplines Outcome Interventions   Occupational Therapy Goal     OT, PT/OT      Description:  Goals set 6/27 to be addressed for 14 days with expiration date, 7/11:  Patient will increase functional independence with ADLs by performing:    Patient will demonstrate rolling to the right with min assist.  Not met   Patient will demonstrate rolling to the left with min assist.   Not met  Patient will demonstrate supine -sit with mod assist.   Not met  Patient will demonstrate stand pivot transfers with mod assist.   Not met  Patient will demonstrate grooming while seated with min assist.   Not met  Patient will demonstrate upper body dressing with mod assist while seated EOB.   Not met  Patient will demonstrate lower body dressing with mod assist while seated EOB.   Not met  Patient will demonstrate toileting with mod assist.   Not met  Patient will demonstrate bathing while seated EOB with mod assist.   Not met  Patient's family / caregiver will demonstrate independence and safety with assisting patient with self-care skills and functional mobility.     Not met  Patient's family / caregiver will demonstrate independence with providing ROM and changes in bed positioning.   Not met                         Time Tracking:     OT Date of Treatment: 06/28/19  OT Start Time: 0718  OT Stop Time: 0742  OT Total Time (min): 24 min    Billable Minutes:Self Care/Home Management 24    LISA Barrios  6/28/2019

## 2019-06-28 NOTE — PLAN OF CARE
06/28/19 1548   Post-Acute Status   Post-Acute Authorization Placement   Post-Acute Placement Status Set-up Complete       Pt has been accepted by [facility].  Nurse can call report to [phone] and ask to speak to the charge nurse.  Transport setup for 5pm by W/SAMANTA FUENTES in contact with CM and Medical staff. Will continue to follow and offer support as needed.     Michael Du, LISSETH  [facility]   Ext. [phone]

## 2019-06-28 NOTE — PLAN OF CARE
Problem: Adult Inpatient Plan of Care  Goal: Plan of Care Review  Outcome: Ongoing (interventions implemented as appropriate)  Pt remains free of falls or injuries, voices understanding of fall precautions. Verbalizes understanding of plan of care, insight poor. Denies SI/HI. Cooperative, compliant with meds. Pt became agitated, requested meds and pain meds, angry about urinating and unable to control limbs. PRN meds administered. Will continue to monitor.

## 2019-06-28 NOTE — ASSESSMENT & PLAN NOTE
34 y/o IV drug abuser with large R ICH with IVH.  MRV negative.  Angiogram completed w/ no signs of AVM or aneurysm but concern for vasculitis. 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       Antithrombotics: None ICH    Statins: None ICH    Aggressive risk factor modification: drug use, smoker     Rehab efforts: The patient has been evaluated by a stroke team provider and the therapy needs have been fully considered based off the presenting complaints and exam findings. The following therapy evaluations are needed: PT evaluate and treat, OT evaluate and treat, SLP evaluate and treat, PM&R evaluate for appropriate placement - recommending rehab    Diagnostics ordered/pending: CT for any changes in neurological status    VTE prophylaxis: SCDs only large ICH    BP parameters: ICH: SBP <160

## 2019-06-28 NOTE — ASSESSMENT & PLAN NOTE
Psych signed off 6/27  Improved - Discontinued CEC 6/27  Zyprexa ordered for non redirectable agitation

## 2019-06-28 NOTE — PT/OT/SLP PROGRESS
"Speech Language Pathology Treatment    Patient Name:  Shahid Lackey   MRN:  [id number]  Admitting Diagnosis: Nontraumatic cortical hemorrhage of cerebral hemisphere    Recommendations:                 General Recommendations:  Dysphagia therapy, Speech/language therapy and Cognitive-linguistic therapy  Diet recommendations:  Regular, Liquid Diet Level: Thin   Aspiration Precautions: Strict aspiration precautions   General Precautions: Standard, aspiration, fall  Communication strategies:  provide increased time to answer and go to room if call light pushed    Subjective     "I don't think I am doing as well as I should but it y'all say I am, I'll believe you."    Pain/Comfort:  · Pain Rating 1: 9/10  · Location 1: head  · Pain Addressed 1: Distraction  · Pain Rating Post-Intervention 1: 9/10    Objective:     Has the patient been evaluated by SLP for swallowing?   Yes  Keep patient NPO? No   Current Respiratory Status: room air      Pt seen bedside, alert and cooperative.  Pt continues with verbalized confusion re: recent events with mild perseveration and confabulation.  He is easily redirectable to topic at hand.  Pt able to localize to L indptly though min cues to locate items on L side of tray.  Pt taking large bites/sips though no overt s/s aspiration with regular lunch tray with thin liquids via straw.  Education provided on role of SLP and ongoing POC.  Pt will require ongoing education.      Assessment:     Shahid Lackey is a 33 y.o. male with an SLP diagnosis of Cognitive-Linguistic Impairment and Visio-Spatial Impairment.      Goals:   Multidisciplinary Problems     SLP Goals        Problem: SLP Goal    Goal Priority Disciplines Outcome   SLP Goal     SLP Ongoing (interventions implemented as appropriate)   Description:  SLP goals to be met by 7/4  1.Pt will tolerate diet of dental soft solids/ thin liquids with no overt signs of aspiration-met  2. Pt will complete simple L scanning tasks with 80% " accuracy with max A.  3. Pt will complete orientation tasks with 90% accuracy and min cues to increase safety awareness  4. Pt will complete problem solving/reasoning tasks with 80% accuracy and min cues to increase safety awareness-met   5. Pt will participate in ongoing assessment of reading and writing   6. Pt will participate in ongoing assessment of cognitive-linguistic skills     Updated:  1. Pt will tolerate diet of regular solids/thin liquids with no overt signs of aspiration.   2. Pt will complete problem solving/reasoning tasks with 90% accuracy and min cues to increase safety awareness.                      Plan:     · Patient to be seen:  4 x/week   · Plan of Care expires:  07/19/19  · Plan of Care reviewed with:  patient   · SLP Follow-Up:  Yes       Discharge recommendations:  rehabilitation facility   Barriers to Discharge:  Level of Skilled Assistance Needed      Time Tracking:     SLP Treatment Date:   06/28/19  Speech Start Time:  1124  Speech Stop Time:  1145     Speech Total Time (min):  21 min    Billable Minutes: Speech Therapy Individual 11 and Treatment Swallowing Dysfunction 10    MART Trujillo, CCC-SLP  06/28/2019

## 2019-07-18 NOTE — TELEPHONE ENCOUNTER
----- Message from Vivian Kirk RN sent at 7/1/2019  7:52 AM CDT -----  Please schedule a neuro followup appt for 4-6 weeks. Patient was inpatient and seen by Dr Henley.

## 2019-09-18 PROBLEM — G93.40 ACUTE ENCEPHALOPATHY: Status: ACTIVE | Noted: 2019-01-01

## 2019-09-18 PROBLEM — J96.01 ACUTE RESPIRATORY FAILURE WITH HYPOXIA: Status: ACTIVE | Noted: 2019-01-01

## 2019-09-18 PROBLEM — I46.9 CARDIAC ARREST: Status: ACTIVE | Noted: 2019-01-01

## 2019-09-18 PROBLEM — T50.901A DRUG OVERDOSE: Status: ACTIVE | Noted: 2019-01-01

## 2019-09-18 PROBLEM — N17.9 AKI (ACUTE KIDNEY INJURY): Status: ACTIVE | Noted: 2019-01-01

## 2019-09-18 PROBLEM — E87.20 METABOLIC ACIDOSIS: Status: ACTIVE | Noted: 2019-01-01

## 2019-09-18 PROBLEM — K92.2 UPPER GI BLEED: Status: ACTIVE | Noted: 2019-01-01

## 2019-09-18 PROBLEM — G93.1 ANOXIC BRAIN INJURY: Status: ACTIVE | Noted: 2019-01-01

## 2019-09-18 PROBLEM — J96.02 ACUTE RESPIRATORY FAILURE WITH HYPOXIA AND HYPERCAPNIA: Status: ACTIVE | Noted: 2019-01-01

## 2019-09-18 NOTE — LOPA/MORA/SWTA/AOC/AEB
[facility] ([facility])  On-Site Evaluation  [facility] Contact # [phone]        Thank you for the referral of this patient to determine suitability for organ, tissue, and eye donation.  A chart review has been conducted (date): 09/18/19  at (time) 16:00 .  [facility] findings are as follows:    ? Potential candidate for organ donation - [facility] following patient. Any changes in patients condition, discussion of withdrawing the vent or brain death exams, Immediately call [phone].    ? Potential for candidate for tissue and eye donation- call [facility] at [phone] within 2 hours of death for screening as a potential tissue and/or eye donor.      ? Potential candidate for eye donation - call [facility] at [phone] within 2 hours of death for screening as a potential eye donor.      [facility] Representative:  Orquidea Wolfe RN, BSN        [facility] Referral Number:   [id number]

## 2019-09-18 NOTE — PLAN OF CARE
09/18/19 1622   Post-Acute Status   Post-Acute Authorization Other   Other Status Awaiting f/u Appts   Discharge Delays (!) Other  (Unconscious in ICU)

## 2019-09-18 NOTE — H&P
"[facility] Hospital Medicine  History & Physical    Patient Name: Shahid Lackey  MRN: [id number]  Admission Date: 9/18/2019  Attending Physician: Yo Hart MD   Primary Care Provider: Artem Rice MD      Patient information was obtained from relative(s), EMS personnel, past medical records and ER records.     Subjective:     Principal Problem:Cardiac arrest    Chief Complaint:   Chief Complaint   Patient presents with    Cardiac Arrest        HPI: HPI obtained from PMH and chart review due to patient's acuity on condition.    32 yo male with hx of polysubstance abuse with IVDU (with multiple admission in the past for OD), Hep C, Tobacco abuse and prior ICH presented via EMS to [place] s/p cardiac arrest. He was found down by family around 8:15 AM prompting EMS. He was pulseless on EMS arrive with asystole on rhythm check. ACLS started, IO placed, and CPR continued while en route s/p ROSC en route. Given narcan with no response. Upon ED arrival, he had a pulse but GCS 3, intubated immediately for airway protection, on minimal vent settings. He was noted to have bilateral fixed, dilated pupils and no gag relfex. Right subclavian CVC placed, started on epi drip. He was noted to have blood in his NGT, suspected possible UGIB and subsequently started on Protonix priot to transfer.    OSH labs noting Hgb 14.6, lactate 11, gap 20, bicarb 22, Cr 1.5, Na 146, wbc 20, uds positive for benzo and THC. AGB with severe combined metabolic and respiratory acidosis (pH 7.02). CXR wnl. CTH noting "Diffuse loss of gray-white differentiation with over effacement of overlying cortical sulci and partial effacement of the lateral ventricles consistent with hypoxic anoxic insult and diffuse brain edema. Old areas of encephalomalacia right more extensively than left parietal region and intervening genu of corpus callosum as was previously seen." . Patient was transferred to [facility] ICU for neuro evalaution " for suspected anoxic brain injury.      Past Medical History:   Diagnosis Date    Current smoker     Drug abuse, IV     Hepatitis C     HIV (human immunodeficiency virus infection)        Past Surgical History:   Procedure Laterality Date    INSERT ARTERIAL LINE  6/20/2019            Review of patient's allergies indicates:  No Known Allergies    Current Facility-Administered Medications on File Prior to Encounter   Medication    [COMPLETED] cefTRIAXone (ROCEPHIN) 1 g in dextrose 5 % 50 mL IVPB    [COMPLETED] naloxone 0.4 mg/mL injection 0.4 mg    [COMPLETED] pantoprazole 40 mg in dextrose 5 % 100 mL infusion (ready to mix system)    [COMPLETED] pantoprazole injection 80 mg    [COMPLETED] sodium chloride 0.9% bolus 1,000 mL    [COMPLETED] sodium chloride 0.9% bolus 1,000 mL    [COMPLETED] tranexamic acid (CYKLOKAPRON) 771 mg in sodium chloride 0.9% 100 mL    [DISCONTINUED] acetaminophen tablet 1,000 mg    [DISCONTINUED] EPINEPHrine (ADRENALIN) 10 mg in sodium chloride 0.9% 250 mL infusion    [DISCONTINUED] EPINEPHrine (ADRENALIN) 5 mg in sodium chloride 0.9% 250 mL infusion    [DISCONTINUED] tranexamic acid (CYKLOKAPRON) 1,000 mg in sodium chloride 0.9 % 100 mL (ready to mix system)     Current Outpatient Medications on File Prior to Encounter   Medication Sig    acetaminophen (TYLENOL) 325 MG tablet Take 2 tablets (650 mg total) by mouth every 6 (six) hours as needed.    amLODIPine (NORVASC) 5 MG tablet Take 1 tablet (5 mg total) by mouth once daily.    buprenorphine-naloxone (SUBOXONE) 8-2 mg Film Place 2 each under the tongue once daily.    FLUoxetine 40 MG capsule Take 40 mg by mouth once daily.    folic acid (FOLVITE) 1 MG tablet Take 1 tablet (1 mg total) by mouth once daily.    magnesium oxide (MAG-OX) 400 mg (241.3 mg magnesium) tablet Take 1 tablet (400 mg total) by mouth once daily.    OLANZapine (ZYPREXA) 5 MG tablet Take 1 tablet (5 mg total) by mouth every 6 (six) hours as needed  (agitation).    senna-docusate 8.6-50 mg (PERICOLACE) 8.6-50 mg per tablet Take 1 tablet by mouth 2 (two) times daily.    thiamine 100 MG tablet Take 1 tablet (100 mg total) by mouth once daily.     Family History     Problem Relation (Age of Onset)    No Known Problems Mother, Father        Tobacco Use    Smoking status: Current Every Day Smoker     Packs/day: 1.00    Smokeless tobacco: Never Used   Substance and Sexual Activity    Alcohol use: No    Drug use: Yes     Types: IV    Sexual activity: Not Currently     Review of Systems   Unable to perform ROS: Acuity of condition     Objective:     Vital Signs (Most Recent):  Temp: (!) 95 °F (35 °C) (09/18/19 1415)  Pulse: 102 (09/18/19 1515)  Resp: 16 (09/18/19 1515)  BP: 127/66 (09/18/19 1500)  SpO2: (!) 94 % (09/18/19 1515) Vital Signs (24h Range):  Temp:  [95 °F (35 °C)-98.3 °F (36.8 °C)] 95 °F (35 °C)  Pulse:  [] 102  Resp:  [11-27] 16  SpO2:  [88 %-100 %] 94 %  BP: ()/() 127/66     Weight: 63.3 kg (139 lb 8.8 oz)  Body mass index is 21.22 kg/m².    Physical Exam   Constitutional:   Intubated   HENT:   Head: Normocephalic and atraumatic.   ET tube in place   Eyes: Conjunctivae are normal. No scleral icterus.   Dilated fixed pupils. No pupillary reflex. No corneal reflex.    Neck: Neck supple.   Cardiovascular: Regular rhythm. Exam reveals no gallop and no friction rub.   No murmur heard.  Tachycardic   Pulmonary/Chest: Effort normal and breath sounds normal.   Intubated. Scattered rhonchi. No wheeze or rales   Abdominal: Soft. He exhibits no distension and no mass.   Hypoactive BS   Genitourinary:   Genitourinary Comments: Lackey in place   Musculoskeletal: He exhibits no edema or deformity.   Neurological:   No sedation on exam. Unresponsive. Not following command. No movement. No responsive to sternal rubs. Pupillary and corneal reflex absent. Babinski neg. No myoclonus noted.   Skin: Capillary refill takes less than 2 seconds.   Cool  "and dry   Nursing note and vitals reviewed.       Significant Labs: All pertinent labs within the past 24 hours have been reviewed.    Significant Imaging: I have reviewed and interpreted all pertinent imaging results/findings within the past 24 hours.    Assessment/Plan:     * Cardiac arrest  Presented after being found down, unknown during of time, suspecting from drug overdose, etoh was positive. Pulseless on EMS arrival with asystole on rhythm. ACLS started (~20 mins from found), given epi and narcan. Rosc obtained prior to arrival to [facility] ED. He was noted to have bilateral fixed, dilated pupils and no gag relfex. GCS 3, intubated for airway protecting, on minimal vent settings. Right Subclavian placed and started on epi drip. Labs at OSH with lactate of 11, gap 20, bicarb 22, Cr 1.5, Na 146, wbc 10, uds positive for benzo and THC. AGB with severe combined metabolic and respiratory acidosis (pH 7.02). CXR wnl. CTH noting "Diffuse loss of gray-white differentiation with over effacement of overlying cortical sulci and partial effacement of the lateral ventricles consistent with hypoxic anoxic insult and diffuse brain edema. Old areas of encephalomalacia right more extensively than left parietal region and intervening genu of corpus callosum as was previously seen." Patient was [facility] ICU for neuro evalaution for suspected anoxic brain injury.  - Asystole cardiac arrest suspected from drug overdose, with CTH concerning for suspected anoxic brain injury  - Hypothermia protocol, avoid fever in the first 48 hrs post arrest  - Vasopressors as needed, he is currently off pressors, and hypertensive  - Pulmonary consulted for co-mgmt, appreciate assistance / recs  - Neurology consulted for prognostication for anoxic brain injury  - Frequent neuro check  - Very poor prognosis overall  - I have discussed / updated family (fatoumata sister) regarding course and prognosis    Acute respiratory failure with " hypercapnia  Secondary to above  Pulmonary consulted  On minimal vent setting at this time    Anoxic brain injury  Secondary to above. Neurology consulted    Acute encephalopathy  Secondary to above. Metabolic and toxic encephalopathy.    Drug overdose  Likely cause of his cardiac arrest. Several admission for OD this past yr.    Metabolic acidosis  Secondary to above    GRISEL (acute kidney injury)  Secondary to cardiac arrest and decrease CO    Upper GI bleed  NTG with 300 cc of blood   Type and screen  Serial H/H and transfuse as needed    Drug abuse, IV  Noted.    History of hepatitis C  Noted.        VTE Risk Mitigation (From admission, onward)        Ordered     Reason for No Pharmacological VTE Prophylaxis  Once      09/18/19 1428     IP VTE HIGH RISK PATIENT  Once      09/18/19 1428     Place sequential compression device  Until discontinued      09/18/19 1419        Critical care time spent on the evaluation and treatment of severe organ dysfunction, review of pertinent labs and imaging studies, discussions with consulting providers and discussions with patient/family: > 70 minutes.     Yo Hart MD  Department of Hospital Medicine   [facility]

## 2019-09-18 NOTE — SUBJECTIVE & OBJECTIVE
Past Medical History:   Diagnosis Date    Current smoker     Drug abuse, IV     Hepatitis C     HIV (human immunodeficiency virus infection)        Past Surgical History:   Procedure Laterality Date    INSERT ARTERIAL LINE  6/20/2019            Review of patient's allergies indicates:  No Known Allergies    Current Facility-Administered Medications on File Prior to Encounter   Medication    [COMPLETED] cefTRIAXone (ROCEPHIN) 1 g in dextrose 5 % 50 mL IVPB    [COMPLETED] naloxone 0.4 mg/mL injection 0.4 mg    [COMPLETED] pantoprazole 40 mg in dextrose 5 % 100 mL infusion (ready to mix system)    [COMPLETED] pantoprazole injection 80 mg    [COMPLETED] sodium chloride 0.9% bolus 1,000 mL    [COMPLETED] sodium chloride 0.9% bolus 1,000 mL    [COMPLETED] tranexamic acid (CYKLOKAPRON) 771 mg in sodium chloride 0.9% 100 mL    [DISCONTINUED] acetaminophen tablet 1,000 mg    [DISCONTINUED] EPINEPHrine (ADRENALIN) 10 mg in sodium chloride 0.9% 250 mL infusion    [DISCONTINUED] EPINEPHrine (ADRENALIN) 5 mg in sodium chloride 0.9% 250 mL infusion    [DISCONTINUED] tranexamic acid (CYKLOKAPRON) 1,000 mg in sodium chloride 0.9 % 100 mL (ready to mix system)     Current Outpatient Medications on File Prior to Encounter   Medication Sig    acetaminophen (TYLENOL) 325 MG tablet Take 2 tablets (650 mg total) by mouth every 6 (six) hours as needed.    amLODIPine (NORVASC) 5 MG tablet Take 1 tablet (5 mg total) by mouth once daily.    buprenorphine-naloxone (SUBOXONE) 8-2 mg Film Place 2 each under the tongue once daily.    FLUoxetine 40 MG capsule Take 40 mg by mouth once daily.    folic acid (FOLVITE) 1 MG tablet Take 1 tablet (1 mg total) by mouth once daily.    magnesium oxide (MAG-OX) 400 mg (241.3 mg magnesium) tablet Take 1 tablet (400 mg total) by mouth once daily.    OLANZapine (ZYPREXA) 5 MG tablet Take 1 tablet (5 mg total) by mouth every 6 (six) hours as needed (agitation).    senna-docusate 8.6-50  mg (PERICOLACE) 8.6-50 mg per tablet Take 1 tablet by mouth 2 (two) times daily.    thiamine 100 MG tablet Take 1 tablet (100 mg total) by mouth once daily.     Family History     Problem Relation (Age of Onset)    No Known Problems Mother, Father        Tobacco Use    Smoking status: Current Every Day Smoker     Packs/day: 1.00    Smokeless tobacco: Never Used   Substance and Sexual Activity    Alcohol use: No    Drug use: Yes     Types: IV    Sexual activity: Not Currently     Review of Systems   Unable to perform ROS: Acuity of condition     Objective:     Vital Signs (Most Recent):  Temp: (!) 95 °F (35 °C) (09/18/19 1415)  Pulse: 102 (09/18/19 1515)  Resp: 16 (09/18/19 1515)  BP: 127/66 (09/18/19 1500)  SpO2: (!) 94 % (09/18/19 1515) Vital Signs (24h Range):  Temp:  [95 °F (35 °C)-98.3 °F (36.8 °C)] 95 °F (35 °C)  Pulse:  [] 102  Resp:  [11-27] 16  SpO2:  [88 %-100 %] 94 %  BP: ()/() 127/66     Weight: 63.3 kg (139 lb 8.8 oz)  Body mass index is 21.22 kg/m².    Physical Exam   Constitutional:   Intubated   HENT:   Head: Normocephalic and atraumatic.   ET tube in place   Eyes: Conjunctivae are normal. No scleral icterus.   Dilated fixed pupils. No pupillary reflex. No corneal reflex.    Neck: Neck supple.   Cardiovascular: Regular rhythm. Exam reveals no gallop and no friction rub.   No murmur heard.  Tachycardic   Pulmonary/Chest: Effort normal and breath sounds normal.   Intubated. Scattered rhonchi. No wheeze or rales   Abdominal: Soft. He exhibits no distension and no mass.   Hypoactive BS   Genitourinary:   Genitourinary Comments: Lackey in place   Musculoskeletal: He exhibits no edema or deformity.   Neurological:   No sedation on exam. Unresponsive. Not following command. No movement. No responsive to sternal rubs. Pupillary and corneal reflex absent. Babinski neg. No myoclonus noted.   Skin: Capillary refill takes less than 2 seconds.   Cool and dry   Nursing note and vitals  reviewed.       Significant Labs: All pertinent labs within the past 24 hours have been reviewed.    Significant Imaging: I have reviewed and interpreted all pertinent imaging results/findings within the past 24 hours.

## 2019-09-18 NOTE — ASSESSMENT & PLAN NOTE
- found down by family, unwitnessed arrest. Original rhythm asystole. ACLS X20 mins   - CT head concerning for anoxic insult  - Neurology consulted  - maintain normothermia  - Right neck fasciculations on exam, concern for myoclonus  - Frequent neuro exams  - Repeat head CT tomorrow morning
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
## 2019-09-18 NOTE — HPI
"HPI obtained from PMH and chart review due to patient's acuity on condition.    34 yo male with hx of polysubstance abuse with IVDU (with multiple admission in the past for OD), Hep C, Tobacco abuse and prior ICH presented via EMS to [place] s/p cardiac arrest. He was found down by family around 8:15 AM prompting EMS. He was pulseless on EMS arrive with asystole on rhythm check. ACLS started, IO placed, and CPR continued while en route s/p ROSC en route. Given narcan with no response. Upon ED arrival, he had a pulse but GCS 3, intubated immediately for airway protection, on minimal vent settings. He was noted to have bilateral fixed, dilated pupils and no gag relfex. Right subclavian CVC placed, started on epi drip. He was noted to have blood in his NGT, suspected possible UGIB and subsequently started on Protonix priot to transfer.    OSH labs noting Hgb 14.6, lactate 11, gap 20, bicarb 22, Cr 1.5, Na 146, wbc 20, uds positive for benzo and THC. AGB with severe combined metabolic and respiratory acidosis (pH 7.02). CXR wnl. CTH noting "Diffuse loss of gray-white differentiation with over effacement of overlying cortical sulci and partial effacement of the lateral ventricles consistent with hypoxic anoxic insult and diffuse brain edema. Old areas of encephalomalacia right more extensively than left parietal region and intervening genu of corpus callosum as was previously seen." . Patient was transferred to [facility] ICU for neuro evalaution for suspected anoxic brain injury.  "

## 2019-09-18 NOTE — HPI
Mr. Lackey is a 33-year-old man with a history of IVDA and prior CVA who presented to [facility] after being found down by his family at 8:15 AM today. Upon EMS arrival he was pulseless with asystole initial rhythm check. ACLS protocol was started, and he received CPR and epi. He was given Narcan with no response per nursing documentation (though the attending physician unsure and will be repeating administration). He received ACLS protocol for about 20 minutes while en route to the ED, where he had a pulse but needed to be intubated for airway protection given GCS of 3. Also with bilateral fixed and dilated pupils and no gag reflex. No myoclonic jerking.      In addition to intubation, he has also been treated with an epi drip, administered via right-sided subclavian line placed emergently in the ED. The referring physician has him on minimal vent settings, decreasing the FiO2 due to over oxygenation on most recent ABG (PaO2 of 148). He has an anion-gap metabolic acidosis with bicarb 22, gap 20, and lactate of > 11. I recommended in he increase the vent rate from 20 to help with what looks like a combined metabolic and hypercapnic acidosis. Workup also shows track marks on physical examination and UDS positive for benzodiazpeines and THC. The ED physician at [facility] notes a recent influx of fentanyl-related deaths in the area and suspects this is the precipitant, particularly with family reports of a stranger in the house prior to the event. CXR normal aside from right-sided SC catheter in place. CT head with loss of gray-white differentiation with over effacement of overlying cortical sulci and partial effacement of the lateral ventricles consistent with hypoxic anoxic insult and diffuse brain edema. Prior to transfer, NG tube with 300ml bright red blood noted, started on protonix gtt.     Critical Care Medicine was consulted for post cardiac arrest management.

## 2019-09-18 NOTE — PLAN OF CARE
Problem: Adult Inpatient Plan of Care  Goal: Plan of Care Review  Outcome: Ongoing (interventions implemented as appropriate)  Pt admitted to ICU from [facility] this shift. Unresponsive. No reaction to any stimuli. Pupils fixed and dilated. Intubated on PRVC mode. 200 ml bloody output noted from NGT to R nare. D5 1/2 infusing @ 125 ml/hr. 1L NS bolus administered. Levo available if needed for low BP. R chest swollen around TLC, stat CXR pending and MD aware. K 5.6 - lactulose administered. Lackey with adequate UOP. No BM this shift. Afebrile. Remains free of falls, injuries and new breakdown.

## 2019-09-18 NOTE — ASSESSMENT & PLAN NOTE
"Presented after being found down, unknown during of time, suspecting from drug overdose, etoh was positive. Pulseless on EMS arrival with asystole on rhythm. ACLS started (~20 mins from found), given epi and narcan. Rosc obtained prior to arrival to [facility] ED. He was noted to have bilateral fixed, dilated pupils and no gag relfex. GCS 3, intubated for airway protecting, on minimal vent settings. Right Subclavian placed and started on epi drip. Labs at OSH with lactate of 11, gap 20, bicarb 22, Cr 1.5, Na 146, wbc 10, uds positive for benzo and THC. AGB with severe combined metabolic and respiratory acidosis (pH 7.02). CXR wnl. CTH noting "Diffuse loss of gray-white differentiation with over effacement of overlying cortical sulci and partial effacement of the lateral ventricles consistent with hypoxic anoxic insult and diffuse brain edema. Old areas of encephalomalacia right more extensively than left parietal region and intervening genu of corpus callosum as was previously seen." Patient was transferred to [facility] ICU for neuro evalaution for suspected anoxic brain injury.  - Asystole cardiac arrest suspected from drug overdose, with CTH concerning for suspected anoxic brain injury  - Hypothermia protocol, avoid fever in the first 48 hrs post arrest  - Vasopressors as needed, he is currently off pressors, and hypertensive  - Pulmonary consulted for co-mgmt, appreciate assistance / recs  - Neurology consulted for prognostication for anoxic brain injury  - Frequent neuro check  - Very poor prognosis overall  - I have discussed / updated family (fatoumata sister) regarding course and prognosis  "

## 2019-09-18 NOTE — SUBJECTIVE & OBJECTIVE
Past Medical History:   Diagnosis Date    Current smoker     Drug abuse, IV     Hepatitis C     HIV (human immunodeficiency virus infection)        Past Surgical History:   Procedure Laterality Date    INSERT ARTERIAL LINE  6/20/2019            Review of patient's allergies indicates:  No Known Allergies    Family History     Problem Relation (Age of Onset)    No Known Problems Mother, Father        Tobacco Use    Smoking status: Current Every Day Smoker     Packs/day: 1.00    Smokeless tobacco: Never Used   Substance and Sexual Activity    Alcohol use: No    Drug use: Yes     Types: IV    Sexual activity: Not Currently         Review of Systems   Unable to perform ROS: Intubated     Objective:     Vital Signs (Most Recent):  Temp: (!) 95 °F (35 °C) (09/18/19 1415)  Pulse: (!) 115 (09/18/19 1415)  Resp: (!) 23 (09/18/19 1415)  BP: (!) 192/131 (09/18/19 1415)  SpO2: 95 % (09/18/19 1415) Vital Signs (24h Range):  Temp:  [95 °F (35 °C)-98.3 °F (36.8 °C)] 95 °F (35 °C)  Pulse:  [] 115  Resp:  [11-23] 23  SpO2:  [88 %-100 %] 95 %  BP: ()/() 192/131     Weight: 63.3 kg (139 lb 8.8 oz)  Body mass index is 21.22 kg/m².    No intake or output data in the 24 hours ending 09/18/19 1443    Physical Exam   Constitutional: He appears well-developed and well-nourished. He has a sickly appearance. No distress. He is intubated.   HENT:   Head: Normocephalic and atraumatic.   Eyes: No scleral icterus. Right pupil is not reactive. Left pupil is not reactive.   Neck: Neck supple. No tracheal deviation present.   Cardiovascular: Regular rhythm and intact distal pulses. Tachycardia present.   No murmur heard.  Pulmonary/Chest: He is intubated. He has no wheezes. He has rhonchi. He has no rales.   Agonal breathing pattern   Abdominal: Soft. Bowel sounds are normal. There is no rigidity.   Genitourinary:   Genitourinary Comments: Lackey in place   Musculoskeletal: He exhibits no edema.   Neurological: He is  unresponsive.   Pupils 9mm and fixed bilaterally. No cough, gag, or corneal reflexes. Agonal breathing pattern noted. No response to pain. Fasciculations to right neck noted, seizures?   Skin: Skin is warm and dry. Capillary refill takes 2 to 3 seconds. He is not diaphoretic.   Nursing note and vitals reviewed.      Vents:  Vent Mode: PRVC (09/18/19 1411)  Oxygen Concentration (%): 50 (09/18/19 1415)    Lines/Drains/Airways     Central Venous Catheter Line                 Percutaneous Central Line Insertion/Assessment - triple lumen  09/18/19 1015 right subclavian less than 1 day          Drain                 NG/OG Tube 09/18/19 0933 nasogastric 18 Fr. Right nostril less than 1 day         Urethral Catheter 09/18/19 0945 Straight-tip 18 Fr. less than 1 day          Airway                 Airway - Non-Surgical 09/18/19 0927 Endotracheal Tube less than 1 day          Peripheral Intravenous Line                 Peripheral IV - Single Lumen 09/18/19 0925 18 G Right Antecubital less than 1 day         Peripheral IV - Single Lumen 09/18/19 0926 18 G Left Forearm less than 1 day                Significant Labs:    CBC/Anemia Profile:  Recent Labs   Lab 09/18/19  0925 09/18/19  1021 09/18/19  1029 09/18/19  1148 09/18/19  1214   WBC 11.60  --   --   --  20.10*   HGB 14.6  --   --   --  15.5   HCT 47.4 34* 36  --  47.7     --   --   --  370*   *  --   --   --  97   RDW 16.4*  --   --   --  15.6*   OCCULTBLOOD  --   --   --  Positive*  --         Chemistries:  Recent Labs   Lab 09/18/19  0925   *   K 4.4      CO2 22*   BUN 7   CREATININE 1.5*   CALCIUM 8.7   ALBUMIN 3.9   PROT 6.6   BILITOT 0.6   ALKPHOS 81   *   *       All pertinent labs within the past 24 hours have been reviewed.    Significant Imaging:   I have reviewed and interpreted all pertinent imaging results/findings within the past 24 hours.

## 2019-09-18 NOTE — ASSESSMENT & PLAN NOTE
- 300 ml bright red blood from NG tube, likely traumatic  - IV PPI BID  - CBC q4h  - transfuse for Hgb <7  - NPO

## 2019-09-18 NOTE — CONSULTS
[facility]  Pulmonology  Consult Note    Patient Name: Shahid Lackey  MRN: [id number]  Admission Date: 9/18/2019  Hospital Length of Stay: 0 days  Code Status: Full Code  Attending Physician: Yo Hart MD  Primary Care Provider: Artem Rice MD   Principal Problem: Cardiac arrest    Inpatient consult to Pulmonology  Consult performed by: Danielle Hale NP  Consult ordered by: Yo Hart MD        Subjective:     HPI:  Mr. Lackey is a 33-year-old man with a history of IVDA and prior CVA who presented to [facility] after being found down by his family at 8:15 AM today. Upon EMS arrival he was pulseless with asystole initial rhythm check. ACLS protocol was started, and he received CPR and epi. He was given Narcan with no response per nursing documentation (though the attending physician unsure and will be repeating administration). He received ACLS protocol for about 20 minutes while en route to the ED, where he had a pulse but needed to be intubated for airway protection given GCS of 3. Also with bilateral fixed and dilated pupils and no gag reflex. No myoclonic jerking.      In addition to intubation, he has also been treated with an epi drip, administered via right-sided subclavian line placed emergently in the ED. The referring physician has him on minimal vent settings, decreasing the FiO2 due to over oxygenation on most recent ABG (PaO2 of 148). He has an anion-gap metabolic acidosis with bicarb 22, gap 20, and lactate of > 11. I recommended in he increase the vent rate from 20 to help with what looks like a combined metabolic and hypercapnic acidosis. Workup also shows track marks on physical examination and UDS positive for benzodiazpeines and THC. The ED physician at [facility] notes a recent influx of fentanyl-related deaths in the area and suspects this is the precipitant, particularly with family reports of a stranger in the house prior to the event. CXR normal aside from  right-sided SC catheter in place. CT head with loss of gray-white differentiation with over effacement of overlying cortical sulci and partial effacement of the lateral ventricles consistent with hypoxic anoxic insult and diffuse brain edema. Prior to transfer, NG tube with 300ml bright red blood noted, started on protonix gtt.     Critical Care Medicine was consulted for post cardiac arrest management.     Past Medical History:   Diagnosis Date    Current smoker     Drug abuse, IV     Hepatitis C     HIV (human immunodeficiency virus infection)        Past Surgical History:   Procedure Laterality Date    INSERT ARTERIAL LINE  6/20/2019            Review of patient's allergies indicates:  No Known Allergies    Family History     Problem Relation (Age of Onset)    No Known Problems Mother, Father        Tobacco Use    Smoking status: Current Every Day Smoker     Packs/day: 1.00    Smokeless tobacco: Never Used   Substance and Sexual Activity    Alcohol use: No    Drug use: Yes     Types: IV    Sexual activity: Not Currently         Review of Systems   Unable to perform ROS: Intubated     Objective:     Vital Signs (Most Recent):  Temp: (!) 95 °F (35 °C) (09/18/19 1415)  Pulse: (!) 115 (09/18/19 1415)  Resp: (!) 23 (09/18/19 1415)  BP: (!) 192/131 (09/18/19 1415)  SpO2: 95 % (09/18/19 1415) Vital Signs (24h Range):  Temp:  [95 °F (35 °C)-98.3 °F (36.8 °C)] 95 °F (35 °C)  Pulse:  [] 115  Resp:  [11-23] 23  SpO2:  [88 %-100 %] 95 %  BP: ()/() 192/131     Weight: 63.3 kg (139 lb 8.8 oz)  Body mass index is 21.22 kg/m².    No intake or output data in the 24 hours ending 09/18/19 1443    Physical Exam   Constitutional: He appears well-developed and well-nourished. He has a sickly appearance. No distress. He is intubated.   HENT:   Head: Normocephalic and atraumatic.   Eyes: No scleral icterus. Right pupil is not reactive. Left pupil is not reactive.   Neck: Neck supple. No tracheal deviation  present.   Cardiovascular: Regular rhythm and intact distal pulses. Tachycardia present.   No murmur heard.  Pulmonary/Chest: He is intubated. He has no wheezes. He has rhonchi. He has no rales.   Agonal breathing pattern   Abdominal: Soft. Bowel sounds are normal. There is no rigidity.   Genitourinary:   Genitourinary Comments: Lackey in place   Musculoskeletal: He exhibits no edema.   Neurological: He is unresponsive.   Pupils 9mm and fixed bilaterally. No cough, gag, or corneal reflexes. Agonal breathing pattern noted. No response to pain. Fasciculations to right neck noted, seizures?   Skin: Skin is warm and dry. Capillary refill takes 2 to 3 seconds. He is not diaphoretic.   Nursing note and vitals reviewed.      Vents:  Vent Mode: PRVC (09/18/19 1411)  Oxygen Concentration (%): 50 (09/18/19 1415)    Lines/Drains/Airways     Central Venous Catheter Line                 Percutaneous Central Line Insertion/Assessment - triple lumen  09/18/19 1015 right subclavian less than 1 day          Drain                 NG/OG Tube 09/18/19 0933 nasogastric 18 Fr. Right nostril less than 1 day         Urethral Catheter 09/18/19 0945 Straight-tip 18 Fr. less than 1 day          Airway                 Airway - Non-Surgical 09/18/19 0927 Endotracheal Tube less than 1 day          Peripheral Intravenous Line                 Peripheral IV - Single Lumen 09/18/19 0925 18 G Right Antecubital less than 1 day         Peripheral IV - Single Lumen 09/18/19 0926 18 G Left Forearm less than 1 day                Significant Labs:    CBC/Anemia Profile:  Recent Labs   Lab 09/18/19  0925 09/18/19  1021 09/18/19  1029 09/18/19  1148 09/18/19  1214   WBC 11.60  --   --   --  20.10*   HGB 14.6  --   --   --  15.5   HCT 47.4 34* 36  --  47.7     --   --   --  370*   *  --   --   --  97   RDW 16.4*  --   --   --  15.6*   OCCULTBLOOD  --   --   --  Positive*  --         Chemistries:  Recent Labs   Lab 09/18/19  0925   *   K 4.4       CO2 22*   BUN 7   CREATININE 1.5*   CALCIUM 8.7   ALBUMIN 3.9   PROT 6.6   BILITOT 0.6   ALKPHOS 81   *   *       All pertinent labs within the past 24 hours have been reviewed.    Significant Imaging:   I have reviewed and interpreted all pertinent imaging results/findings within the past 24 hours.    Assessment/Plan:     * Cardiac arrest  - found down by family, unwitnessed arrest. Original rhythm asystole. ACLS X20 mins   - CT head concerning for anoxic insult  - Neurology consulted, appreciate assistance   - maintain normothermia  - Right neck fasciculations on exam, concern for myoclonus  - Frequent neuro exams  - Repeat head CT tomorrow morning    Upper GI bleed  - 300 ml bright red blood from NG tube, likely traumatic  - IV PPI BID  - CBC q4h  - transfuse for Hgb <7  - NPO     GRISEL (acute kidney injury)  --Avoid ACEI/ARB/NSAIDS/Nephrotoxins.   --Renally dose all meds  --Strict I&Os      Acute respiratory failure with hypercapnia  - 2/2 cardiac arrest  - continue lung protective ventilation  - CXR with no focal consolidation  - minimal Fi02 requirements     Plan of care discussed with Dr. Rodriguez.      Critical Care Time: 70 minutes  Critical secondary to Patient has a condition that poses threat to life and bodily function:  Cardiac arrest     Critical care was time spent personally by me on the following activities: development of treatment plan with patient or surrogate and bedside caregivers, discussions with consultants, evaluation of patient's response to treatment, examination of patient, ordering and performing treatments and interventions, ordering and review of laboratory studies, ordering and review of radiographic studies, pulse oximetry, re-evaluation of patient's condition. This critical care time did not overlap with that of any other provider or involve time for any procedures.    Thank you for your consult. I will follow-up with patient. Please contact us if you have  any additional questions.     Danielle Hale NP  Pulmonology  [facility]

## 2019-09-18 NOTE — ASSESSMENT & PLAN NOTE
- 2/2 cardiac arrest  - continue lung protective ventilation  - CXR with no focal consolidation  - minimal Fi02 requirements

## 2019-09-19 NOTE — PROGRESS NOTES
Placed patient on T-piece at 15L and 50% with ET tube for 8-10 minutes.  Patient sats 96%.  Per Dr. Em and Star requests.

## 2019-09-19 NOTE — PROGRESS NOTES
[facility]  Neurology  Consult Note     Patient Name: Shahid Lackey  MRN: [id number]  Admission Date: 9/18/2019  Hospital Length of Stay: 1 days  Code Status: Full Code   Attending Provider: Yo Hart MD   Consulting Provider: Crow Graves MD  Primary Care Physician: Artem Rice MD  Principal Problem:Cardiac arrest     Inpatient consult to Neurology  Consult performed by: Crow Graves MD  Consult ordered by: Yo Hart MD        Subjective:      Chief Complaint/Reason for consult: S/P cardiac arrest      HPI: 32 y/o male with medical Hx as listed below was found unresponsive by family. Paramedics arrived to scene prompting ACLS protocol as pt was in asystole. This continued en-route to ED. Upon arrival to hospital he was noted to have fixed pupils, no gag reflex; apparently he did have spontneous respirations while in mechanical ventilator. Initially he was in an outside facility and trnasferred to this hospital for neurological evaluation.           Past Medical History:   Diagnosis Date    Current smoker      Drug abuse, IV      Hepatitis C      HIV (human immunodeficiency virus infection)                 Past Surgical History:   Procedure Laterality Date    INSERT ARTERIAL LINE   6/20/2019               Review of patient's allergies indicates:  No Known Allergies     Current Neurological Medications:          Current Facility-Administered Medications on File Prior to Encounter   Medication    [COMPLETED] cefTRIAXone (ROCEPHIN) 1 g in dextrose 5 % 50 mL IVPB    [COMPLETED] pantoprazole 40 mg in dextrose 5 % 100 mL infusion (ready to mix system)    [COMPLETED] pantoprazole injection 80 mg    [COMPLETED] tranexamic acid (CYKLOKAPRON) 771 mg in sodium chloride 0.9% 100 mL    [DISCONTINUED] EPINEPHrine (ADRENALIN) 10 mg in sodium chloride 0.9% 250 mL infusion    [DISCONTINUED] tranexamic acid (CYKLOKAPRON) 1,000 mg in sodium chloride 0.9 % 100 mL (ready to mix system)            Current Outpatient Medications on File Prior to Encounter   Medication Sig    acetaminophen (TYLENOL) 325 MG tablet Take 2 tablets (650 mg total) by mouth every 6 (six) hours as needed.    amLODIPine (NORVASC) 5 MG tablet Take 1 tablet (5 mg total) by mouth once daily.    buprenorphine-naloxone (SUBOXONE) 8-2 mg Film Place 2 each under the tongue once daily.    FLUoxetine 40 MG capsule Take 40 mg by mouth once daily.    folic acid (FOLVITE) 1 MG tablet Take 1 tablet (1 mg total) by mouth once daily.    magnesium oxide (MAG-OX) 400 mg (241.3 mg magnesium) tablet Take 1 tablet (400 mg total) by mouth once daily.    OLANZapine (ZYPREXA) 5 MG tablet Take 1 tablet (5 mg total) by mouth every 6 (six) hours as needed (agitation).    senna-docusate 8.6-50 mg (PERICOLACE) 8.6-50 mg per tablet Take 1 tablet by mouth 2 (two) times daily.    thiamine 100 MG tablet Take 1 tablet (100 mg total) by mouth once daily.           Family History      Problem Relation (Age of Onset)     No Known Problems Mother, Father                Tobacco Use    Smoking status: Current Every Day Smoker       Packs/day: 1.00    Smokeless tobacco: Never Used   Substance and Sexual Activity    Alcohol use: No    Drug use: Yes       Types: IV    Sexual activity: Not Currently      Review of Systems   Unable to perform ROS: Intubated      Objective:      Vital Signs (Most Recent):  Temp: (!) 95.2 °F (35.1 °C) (09/19/19 1145)  Pulse: 90 (09/19/19 1145)  Resp: (!) 28 (09/19/19 1145)  BP: (!) 131/93 (09/19/19 1145)  SpO2: 97 % (09/19/19 1145) Vital Signs (24h Range):  Temp:  [94.3 °F (34.6 °C)-96.3 °F (35.7 °C)] 95.2 °F (35.1 °C)  Pulse:  [] 90  Resp:  [11-31] 28  SpO2:  [92 %-98 %] 97 %  BP: ()/() 131/93      Weight: 63.3 kg (139 lb 8.8 oz)  Body mass index is 21.22 kg/m².     Physical Exam   Constitutional: He appears well-developed.   HENT:   Head: Normocephalic.   Eyes: Right eye exhibits no discharge. Left eye  exhibits no discharge.   Neck: Normal range of motion.   Cardiovascular: Regular rhythm.   Pulmonary/Chest: Breath sounds normal.   Abdominal: Bowel sounds are normal.   Musculoskeletal: He exhibits no edema.      Neurological evaluation  No response to verbal stimuli  Pupils are round at 6 mm; not reactive to light  No oculocephalic response bilaterally  No vestibuloocular reflex bilaterally  No gag reflex  No cough reflex  No jaw jerk  No response to noxious stimulation to extremtieis        Significant Labs:   CBC:         Recent Labs   Lab 09/19/19  0419 09/19/19  0754 09/19/19  1020   WBC 22.50* 20.00* 16.93*   HGB 16.3 16.1 16.3   HCT 49.8 48.2 49.8    253 217      CMP:          Recent Labs   Lab 09/18/19  0925 09/18/19  1449 09/18/19  1847 09/19/19  0754   * 98 167* 181*   * 144 147* 147*   K 4.4 5.6* 4.4 4.0    114* 116* 118*   CO2 22* 14* 17* 21*   BUN 7 12 14 20   CREATININE 1.5* 1.7* 2.0* 1.9*   CALCIUM 8.7 7.5* 7.1* 7.1*   PROT 6.6  --   --   --    ALBUMIN 3.9  --   --   --    BILITOT 0.6  --   --   --    ALKPHOS 81  --   --   --    *  --   --   --    *  --   --   --    ANIONGAP 20* 16 14 8   EGFRNONAA >60.0 52* 43* 45*         Significant Imaging:  Head CT   Impression         1. Since the previous day's study there is marked cerebral and cerebellar edema with obscuration of the gray/white matter delineation.  There is now slit-like lateral ventricles with findings suggestive of uncal as well as downward transtentorial herniation.  There is also significant cerebellar edema with downward herniation of the cerebellar hemispheres through the foramen magnum.  2. Development of subarachnoid hemorrhage since the previous study.  3. Presence of subarachnoid hemorrhage since the previous study.  The results of this test were discussed with  at 9.38 a.m. on 09/19/2019, at the time of the interpretation of this test.      Electronically signed by: Kishore Akers  MD  Date: 09/19/2019  Time: 09:42                Assessment and Plan:      34 y/o male S/P cardiac arrest     1. Anoxic brain injury: pt with absent brainstem reflexes and head CT consistent with severe global cerebral edema causing herniation of cerebellar structures.           This is incompatible with life. This is the clinical exam of a person with brain death.     Pt made DNR. Performing CPR/ACLS in case of another cardiac arrest /CODE in this patient would be an exercise of futility.    Apnea test followed. Beginning 15:43; End 15: 53  Temp: 99.3 F  /110  Pre ABG's   pH 7.35   CO2: 40 mmHg     Post ABG's   pH: 7.06   CO2: 91  No spontaneous breathing while pt off mechanical ventilator for apnea testing  Pt has been declared brain dead on September 19th, 2019 at 15:57 pm.          Active Diagnoses:     Diagnosis Date Noted POA    PRINCIPAL PROBLEM:  Cardiac arrest [I46.9] 09/18/2019 Yes    Acute respiratory failure with hypercapnia [J96.02] 09/18/2019 Yes    Drug overdose [T50.901A] 09/18/2019 Yes    Acute encephalopathy [G93.40] 09/18/2019 Yes    Metabolic acidosis [E87.2] 09/18/2019 Yes    Anoxic brain injury [G93.1] 09/18/2019 Yes    GRISEL (acute kidney injury) [N17.9] 09/18/2019 Yes    Upper GI bleed [K92.2] 09/18/2019 Yes    Drug abuse, IV [F19.10] 07/18/2018 Yes    History of hepatitis C [Z86.19] 07/18/2018 Yes       Problems Resolved During this Admission:                VTE Risk Mitigation (From admission, onward)         Ordered       Reason for No Pharmacological VTE Prophylaxis  Once      09/18/19 1428       IP VTE HIGH RISK PATIENT  Once      09/18/19 1428       Place sequential compression device  Until discontinued      09/18/19 1419             Thank you for your consult. I will follow-up with patient. Please contact us if you have any additional questions.     Crow Graves MD  Neurology  [facility]

## 2019-09-19 NOTE — PROGRESS NOTES
"[facility] Medicine  Progress Note    Patient Name: Shahid Lackey  MRN: [id number]  Patient Class: IP- Inpatient   Admission Date: 9/18/2019  Length of Stay: 1 days  Attending Physician: Yo Hart MD  Primary Care Provider: Artem Rice MD        Subjective:     Principal Problem:Cardiac arrest        HPI:  HPI obtained from PMH and chart review due to patient's acuity on condition.    32 yo male with hx of polysubstance abuse with IVDU (with multiple admission in the past for OD), Hep C, Tobacco abuse and prior ICH presented via EMS to [facility] s/p cardiac arrest. He was found down by family around 8:15 AM prompting EMS. He was pulseless on EMS arrive with asystole on rhythm check. ACLS started, IO placed, and CPR continued while en route s/p ROSC en route. Given narcan with no response. Upon ED arrival, he had a pulse but GCS 3, intubated immediately for airway protection, on minimal vent settings. He was noted to have bilateral fixed, dilated pupils and no gag relfex. Right subclavian CVC placed, started on epi drip. He was noted to have blood in his NGT, suspected possible UGIB and subsequently started on Protonix priot to transfer.    OSH labs noting Hgb 14.6, lactate 11, gap 20, bicarb 22, Cr 1.5, Na 146, wbc 20, uds positive for benzo and THC. AGB with severe combined metabolic and respiratory acidosis (pH 7.02). CXR wnl. CTH noting "Diffuse loss of gray-white differentiation with over effacement of overlying cortical sulci and partial effacement of the lateral ventricles consistent with hypoxic anoxic insult and diffuse brain edema. Old areas of encephalomalacia right more extensively than left parietal region and intervening genu of corpus callosum as was previously seen." . Patient was [facility] to [facility] ICU for neuro evalaution for suspected anoxic brain injury.    Overview/Hospital Course:  Patient s/p asystole cardiac arrest 2/2 to suspected drug overload, " transferred from [facility] for neuro evaluation. Intubated. Hypothermia protocol initiated. Neurology and pulmonary consulted. Severe labs abnormalities noted. Initial CTH with cerebral edema, repeat with worsening edema with subarachnoid blood in ventricles and multiple herniations. No reflexes present at this time. Neurology evaluating patient for brain death.    Interval hx: Intubated. Back on low dose norepi. No reflexes. Family updated.    Review of Systems   Unable to perform ROS: Acuity of condition     Objective:     Vital Signs (Most Recent):  Temp: (!) 95 °F (35 °C) (09/19/19 1000)  Pulse: 87 (09/19/19 1000)  Resp: (!) 28 (09/19/19 1000)  BP: 100/71 (09/19/19 1000)  SpO2: (!) 92 % (09/19/19 1000) Vital Signs (24h Range):  Temp:  [94.3 °F (34.6 °C)-96.3 °F (35.7 °C)] 95 °F (35 °C)  Pulse:  [] 87  Resp:  [11-31] 28  SpO2:  [88 %-98 %] 92 %  BP: ()/() 100/71     Weight: 63.3 kg (139 lb 8.8 oz)  Body mass index is 21.22 kg/m².    Physical Exam   Constitutional:   Intubated   HENT:   Head: Normocephalic and atraumatic.   ET tube in place   Eyes: Conjunctivae are normal. No scleral icterus.   Dilated fixed pupils. No pupillary reflex. No corneal reflex.    Neck: Neck supple.   Cardiovascular: Normal rate and regular rhythm. Exam reveals no gallop and no friction rub.   No murmur heard.  Pulmonary/Chest: Effort normal and breath sounds normal.   Intubated. Scattered rhonchi. No wheeze or rales   Abdominal: Soft. He exhibits no distension and no mass.   Hypoactive BS   Genitourinary:   Genitourinary Comments: Lackey in place   Musculoskeletal: He exhibits no edema or deformity.   Neurological:   No sedation on exam. Unresponsive. Not following command. No movement. No responsive to sternal rubs. Pupillary and corneal reflex absent. Babinski neg. No myoclonus noted.   Skin: Capillary refill takes less than 2 seconds.   Cool and dry   Nursing note and vitals reviewed.       Significant Labs: All  "pertinent labs within the past 24 hours have been reviewed.    Significant Imaging: I have reviewed and interpreted all pertinent imaging results/findings within the past 24 hours.      Assessment/Plan:      * Cardiac arrest  Presented after being found down, unknown during of time, suspecting from drug overdose, etoh was positive. Pulseless on EMS arrival with asystole on rhythm. ACLS started (~20 mins from found), given epi and narcan. Rosc obtained prior to arrival to [facility] ED. He was noted to have bilateral fixed, dilated pupils and no gag relfex. GCS 3, intubated for airway protecting, on minimal vent settings. Right Subclavian placed and started on epi drip. Labs at OSH with lactate of 11, gap 20, bicarb 22, Cr 1.5, Na 146, wbc 10, uds positive for benzo and THC. AGB with severe combined metabolic and respiratory acidosis (pH 7.02). CXR wnl. CTH noting "Diffuse loss of gray-white differentiation with over effacement of overlying cortical sulci and partial effacement of the lateral ventricles consistent with hypoxic anoxic insult and diffuse brain edema. Old areas of encephalomalacia right more extensively than left parietal region and intervening genu of corpus callosum as was previously seen." Patient was transferred to [facility] ICU for neuro evalaution for suspected anoxic brain injury.  - Asystole cardiac arrest suspected from drug overdose, with CTH concerning for suspected anoxic brain injury  - Pulmonary consulted for co-mgmt, appreciate assistance / recs  - Neurology consulted for prognostication for anoxic brain injury  - Repeat CTH with worsening edema and brain herniation  - Hypothermia protocol, avoid fever in the first 48 hrs post arrest, now rewarming for apnea test  - Vasopressors as needed, back on low dose norepi  - Very poor prognosis, family updated (fatoumata sister) regarding course and prognosis    Acute respiratory failure with hypercapnia  Secondary to above  Pulmonary consulted  On " minimal vent setting at this time    Anoxic brain injury  Secondary to above. Neurology consulted    Acute encephalopathy  Secondary to above. Metabolic and toxic encephalopathy.    Drug overdose  Likely cause of his cardiac arrest. Several admission for OD this past yr.    Metabolic acidosis  Secondary to above    GRISEL (acute kidney injury)  Secondary to cardiac arrest and decrease CO    Upper GI bleed  NTG with 300 cc of blood   Type and screen  Serial H/H and transfuse as needed  Hgb remains stable at this time  PPI    Drug abuse, IV  Noted.    History of hepatitis C  Noted.      VTE Risk Mitigation (From admission, onward)        Ordered     Reason for No Pharmacological VTE Prophylaxis  Once      09/18/19 1428     IP VTE HIGH RISK PATIENT  Once      09/18/19 1428     Place sequential compression device  Until discontinued      09/18/19 1419          Critical care time spent on the evaluation and treatment of severe organ dysfunction, review of pertinent labs and imaging studies, discussions with consulting providers and discussions with patient/family: > 70 minutes.      Yo Hatr MD  Department of Hospital Medicine   [facility]

## 2019-09-19 NOTE — CARE UPDATE
[facility]  ICU Multidisciplinary Bedside Rounds   SUMMARY     Date: 9/19/2019    Prehospitalization: Home  Admit Date / LOS : 9/18/2019/ 1 days    Diagnosis: Cardiac arrest    Consults:        Active: Neuro, Pulm CC and RD       Needed: N/A     Code Status: Full Code   Advanced Directive: <no information>    LDA: Lackey, PIV, TLC and Vent       Central Lines/Site/Justification:Pressors       Urinary Cath/Order/Justification:Critically Ill in ICU    Vasopressors/Infusions:   Levo @ 0.06 mcg/kg/min  D5 1/2NS @ 125 cc/hr       GOALS: Volume/ Hemodynamic: MAP >65                     RASS: N/A    CAM ICU: Positive  Pain Management: none       Pain Controlled: not applicable     Rhythm: NSR    Respiratory Device: Vent    Vent Mode: PRVC  Oxygen Concentration (%):  [] 35  Resp Rate Total:  [20 br/min-33 br/min] 28 br/min  Vt Set:  [450 mL] 450 mL  PEEP/CPAP:  [5 cmH20] 5 cmH20  Pressure Support:  [0 cmH20] 0 cmH20  Mean Airway Pressure:  [9.7 exT63-56.5 cmH20] 11.4 cmH20             Most Recent SBT/ SAT: Does not meet criteria       MOVE Screen: FAIL    VTE Prophylaxis: Mechanical  Mobility: Bedrest and A: Assist  Stress Ulcer Prophylaxis: Yes    Dietary: NPO  Tolerance: not applicable  /  Advancement: no    Isolation: No active isolations    Restraints: No    Significant Dates:  Post Op Date: N/A  Rescue Date: N/A  Imaging/ Diagnostics: CT Head 9/18/19, CT routine scheduled for 9/19/19 (CT not able to fit in this morning, per Obed will call when ready)    Noteworthy Labs:  WBC 22.5, Cr 2.0    CBC/Anemia Labs: Coags:    Recent Labs   Lab 09/18/19  1148  09/18/19  1848 09/18/19  2311 09/19/19  0419   WBC  --    < > 32.94* 33.09* 22.50*   HGB  --    < > 16.5 17.0 16.3   HCT  --    < > 51.6 49.7 49.8   PLT  --    < > 294 300 264   MCV  --    < > 95 93 94   RDW  --    < > 15.2* 15.4* 15.2*   OCCULTBLOOD Positive*  --   --   --   --     < > = values in this interval not displayed.    Recent Labs   Lab  09/18/19  0925   INR 1.1        Chemistries:   Recent Labs   Lab 09/18/19  0925 09/18/19  1449 09/18/19  1847   * 144 147*   K 4.4 5.6* 4.4    114* 116*   CO2 22* 14* 17*   BUN 7 12 14   CREATININE 1.5* 1.7* 2.0*   CALCIUM 8.7 7.5* 7.1*   PROT 6.6  --   --    BILITOT 0.6  --   --    ALKPHOS 81  --   --    *  --   --    *  --   --         Cardiac Enzymes: Ejection Fractions:    Recent Labs     09/18/19  0925   TROPONINI 0.01    No results found for: EF     POCT Glucose: HbA1c:    Recent Labs   Lab 09/18/19  0944 09/18/19  1537 09/18/19  1609 09/18/19  2208 09/18/19  2307 09/19/19  0535   POCTGLUCOSE 222* 63* 93 163* 181* 138*    Hemoglobin A1C   Date Value Ref Range Status   06/19/2019 5.2 4.0 - 5.6 % Final     Comment:     ADA Screening Guidelines:  5.7-6.4%  Consistent with prediabetes  >or=6.5%  Consistent with diabetes  High levels of fetal hemoglobin interfere with the HbA1C  assay. Heterozygous hemoglobin variants (HbS, HgC, etc)do  not significantly interfere with this assay.   However, presence of multiple variants may affect accuracy.          Needs from Care Team: neuro to see pt, poss EEG?, pt started to produce more urine throughout shift approx 200cc/hr the last four hours     ICU LOS 17h  Level of Care: Critical Care

## 2019-09-19 NOTE — CARE UPDATE
Pt Received orally intubated with a 7.5 ET tube, secured at 23 cm at the lips. Pt on Servo I ventilator on documented settings. Alarms are set and functional, Ambu bag and mask at the bedside. NARN at this time, will continue to monitor and wean as tolerated.

## 2019-09-19 NOTE — PROGRESS NOTES
Went on transport to CT scan with transport vent.  Sats are fine.  Returned around 0902 and placed back on vent:    PRVC 28/450/+5/28%

## 2019-09-19 NOTE — CHAPLAIN
[facility]  - Dr. Maddox notified of patient being declared brain dead at 1357.  Per , he will defer to [facility] for possible donation, but will take patient for autopsy if donation does not go through for any reason.    [facility]  will  for [place] if patient is released from [facility].    MD MARC, Charge RN, bedside RN and House Supervisor notified.    [facility] 's number is [phone]

## 2019-09-19 NOTE — PROGRESS NOTES
Patient seen and examined, and case reviewed. Pt is completely unresponsive and without brain stem reflexes. Case also discussed with medical team inclusive of Dr. Rodriguez. I am in agreement with medical team that it would be medically futile to perform ACLS protocol in this patient, and I am in agreement with the 2 physician DNR.    JEFF Campos MD

## 2019-09-19 NOTE — SUBJECTIVE & OBJECTIVE
Interval hx: Intubated. Back on low dose norepi. No reflexes. Family updated.    Review of Systems   Unable to perform ROS: Acuity of condition     Objective:     Vital Signs (Most Recent):  Temp: (!) 95 °F (35 °C) (09/19/19 1000)  Pulse: 87 (09/19/19 1000)  Resp: (!) 28 (09/19/19 1000)  BP: 100/71 (09/19/19 1000)  SpO2: (!) 92 % (09/19/19 1000) Vital Signs (24h Range):  Temp:  [94.3 °F (34.6 °C)-96.3 °F (35.7 °C)] 95 °F (35 °C)  Pulse:  [] 87  Resp:  [11-31] 28  SpO2:  [88 %-98 %] 92 %  BP: ()/() 100/71     Weight: 63.3 kg (139 lb 8.8 oz)  Body mass index is 21.22 kg/m².    Physical Exam   Constitutional:   Intubated   HENT:   Head: Normocephalic and atraumatic.   ET tube in place   Eyes: Conjunctivae are normal. No scleral icterus.   Dilated fixed pupils. No pupillary reflex. No corneal reflex.    Neck: Neck supple.   Cardiovascular: Normal rate and regular rhythm. Exam reveals no gallop and no friction rub.   No murmur heard.  Pulmonary/Chest: Effort normal and breath sounds normal.   Intubated. Scattered rhonchi. No wheeze or rales   Abdominal: Soft. He exhibits no distension and no mass.   Hypoactive BS   Genitourinary:   Genitourinary Comments: Lackey in place   Musculoskeletal: He exhibits no edema or deformity.   Neurological:   No sedation on exam. Unresponsive. Not following command. No movement. No responsive to sternal rubs. Pupillary and corneal reflex absent. Babinski neg. No myoclonus noted.   Skin: Capillary refill takes less than 2 seconds.   Cool and dry   Nursing note and vitals reviewed.       Significant Labs: All pertinent labs within the past 24 hours have been reviewed.    Significant Imaging: I have reviewed and interpreted all pertinent imaging results/findings within the past 24 hours.

## 2019-09-19 NOTE — PLAN OF CARE
Problem: Adult Inpatient Plan of Care  Goal: Plan of Care Review  Outcome: Ongoing (interventions implemented as appropriate)  Patient remains in ICU, GCS 3, unresponsive to any stimuli, intubated and mechanically ventilated. PRVC 35%, RR 28, PEEP 5, . Levo titrated to maintain MAP >65. D5 1/2 NS infusing at 125cc/hr. Pt kept hypothermic to normothermic, rectal probe in use to provide continuous monitoring. UO 1250cc this shift, gardiner maintained to gravity. No BM. Pt turned q2 hr per protocol. No falls, injury, or skin breakdown this shift. Plan of care reviewed. MARC updated on pt condition.

## 2019-09-19 NOTE — SUBJECTIVE & OBJECTIVE
Interval History: On low dose norepi this morning. Repeat CT head with signs of herniation.     Review of Systems   Unable to perform ROS: Acuity of condition     Objective:     Vital Signs (Most Recent):  Temp: (!) 95.4 °F (35.2 °C) (09/19/19 0930)  Pulse: 88 (09/19/19 0930)  Resp: (!) 28 (09/19/19 0930)  BP: 101/74 (09/19/19 0930)  SpO2: 95 % (09/19/19 0930) Vital Signs (24h Range):  Temp:  [94.3 °F (34.6 °C)-96.3 °F (35.7 °C)] 95.4 °F (35.2 °C)  Pulse:  [] 88  Resp:  [11-31] 28  SpO2:  [88 %-98 %] 95 %  BP: ()/() 101/74     Weight: 63.3 kg (139 lb 8.8 oz)  Body mass index is 21.22 kg/m².      Intake/Output Summary (Last 24 hours) at 9/19/2019 1014  Last data filed at 9/19/2019 0900  Gross per 24 hour   Intake 3148.7 ml   Output 3235 ml   Net -86.3 ml       Physical Exam   Constitutional: He appears well-developed and well-nourished. He has a sickly appearance. No distress. He is intubated.   HENT:   Head: Normocephalic and atraumatic.   Eyes: No scleral icterus. Right pupil is not reactive. Left pupil is not reactive.   Neck: Neck supple. No tracheal deviation present.   Cardiovascular: Normal rate, regular rhythm and intact distal pulses.   No murmur heard.  Pulmonary/Chest: Breath sounds normal. He is intubated. He has no wheezes. He has no rhonchi. He has no rales.   Abdominal: Soft. Bowel sounds are normal. There is no rigidity.   Genitourinary:   Genitourinary Comments: Lackey in place   Musculoskeletal: He exhibits no edema.   Neurological: He is unresponsive.   Pupils 9mm and fixed bilaterally. No cough, gag, or corneal reflexes. Not breathing over the ventilator. No response to pain.    Skin: Skin is warm and dry. Capillary refill takes 2 to 3 seconds. He is not diaphoretic.   Nursing note and vitals reviewed.      Vents:  Vent Mode: PRVC (09/19/19 1006)  Set Rate: 28 bmp (09/19/19 1006)  Vt Set: 450 mL (09/19/19 1006)  PEEP/CPAP: 5 cmH20 (09/19/19 1006)  Oxygen Concentration (%): 100  (09/19/19 1006)  Peak Airway Pressure: 19.6 cmH2O (09/19/19 1006)  Total Ve: 12.8 mL (09/19/19 1006)  F/VT Ratio<105 (RSBI): (!) 60.48 (09/19/19 0433)    Lines/Drains/Airways     Central Venous Catheter Line                 Percutaneous Central Line Insertion/Assessment - triple lumen  09/18/19 1015 right subclavian less than 1 day          Drain                 NG/OG Tube 09/18/19 0933 nasogastric 18 Fr. Right nostril 1 day         Urethral Catheter 09/18/19 0945 Straight-tip 18 Fr. 1 day          Airway                 Airway - Non-Surgical 09/18/19 0927 Endotracheal Tube 1 day          Peripheral Intravenous Line                 Peripheral IV - Single Lumen 09/18/19 0925 18 G Right Antecubital 1 day         Peripheral IV - Single Lumen 09/18/19 0926 18 G Left Forearm 1 day                Significant Labs:    CBC/Anemia Profile:  Recent Labs   Lab 09/18/19  1148  09/18/19  2311 09/19/19  0419 09/19/19  0754   WBC  --    < > 33.09* 22.50* 20.00*   HGB  --    < > 17.0 16.3 16.1   HCT  --    < > 49.7 49.8 48.2   PLT  --    < > 300 264 253   MCV  --    < > 93 94 93   RDW  --    < > 15.4* 15.2* 15.3*   OCCULTBLOOD Positive*  --   --   --   --     < > = values in this interval not displayed.        Chemistries:  Recent Labs   Lab 09/18/19  0925 09/18/19  1449 09/18/19  1847 09/19/19  0754   * 144 147* 147*   K 4.4 5.6* 4.4 4.0    114* 116* 118*   CO2 22* 14* 17* 21*   BUN 7 12 14 20   CREATININE 1.5* 1.7* 2.0* 1.9*   CALCIUM 8.7 7.5* 7.1* 7.1*   ALBUMIN 3.9  --   --   --    PROT 6.6  --   --   --    BILITOT 0.6  --   --   --    ALKPHOS 81  --   --   --    *  --   --   --    *  --   --   --        All pertinent labs within the past 24 hours have been reviewed.    Significant Imaging:  I have reviewed and interpreted all pertinent imaging results/findings within the past 24 hours.

## 2019-09-19 NOTE — ASSESSMENT & PLAN NOTE
NTG with 300 cc of blood   Type and screen  Serial H/H and transfuse as needed  Hgb remains stable at this time  PPI

## 2019-09-19 NOTE — CONSULTS
[facility]  Neurology  Consult Note    Patient Name: Shahid Lackey  MRN: [id number]  Admission Date: 9/18/2019  Hospital Length of Stay: 1 days  Code Status: Full Code   Attending Provider: Yo Hart MD   Consulting Provider: Crow Graves MD  Primary Care Physician: Artem Rice MD  Principal Problem:Cardiac arrest    Inpatient consult to Neurology  Consult performed by: Crow Graves MD  Consult ordered by: Yo Hart MD        Subjective:     Chief Complaint/Reason for consult: S/P cardiac arrest     HPI: 34 y/o male with medical Hx as listed below was found unresponsive by family. Paramedics arrived to scene prompting ACLS protocol as pt was in asystole. This continued en-route to ED. Upon arrival to hospital he was noted to have fixed pupils, no gag reflex; apparently he did have spontneous respirations while in mechanical ventilator. Initially he was in an outside facility and trnasferred to this hospital for neurological evaluation.     Past Medical History:   Diagnosis Date    Current smoker     Drug abuse, IV     Hepatitis C     HIV (human immunodeficiency virus infection)        Past Surgical History:   Procedure Laterality Date    INSERT ARTERIAL LINE  6/20/2019            Review of patient's allergies indicates:  No Known Allergies    Current Neurological Medications:     Current Facility-Administered Medications on File Prior to Encounter   Medication    [COMPLETED] cefTRIAXone (ROCEPHIN) 1 g in dextrose 5 % 50 mL IVPB    [COMPLETED] pantoprazole 40 mg in dextrose 5 % 100 mL infusion (ready to mix system)    [COMPLETED] pantoprazole injection 80 mg    [COMPLETED] tranexamic acid (CYKLOKAPRON) 771 mg in sodium chloride 0.9% 100 mL    [DISCONTINUED] EPINEPHrine (ADRENALIN) 10 mg in sodium chloride 0.9% 250 mL infusion    [DISCONTINUED] tranexamic acid (CYKLOKAPRON) 1,000 mg in sodium chloride 0.9 % 100 mL (ready to mix system)     Current Outpatient  Medications on File Prior to Encounter   Medication Sig    acetaminophen (TYLENOL) 325 MG tablet Take 2 tablets (650 mg total) by mouth every 6 (six) hours as needed.    amLODIPine (NORVASC) 5 MG tablet Take 1 tablet (5 mg total) by mouth once daily.    buprenorphine-naloxone (SUBOXONE) 8-2 mg Film Place 2 each under the tongue once daily.    FLUoxetine 40 MG capsule Take 40 mg by mouth once daily.    folic acid (FOLVITE) 1 MG tablet Take 1 tablet (1 mg total) by mouth once daily.    magnesium oxide (MAG-OX) 400 mg (241.3 mg magnesium) tablet Take 1 tablet (400 mg total) by mouth once daily.    OLANZapine (ZYPREXA) 5 MG tablet Take 1 tablet (5 mg total) by mouth every 6 (six) hours as needed (agitation).    senna-docusate 8.6-50 mg (PERICOLACE) 8.6-50 mg per tablet Take 1 tablet by mouth 2 (two) times daily.    thiamine 100 MG tablet Take 1 tablet (100 mg total) by mouth once daily.      Family History     Problem Relation (Age of Onset)    No Known Problems Mother, Father        Tobacco Use    Smoking status: Current Every Day Smoker     Packs/day: 1.00    Smokeless tobacco: Never Used   Substance and Sexual Activity    Alcohol use: No    Drug use: Yes     Types: IV    Sexual activity: Not Currently     Review of Systems   Unable to perform ROS: Intubated     Objective:     Vital Signs (Most Recent):  Temp: (!) 95.2 °F (35.1 °C) (09/19/19 1145)  Pulse: 90 (09/19/19 1145)  Resp: (!) 28 (09/19/19 1145)  BP: (!) 131/93 (09/19/19 1145)  SpO2: 97 % (09/19/19 1145) Vital Signs (24h Range):  Temp:  [94.3 °F (34.6 °C)-96.3 °F (35.7 °C)] 95.2 °F (35.1 °C)  Pulse:  [] 90  Resp:  [11-31] 28  SpO2:  [92 %-98 %] 97 %  BP: ()/() 131/93     Weight: 63.3 kg (139 lb 8.8 oz)  Body mass index is 21.22 kg/m².    Physical Exam   Constitutional: He appears well-developed.   HENT:   Head: Normocephalic.   Eyes: Right eye exhibits no discharge. Left eye exhibits no discharge.   Neck: Normal range of motion.    Cardiovascular: Regular rhythm.   Pulmonary/Chest: Breath sounds normal.   Abdominal: Bowel sounds are normal.   Musculoskeletal: He exhibits no edema.     Neurological evaluation  No response to verbal stimuli  Pupils are round at 6 mm; not reactive to light  No oculocephalic response bilaterally  No vestibuloocular reflex bilaterally  No gag reflex  No cough reflex  No jaw jerk  No response to noxious stimulation to extremtieis       Significant Labs:   CBC:   Recent Labs   Lab 09/19/19  0419 09/19/19  0754 09/19/19  1020   WBC 22.50* 20.00* 16.93*   HGB 16.3 16.1 16.3   HCT 49.8 48.2 49.8    253 217     CMP:   Recent Labs   Lab 09/18/19  0925 09/18/19  1449 09/18/19  1847 09/19/19  0754   * 98 167* 181*   * 144 147* 147*   K 4.4 5.6* 4.4 4.0    114* 116* 118*   CO2 22* 14* 17* 21*   BUN 7 12 14 20   CREATININE 1.5* 1.7* 2.0* 1.9*   CALCIUM 8.7 7.5* 7.1* 7.1*   PROT 6.6  --   --   --    ALBUMIN 3.9  --   --   --    BILITOT 0.6  --   --   --    ALKPHOS 81  --   --   --    *  --   --   --    *  --   --   --    ANIONGAP 20* 16 14 8   EGFRNONAA >60.0 52* 43* 45*       Significant Imaging:  Head CT    Impression       1. Since the previous day's study there is marked cerebral and cerebellar edema with obscuration of the gray/white matter delineation.  There is now slit-like lateral ventricles with findings suggestive of uncal as well as downward transtentorial herniation.  There is also significant cerebellar edema with downward herniation of the cerebellar hemispheres through the foramen magnum.  2. Development of subarachnoid hemorrhage since the previous study.  3. Presence of subarachnoid hemorrhage since the previous study.  The results of this test were discussed with  at 9.38 a.m. on 09/19/2019, at the time of the interpretation of this test.      Electronically signed by: Kishore Akers MD  Date: 09/19/2019  Time: 09:42            Assessment and Plan:     34 y/o  male S/P cardiac arrest    1. Anoxic brain injury: pt with absent brainstem reflexes and head CT consistent with severe global cerebral edema causing herniation of cerebellar structures.   This is incompatible with life. This is the clinical exam of a person with brain death.   -Once normothermic (temp >36 C), SBP > 100 mmHg an apnea test can be performed.     Pt should be DNR. Performing CPR/ACLS in case of another cardiac arrest /CODE in this patient would be an exercise of futility.    Active Diagnoses:    Diagnosis Date Noted POA    PRINCIPAL PROBLEM:  Cardiac arrest [I46.9] 09/18/2019 Yes    Acute respiratory failure with hypercapnia [J96.02] 09/18/2019 Yes    Drug overdose [T50.901A] 09/18/2019 Yes    Acute encephalopathy [G93.40] 09/18/2019 Yes    Metabolic acidosis [E87.2] 09/18/2019 Yes    Anoxic brain injury [G93.1] 09/18/2019 Yes    GRISEL (acute kidney injury) [N17.9] 09/18/2019 Yes    Upper GI bleed [K92.2] 09/18/2019 Yes    Drug abuse, IV [F19.10] 07/18/2018 Yes    History of hepatitis C [Z86.19] 07/18/2018 Yes      Problems Resolved During this Admission:       VTE Risk Mitigation (From admission, onward)        Ordered     Reason for No Pharmacological VTE Prophylaxis  Once      09/18/19 1428     IP VTE HIGH RISK PATIENT  Once      09/18/19 1428     Place sequential compression device  Until discontinued      09/18/19 1419          Thank you for your consult. I will follow-up with patient. Please contact us if you have any additional questions.    Crow Graves MD  Neurology  [facility]

## 2019-09-19 NOTE — HOSPITAL COURSE
Patient s/p asystole cardiac arrest 2/2 to suspected drug overload, transferred from [facility] for neuro evaluation. Intubated. Hypothermia protocol initiated. Neurology and pulmonary consulted. Severe labs abnormalities noted. Initial CTH with cerebral edema, repeat with worsening edema with subarachnoid blood in ventricles and multiple herniations. No reflexes present at this time. Neurology evaluating patient for brain death. Apnea test performed for 10 mins with no spontaneous respirations. Pre and post ABG performed noting rise in CO2 from 40 to 91 mmHg, respectively. Patient declared brain dead on September 19th, 2019 at 15:57 PM. Family updated. Organ donation notified.

## 2019-09-19 NOTE — SIGNIFICANT EVENT
Death Note    Apnea test performed for 10 mins with no spontaneous respirations. Pre and post ABG performed noting rise in CO2 from 40 to 91 mmHg, respectively.    Patient declared brain dead on September 19th, 2019 at 15:57 PM

## 2019-09-19 NOTE — ASSESSMENT & PLAN NOTE
"Presented after being found down, unknown during of time, suspecting from drug overdose, etoh was positive. Pulseless on EMS arrival with asystole on rhythm. ACLS started (~20 mins from found), given epi and narcan. Rosc obtained prior to arrival to [facility] ED. He was noted to have bilateral fixed, dilated pupils and no gag relfex. GCS 3, intubated for airway protecting, on minimal vent settings. Right Subclavian placed and started on epi drip. Labs at OSH with lactate of 11, gap 20, bicarb 22, Cr 1.5, Na 146, wbc 10, uds positive for benzo and THC. AGB with severe combined metabolic and respiratory acidosis (pH 7.02). CXR wnl. CTH noting "Diffuse loss of gray-white differentiation with over effacement of overlying cortical sulci and partial effacement of the lateral ventricles consistent with hypoxic anoxic insult and diffuse brain edema. Old areas of encephalomalacia right more extensively than left parietal region and intervening genu of corpus callosum as was previously seen." Patient was transferred to [facility] ICU for neuro evalaution for suspected anoxic brain injury.  - Asystole cardiac arrest suspected from drug overdose, with CTH concerning for suspected anoxic brain injury  - Pulmonary consulted for co-mgmt, appreciate assistance / recs  - Neurology consulted for prognostication for anoxic brain injury  - Repeat CTH with worsening edema and brain herniation  - Hypothermia protocol, avoid fever in the first 48 hrs post arrest, now rewarming for apnea test  - Vasopressors as needed, back on low dose norepi  - Very poor prognosis, family updated (fatoumata sister) regarding course and prognosis  "

## 2019-09-19 NOTE — PROGRESS NOTES
Pt received on Servo I vent with settings as followed: PRVC 28/450/+5 and 35%.  Size 7.5 ETT in place and secure at 23 cm at the lip.  Ambu bag and mask at bedside and all alarms on and functioning. NARN. RT will continue to monitor pt status.

## 2019-09-19 NOTE — NURSING
Apnea test performed at bedside with Dr. Hart, Dr. Capmos, Dr. Graves and Respiratory. Patient remain tachycardia, heart rate 141 and B/P 159/108

## 2019-09-19 NOTE — PROGRESS NOTES
[facility]  Pulmonology  Progress Note    Patient Name: Shahid Lackey  MRN: [id number]  Admission Date: 9/18/2019  Hospital Length of Stay: 1 days  Code Status: Full Code  Attending Provider: Yo Hart MD  Primary Care Provider: Artem Rice MD   Principal Problem: Cardiac arrest    Subjective:     Interval History: On low dose norepi this morning. Repeat CT head with signs of herniation.     Review of Systems   Unable to perform ROS: Acuity of condition     Objective:     Vital Signs (Most Recent):  Temp: (!) 95.4 °F (35.2 °C) (09/19/19 0930)  Pulse: 88 (09/19/19 0930)  Resp: (!) 28 (09/19/19 0930)  BP: 101/74 (09/19/19 0930)  SpO2: 95 % (09/19/19 0930) Vital Signs (24h Range):  Temp:  [94.3 °F (34.6 °C)-96.3 °F (35.7 °C)] 95.4 °F (35.2 °C)  Pulse:  [] 88  Resp:  [11-31] 28  SpO2:  [88 %-98 %] 95 %  BP: ()/() 101/74     Weight: 63.3 kg (139 lb 8.8 oz)  Body mass index is 21.22 kg/m².      Intake/Output Summary (Last 24 hours) at 9/19/2019 1014  Last data filed at 9/19/2019 0900  Gross per 24 hour   Intake 3148.7 ml   Output 3235 ml   Net -86.3 ml       Physical Exam   Constitutional: He appears well-developed and well-nourished. He has a sickly appearance. No distress. He is intubated.   HENT:   Head: Normocephalic and atraumatic.   Eyes: No scleral icterus. Right pupil is not reactive. Left pupil is not reactive.   Neck: Neck supple. No tracheal deviation present.   Cardiovascular: Normal rate, regular rhythm and intact distal pulses.   No murmur heard.  Pulmonary/Chest: Breath sounds normal. He is intubated. He has no wheezes. He has no rhonchi. He has no rales.   Abdominal: Soft. Bowel sounds are normal. There is no rigidity.   Genitourinary:   Genitourinary Comments: Lackey in place   Musculoskeletal: He exhibits no edema.   Neurological: He is unresponsive.   Pupils 9mm and fixed bilaterally. No cough, gag, or corneal reflexes. Not breathing over the ventilator. No  response to pain.    Skin: Skin is warm and dry. Capillary refill takes 2 to 3 seconds. He is not diaphoretic.   Nursing note and vitals reviewed.      Vents:  Vent Mode: PRVC (09/19/19 1006)  Set Rate: 28 bmp (09/19/19 1006)  Vt Set: 450 mL (09/19/19 1006)  PEEP/CPAP: 5 cmH20 (09/19/19 1006)  Oxygen Concentration (%): 100 (09/19/19 1006)  Peak Airway Pressure: 19.6 cmH2O (09/19/19 1006)  Total Ve: 12.8 mL (09/19/19 1006)  F/VT Ratio<105 (RSBI): (!) 60.48 (09/19/19 0433)    Lines/Drains/Airways     Central Venous Catheter Line                 Percutaneous Central Line Insertion/Assessment - triple lumen  09/18/19 1015 right subclavian less than 1 day          Drain                 NG/OG Tube 09/18/19 0933 nasogastric 18 Fr. Right nostril 1 day         Urethral Catheter 09/18/19 0945 Straight-tip 18 Fr. 1 day          Airway                 Airway - Non-Surgical 09/18/19 0927 Endotracheal Tube 1 day          Peripheral Intravenous Line                 Peripheral IV - Single Lumen 09/18/19 0925 18 G Right Antecubital 1 day         Peripheral IV - Single Lumen 09/18/19 0926 18 G Left Forearm 1 day                Significant Labs:    CBC/Anemia Profile:  Recent Labs   Lab 09/18/19  1148  09/18/19  2311 09/19/19  0419 09/19/19  0754   WBC  --    < > 33.09* 22.50* 20.00*   HGB  --    < > 17.0 16.3 16.1   HCT  --    < > 49.7 49.8 48.2   PLT  --    < > 300 264 253   MCV  --    < > 93 94 93   RDW  --    < > 15.4* 15.2* 15.3*   OCCULTBLOOD Positive*  --   --   --   --     < > = values in this interval not displayed.        Chemistries:  Recent Labs   Lab 09/18/19  0925 09/18/19  1449 09/18/19  1847 09/19/19  0754   * 144 147* 147*   K 4.4 5.6* 4.4 4.0    114* 116* 118*   CO2 22* 14* 17* 21*   BUN 7 12 14 20   CREATININE 1.5* 1.7* 2.0* 1.9*   CALCIUM 8.7 7.5* 7.1* 7.1*   ALBUMIN 3.9  --   --   --    PROT 6.6  --   --   --    BILITOT 0.6  --   --   --    ALKPHOS 81  --   --   --    *  --   --   --    *   --   --   --        All pertinent labs within the past 24 hours have been reviewed.    Significant Imaging:  I have reviewed and interpreted all pertinent imaging results/findings within the past 24 hours.    Assessment/Plan:     * Cardiac arrest  - found down by family, unwitnessed arrest. Original rhythm asystole. ACLS X20 mins   - CT head concerning for anoxic insult  - Neurology consulted  - heating blanket for normothermia  - Repeat head CT with signs of herniation and new subarachnoid hemorrhage  - optimize pH and obtain normothermia for brain death exam     Acute respiratory failure with hypercapnia  - 2/2 cardiac arrest  - continue lung protective ventilation  - CXR with no focal consolidation  - minimal Fi02 requirements          Critical Care Time: 60 minutes  Critical secondary to Patient has a condition that poses threat to life and bodily function:  Cardiac arrest      Critical care was time spent personally by me on the following activities: development of treatment plan with patient or surrogate and bedside caregivers, discussions with consultants, evaluation of patient's response to treatment, examination of patient, ordering and performing treatments and interventions, ordering and review of laboratory studies, ordering and review of radiographic studies, pulse oximetry, re-evaluation of patient's condition. This critical care time did not overlap with that of any other provider or involve time for any procedures.       Danielle Hale NP  Pulmonology  [facility] [place]

## 2019-09-19 NOTE — NURSING
Notified Dr. Graves of the core temp.98.8 advised will be enroute shortly and notified Dr. Hart of code status advised still working on it

## 2019-09-20 NOTE — PLAN OF CARE
Problem: Adult Inpatient Plan of Care  Goal: Plan of Care Review  Outcome: Ongoing (interventions implemented as appropriate)  Pt remains on mechanical ventilation PRVC 28, Vt 580, +8, 40% FIO2 7.5 ETT/23 at the [facility]. AMBU bag and mask at the \[facility]\"". Continue xopenex, atrovent svn tx and CPT as ordered. Will continue to monitor. FRANCISCO Bishop, RRT

## 2019-09-20 NOTE — PROGRESS NOTES
Results reported to MARC Barroso.   No changes at this time.   Results for MAYELIN PEREZ (MRN [id number]) as of 9/20/2019 03:58   Ref. Range 9/20/2019 03:47   POC PH Latest Ref Range: 7.35 - 7.45  7.324 (L)   POC PCO2 Latest Ref Range: 35 - 45 mmHg 33.8 (L)   POC PO2 Latest Ref Range: 80 - 100 mmHg 150 (H)   POC BE Latest Ref Range: -2 to 2 mmol/L -7   POC HCO3 Latest Ref Range: 24 - 28 mmol/L 17.6 (L)   POC SATURATED O2 Latest Ref Range: 95 - 100 % 99   POC TCO2 Latest Ref Range: 23 - 27 mmol/L 19 (L)   FiO2 Unknown 60   Vt Unknown 550   PiP Unknown 25   PEEP Unknown 8   Sample Unknown ARTERIAL   DelSys Unknown Adult Vent   Allens Test Unknown N/A   Site Unknown Jared/UAC   Mode Unknown AC/PRVC   Rate Unknown 28

## 2019-09-20 NOTE — NURSING
Pt under the care of MARC with assistance from this RN. Instructed by MARC RNTip, that all charting will be completed through their charting system and by them.

## 2019-09-20 NOTE — PROGRESS NOTES
Results reported to [facility] nurse Chio.   VT increased to 550, FIO2 decreased to 50% per [facility] nurse Chio.   Results for MAYELIN PEREZ (MRN [id number]) as of 9/20/2019 02:05   Ref. Range 9/20/2019 00:59   POC PH Latest Ref Range: 7.35 - 7.45  7.197 (LL)   POC PCO2 Latest Ref Range: 35 - 45 mmHg 56.4 (HH)   POC PO2 Latest Ref Range: 80 - 100 mmHg 579 (H)   POC BE Latest Ref Range: -2 to 2 mmol/L -7   POC HCO3 Latest Ref Range: 24 - 28 mmol/L 21.9 (L)   POC SATURATED O2 Latest Ref Range: 95 - 100 % 100   POC TCO2 Latest Ref Range: 23 - 27 mmol/L 24   FiO2 Unknown 100   Vt Unknown 450   PiP Unknown 22   PEEP Unknown 8   Sample Unknown ARTERIAL   DelSys Unknown Adult Vent   Allens Test Unknown N/A   Site Unknown Jared/UAC   Mode Unknown AC/PRVC   Rate Unknown 28

## 2019-09-20 NOTE — PLAN OF CARE
19 0906   Final Note   Assessment Type Final Discharge Note   Anticipated Discharge Disposition

## 2019-09-20 NOTE — CARE UPDATE
Brain death was declare on pt today. MARC at bedside completing paperwork with pt sister and family. Pt to be discharged and readmitted under MARC.

## 2019-09-21 NOTE — ANESTHESIA PREPROCEDURE EVALUATION
2019    Pre-operative evaluation for Procedure(s) (LRB):  SURGICAL PROCUREMENT, PANCREAS,  DONOR (N/A)  SURGICAL PROCUREMENT, LUNG,  DONOR (N/A)  SURGICAL PROCUREMENT, LIVER, ENTIRE,  DONOR (N/A)  NEPHRECTOMY,  DONOR (N/A)    Shahid Lackey is a 33 y.o. male     Patient Active Problem List   Diagnosis    Cutaneous abscess of right upper extremity    History of hepatitis C    Drug abuse, IV    Leukocytosis    Generalized anxiety disorder    Major depressive disorder    Acute delirium    Lactic acidosis    Rhabdomyolysis    Fever    Left hemiparesis    Elevated blood pressure reading    Alteration in nutrition    Nontraumatic cortical hemorrhage of cerebral hemisphere    ICH (intracerebral hemorrhage)    Transaminitis    Vasogenic cerebral edema    Current smoker    Brain compression    Hypokalemia    Metabolic alkalosis    Sepsis    Foot drop, left    Acute respiratory failure with hypercapnia    Drug overdose    Acute encephalopathy    Metabolic acidosis    Cardiac arrest    Anoxic brain injury    GRISEL (acute kidney injury)    Upper GI bleed    Organ donation       Review of patient's allergies indicates:  No Known Allergies    No current facility-administered medications on file prior to encounter.      Current Outpatient Medications on File Prior to Encounter   Medication Sig Dispense Refill    acetaminophen (TYLENOL) 325 MG tablet Take 2 tablets (650 mg total) by mouth every 6 (six) hours as needed.  0    chlorhexidine (PERIDEX) 0.12 % solution Use as directed 15 mLs in the mouth or throat 2 (two) times daily. for 14 days  0    dextrose 5 % SolP 50 mL with promethazine 25 mg/mL Soln 6.25 mg Inject 6.25 mg into the vein every 6 (six) hours as needed.      dextrose 50% injection Inject 25 mLs (12.5 g total) into the vein as needed  (between 51 - 70 mg/dL if patient cannnot take PO but has IV access.).      dextrose 50% injection Inject 50 mLs (25 g total) into the vein as needed (less than 50 mg/dL if patient cannnot take PO but has IV access.).      hydrALAZINE (APRESOLINE) 20 mg/mL injection Inject 0.5 mLs (10 mg total) into the vein every 6 (six) hours as needed (sbp > 170).      labetalol (NORMODYNE,TRANDATE) 5 mg/mL injection Inject 2 mLs (10 mg total) into the vein every 6 (six) hours as needed (180, hold if HR < 70).      ondansetron 4 mg/2 mL Soln Inject 4 mg into the vein every 8 (eight) hours as needed.         Past Surgical History:   Procedure Laterality Date    INSERT ARTERIAL LINE  6/20/2019              CBC:   Recent Labs     09/21/19  0203 09/21/19  0800   WBC 37.40* 25.96*   RBC 4.84 4.47*   HGB 14.8 13.9*   HCT 44.9 41.9    175   MCV 93 94   MCH 30.6 31.1*   MCHC 33.0 33.2       CMP:   Recent Labs     09/21/19  0203 09/21/19  0500 09/21/19  0800   * 155* 156*   K 3.9  --  4.1   *  --  126*   CO2 21*  --  19*   BUN 15  --  14   CREATININE 1.3  --  1.4   *  --  285*   MG 1.7  --  1.7   PHOS 2.6*  --  2.8   CALCIUM 9.0  --  8.8   ALBUMIN 2.9*  --  2.7*   PROT 6.2  --  6.0   ALKPHOS 107  --  109   *  --  143*   AST 34  --  24   BILITOT 0.5  --  0.7       INR  Recent Labs     09/20/19  2220 09/21/19  0203 09/21/19  0800   INR 1.1 1.0 1.0   APTT 35.4* 34.9* 35.8*         Anesthesia Evaluation    I have reviewed the Patient Summary Reports.     I have reviewed the Medications.     Review of Systems  Social:  Smoker  Illicit Drug Use:  EENT/Dental:EENT/Dental Normal   Cardiovascular:   Pt s/p cardiac arrest likely due to OD   Pulmonary:  Pulmonary Normal    Hepatic/GI:   Hepatitis, C Suspected upper GIB   Neurological:   Neuromuscular Disease, (rhabdomyolysis) Anoxic brain injury  Acute encephalopathy   Endocrine:  Endocrine Normal    Psych:   Psychiatric History anxiety depression           Physical Exam  General:  Well nourished    Airway/Jaw/Neck:  Airway Findings: Pre-Existing Airway Tube(s): Oral Endotracheal tube     Dental:  DENTAL FINDINGS: Normal        Mental Status:  Mental Status Findings:  Unconscious         Anesthesia Plan  Type of Anesthesia, risks & benefits discussed:  Anesthesia Type:  general  Patient's Preference:   Intra-op Monitoring Plan: standard ASA monitors  Intra-op Monitoring Plan Comments:   Post Op Pain Control Plan:   Post Op Pain Control Plan Comments:   Induction:    Beta Blocker:         Informed Consent:    ASA Score: 2     Day of Surgery Review of History & Physical: I have interviewed and examined the patient. I have reviewed the patient's H&P dated:  There are no significant changes.      Anesthesia Plan Notes: MARC CASE        Ready For Surgery From Anesthesia Perspective.

## 2019-09-21 NOTE — PROGRESS NOTES
1110 Pt transferred to OR via bed with anesthesia and MARC coordinator Catherine, on o2 being bagged via ambu and transportable cardiac monitoring in place.

## 2019-09-21 NOTE — PROGRESS NOTES
0700 Report received. In care of MARC. Labs, vital signs, and intake/output being monitored. RN following.

## 2019-09-21 NOTE — TRANSFER OF CARE
Anesthesia Transfer of Care Note    Patient: Shahid Lackey    Procedure(s) Performed: Procedure(s) (LRB):  SURGICAL PROCUREMENT, PANCREAS,  DONOR (N/A)  SURGICAL PROCUREMENT, LUNG,  DONOR (N/A)  SURGICAL PROCUREMENT, LIVER, ENTIRE,  DONOR (N/A)  NEPHRECTOMY,  DONOR (N/A)    Patient location: Other: OR 4    Anesthesia Type: general    Comments: MARC case      Last vitals:   Visit Vitals  BP (!) 141/99   Pulse 103   Temp 35.7 °C (96.3 °F)   Resp (!) 28   Wt 61.6 kg (135 lb 12.9 oz)   SpO2 100%   BMI 20.65 kg/m²

## 2019-09-21 NOTE — PROGRESS NOTES
1048 Called and spoke with Mr. Mendez, on call for Dr. Maddox, with [facility] 's office. Updated on pt being transferred to surgery for organ procurement at 1100 am. Will update MARC.  1110 Pt taken to OR. MARC Alexander notified of Mr. Mendez's request for cardiac death time and autopsy. JESSICA Alexander reports she will call Mr. Mendez with details.

## 2019-09-22 LAB
BACTERIA SPEC AEROBE CULT: ABNORMAL
BACTERIA SPEC AEROBE CULT: ABNORMAL
BACTERIA UR CULT: NO GROWTH
GRAM STN SPEC: ABNORMAL

## 2019-09-22 NOTE — ANESTHESIA POSTPROCEDURE EVALUATION
Anesthesia Post Evaluation    Patient: Shahid Lackey    Procedure(s) Performed: Procedure(s) (LRB):  SURGICAL PROCUREMENT, PANCREAS,  DONOR (N/A)  SURGICAL PROCUREMENT, LUNG,  DONOR (N/A)  SURGICAL PROCUREMENT, LIVER, ENTIRE,  DONOR (N/A)  NEPHRECTOMY,  DONOR (N/A)    Final Anesthesia Type: general  Patient location during evaluation: floor  Patient participation: No - Unable to Participate, Other Reason (see comments)  Post-procedure mental status: .  Post-procedure vital signs: reviewed and stable  PONV status: .  Anesthetic complications: no      Cardiovascular status: .  Respiratory status: .  Follow-up not needed.  Comments: MARC case - pt           Vitals Value Taken Time   /99 2019 11:01 AM   Temp 21.6 °C (70.88 °F) 2019  3:03 PM   Pulse 103 2019  3:03 PM   Resp 28 2019 11:09 AM   SpO2 51 % 2019  3:03 PM   Vitals shown include unvalidated device data.      No case tracking events are documented in the log.      Pain/Herrera Score: No data recorded

## 2019-09-23 LAB
BACTERIA BLD CULT: NORMAL
BACTERIA BLD CULT: NORMAL

## 2019-09-25 LAB
BACTERIA BLD CULT: NORMAL
BACTERIA BLD CULT: NORMAL

## 2019-09-25 NOTE — DISCHARGE SUMMARY
"[facility]  Discharge Summary      Patient Name: Shahid Lackey  MRN: [id number]  Admission Date: 9/18/2019  Hospital Length of Stay: 1 days  Discharge Date and Time: 9/19/2019     Attending Physician: No att. providers found   Discharging Provider: Yo Hart MD  Primary Care Provider: Artem Rice MD      HPI:   HPI obtained from [facility] and chart review due to patient's acuity on condition.    32 yo male with hx of polysubstance abuse with IVDU (with multiple admission in the past for OD), Hep C, Tobacco abuse and prior ICH presented via EMS to [place] s/p cardiac arrest. He was found down by family around 8:15 AM prompting EMS. He was pulseless on EMS arrive with asystole on rhythm check. ACLS started, IO placed, and CPR continued while en route s/p ROSC en route. Given narcan with no response. Upon ED arrival, he had a pulse but GCS 3, intubated immediately for airway protection, on minimal vent settings. He was noted to have bilateral fixed, dilated pupils and no gag relfex. Right subclavian CVC placed, started on epi drip. He was noted to have blood in his NGT, suspected possible UGIB and subsequently started on Protonix priot to transfer.    OSH labs noting Hgb 14.6, lactate 11, gap 20, bicarb 22, Cr 1.5, Na 146, wbc 20, uds positive for benzo and THC. AGB with severe combined metabolic and respiratory acidosis (pH 7.02). CXR wnl. CTH noting "Diffuse loss of gray-white differentiation with over effacement of overlying cortical sulci and partial effacement of the lateral ventricles consistent with hypoxic anoxic insult and diffuse brain edema. Old areas of encephalomalacia right more extensively than left parietal region and intervening genu of corpus callosum as was previously seen." . Patient was transferred to [facility] ICU for neuro evalaution for suspected anoxic brain injury.    * No surgery found *      Hospital Course:   Patient s/p asystole cardiac arrest 2/2 " to suspected drug overload, transferred from [facility] for neuro evaluation. Intubated. Hypothermia protocol initiated. Neurology and pulmonary consulted. Severe labs abnormalities noted. Initial CTH with cerebral edema, repeat with worsening edema with subarachnoid blood in ventricles and multiple herniations. No reflexes present at this time. Neurology evaluating patient for brain death. Apnea test performed for 10 mins with no spontaneous respirations. Pre and post ABG performed noting rise in CO2 from 40 to 91 mmHg, respectively. Patient declared brain dead on 2019 at 15:57 PM. Family updated. Organ donation notified.      Consults:   Consults (From admission, onward)        Status Ordering Provider     Inpatient consult to Neurology  Once     Provider:  Crow Graves MD    Completed KAIDEN GONZALEZ     Inpatient consult to Pulmonology  Once     Provider:  Tank Rodriguez MD    Completed KAIDEN GONZALEZ          Final Active Diagnoses:    Diagnosis Date Noted POA    PRINCIPAL PROBLEM:  Cardiac arrest [I46.9] 2019 Yes    Acute respiratory failure with hypercapnia [J96.02] 2019 Yes    Anoxic brain injury [G93.1] 2019 Yes    Drug overdose [T50.901A] 2019 Yes    Acute encephalopathy [G93.40] 2019 Yes    Metabolic acidosis [E87.2] 2019 Yes    GRISEL (acute kidney injury) [N17.9] 2019 Yes    Upper GI bleed [K92.2] 2019 Yes    Drug abuse, IV [F19.10] 2018 Yes    History of hepatitis C [Z86.19] 2018 Yes      Problems Resolved During this Admission:       Discharged Condition:     Disposition:  Organ Donor    Follow Up:    Patient Instructions:   No discharge procedures on file.    Significant Diagnostic Studies: Labs: All labs within the past 24 hours have been reviewed    Pending Diagnostic Studies:     None         Medications:  None (patient  at medical facility)    Indwelling Lines/Drains at time of  discharge:   Lines/Drains/Airways     Central Venous Catheter Line                 Percutaneous Central Line Insertion/Assessment - triple lumen  09/18/19 1015 right subclavian 7 days          Drain                 NG/OG Tube 09/18/19 0933 nasogastric 18 Fr. Right nostril 7 days         Urethral Catheter 09/18/19 0945 Straight-tip 18 Fr. 7 days          Airway                 Airway - Non-Surgical 09/18/19 0927 Endotracheal Tube 7 days          Arterial Line                 Arterial Line 09/18/19 1200 Right Radial 6 days                Time spent on the discharge of patient: > 70 minutes  Patient was seen and examined on the date of discharge and determined to be suitable for discharge.    Critical care time spent on the evaluation and treatment of severe organ dysfunction, review of pertinent labs and imaging studies, discussions with consulting providers and discussions with patient/family: 75 minutes.     Yo Hart MD  Department of Hospital Medicine  [facility]

## 2019-09-26 NOTE — PHYSICIAN QUERY
PT Name: Shahid Lackey  MR #: [id number]    Physician Query Form - HIV Clarification     CDS/: Barb Murphy               Contact information: [email or web address]     This form is a permanent document in the medical record.     Query Date: September 26, 2019      By submitting this query, we are merely seeking further clarification of documentation. Please utilize your independent clinical judgment when addressing the question(s) below.    The Medical record contains the following:   Indicators   Supporting Clinical Findings Location in Medical Record   X HIV or AIDS HIV (human immunodeficiency virus infection Neurology PN 9/19    CD4 Count          CD4%        Viral Load      History of Opportunistic Infection      Cancer  Pneumocystis Pneumonia (PCP)  Cytomegalovirus (CMV)  Kaposi Sarcoma      HIV-Related Conditions (e.g. Dementia, Encephalopathy) documented      Medications     X Other HIV 1/2 Ag/Ab- negative  Infectious disease 9/18     Please clarify the patients HIV status  Per CDC publication Vol 60 RR-17 https://www.cdc.gov/mmwr/preview/mmwrhtml/oc1232w3.htmRelating to the classification HIV Infection, once a patient is diagnosed with AIDS the diagnosis still stands even if, after treatment, the CD4+ T cell count rises to above 200 per ìL of blood or other AIDS-defining illnesses are cured.       The following are AIDS-Defining Illnesses or HIV-Related Diseases.  Bacterial infections, multiple or recurrent only in children aged <6 years Kaposi sarcoma   Candidiasis of bronchi, trachea, or lungs Lymphoma, Burkitt (or equivalent term)   Candidiasis of esophagus Lymphoma, immunoblastic (or equivalent term)   Cervical cancer, invasive Only among adults, adolescents, and children aged > 6 years. Lymphoma, primary, of brain   Coccidioidomycosis, disseminated or extrapulmonary Mycobacterium avium complex or Mycobacterium kansasii, disseminated or extrapulmonary   Cryptococcosis, extrapulmonary  Mycobacterium tuberculosis of any site, pulmonary, disseminated, or extrapulmonary   Cryptosporidiosis, chronic intestinal (>1 months duration) Mycobacterium, other species or unidentified species, disseminated or extrapulmonary   Cytomegalovirus disease (other than liver, spleen, or nodes), onset at age >1 month Pneumocystis jirovecii (previously known as Pneumocystis carinii) pneumonia   Cytomegalovirus retinitis (with loss of vision) Pneumonia, recurrent Only among adults, adolescents, and children aged .6 years.   Encephalopathy attributed to HIV Progressive multifocal leukoencephalopathy   Herpes simplex: chronic ulcers (>1 months duration) or bronchitis, pneumonitis, or esophagitis (onset at age >1 month) Salmonella septicemia, recurrent   Histoplasmosis, disseminated or extrapulmonary Toxoplasmosis of brain, onset at age >1 month   Isosporiasis, chronic intestinal (>1 months duration) Wasting syndrome attributed to HIV       [   ] Asymptomatic HIV Infection - Positive Status Only - without any history of (or current) AIDS-Defining Illness or HIV-Related Illness     [   ] HIV Disease / AIDS - Meets the current CDC Definition of AIDS: HIV-infected persons who have less than 200 CD4+ T-lymphocytes/uL, or CD4+ T-lymphocyte percentage of total lymphocytes of less than 14, and/or an AIDS-Defining Illness or HIV-Related Disease (see above).     [ X ] Patient is HIV Negative   [   ] Other (please specify):   [  ] Clinically Undetermined         Please document in your progress notes daily for the duration of treatment until resolved and include in your discharge summary.

## 2020-02-24 NOTE — HOSPITAL COURSE
----- Message from Hero Cedillo MD sent at 2/23/2020  5:30 PM CST -----  Please make sure patient gets antibiotics. 6/19/2019 Admit to [facility] for ICH  6/20: stable overnight, no signs of withdrawal, sbp within parameters on cardene, vasc neurology consulted  06/24/2019 Started on sc heparin. Stable to step down to VN.

## 2020-10-15 NOTE — SUBJECTIVE & OBJECTIVE
No past medical history on file.    No past surgical history on file.    Review of patient's allergies indicates:  No Known Allergies    Current Facility-Administered Medications on File Prior to Encounter   Medication    [COMPLETED] 0.9%  NaCl infusion    [COMPLETED] 0.9%  NaCl infusion    [COMPLETED] 0.9%  NaCl infusion    [COMPLETED] cefTRIAXone (ROCEPHIN) 2 g in dextrose 5 % 50 mL IVPB    [COMPLETED] diphenhydrAMINE injection 50 mg    [COMPLETED] folic acid 1 mg in dextrose 5 % 100 mL IVPB    [COMPLETED] haloperidol lactate injection 5 mg    [COMPLETED] lorazepam injection 1 mg    [COMPLETED] lorazepam injection 1 mg    [COMPLETED] lorazepam injection 1 mg    [COMPLETED] lorazepam injection 2 mg    [COMPLETED] lorazepam injection 2 mg    [COMPLETED] lorazepam injection 2 mg    [COMPLETED] magnesium sulfate 2g in water 50mL IVPB (premix)    [COMPLETED] thiamine injection 100 mg    [COMPLETED] vancomycin 1.5 g in dextrose 5 % 250 mL IVPB (ready to mix)    [DISCONTINUED] lorazepam injection 2 mg    [DISCONTINUED] magnesium sulfate 2g in water 50mL IVPB (premix)    [DISCONTINUED] thiamine (B-1) 100 mg in dextrose 5 % 50 mL IVPB     Current Outpatient Medications on File Prior to Encounter   Medication Sig    [DISCONTINUED] clonazePAM (KLONOPIN) 0.5 MG tablet Take 0.5 mg by mouth 2 (two) times daily as needed for Anxiety.    [DISCONTINUED] FLUoxetine (PROZAC) 40 MG capsule Take 40 mg by mouth once daily.    [DISCONTINUED] gabapentin (NEURONTIN) 600 MG tablet Take 600 mg by mouth 3 (three) times daily.    [DISCONTINUED] naloxone (NARCAN) 4 mg/actuation Spry 4mg by nasal route as needed for opioid overdose; may repeat every 2-3 minutes in alternating nostrils until medical help arrives. Call 911     Family History     Problem Relation (Age of Onset)    No Known Problems Mother, Father        Tobacco Use    Smoking status: Current Every Day Smoker     Packs/day: 1.00    Smokeless tobacco: Never  Used   Substance and Sexual Activity    Alcohol use: No    Drug use: Yes     Types: IV    Sexual activity: Not Currently     Review of Systems   Unable to perform ROS: Mental status change     Objective:     Vital Signs (Most Recent):    Vital Signs (24h Range):  Temp:  [98.1 °F (36.7 °C)] 98.1 °F (36.7 °C)  Pulse:  [] 121  Resp:  [18] 18  SpO2:  [97 %-100 %] 98 %  BP: (114-166)/(57-93) 141/79        There is no height or weight on file to calculate BMI.    Physical Exam   Constitutional: He appears well-developed and well-nourished. No distress.   HENT:   Head: Normocephalic.   Eyes: Right eye exhibits no discharge. Left eye exhibits no discharge. No scleral icterus.   Pupils mid dilated, minimally responsive to light.   Neck: Neck supple. JVD present.   Cardiovascular: Normal rate, regular rhythm and normal heart sounds. Exam reveals no gallop and no friction rub.   No murmur heard.  Pulmonary/Chest: Effort normal and breath sounds normal. No stridor. No respiratory distress. He has no wheezes.   Abdominal: Soft. Bowel sounds are normal. He exhibits no distension. There is no tenderness. There is no guarding.   Musculoskeletal: He exhibits no edema or deformity.   Neurological:   Patient obtunded and currently unarousable. Unable to perform a detailed neurological exam.   Skin: Skin is warm and dry. He is not diaphoretic.   Multiple tattoos present.   Psychiatric:   Unable to assess.           Significant Labs:   A1C: No results for input(s): HGBA1C in the last 4320 hours.  ABGs: No results for input(s): PH, PCO2, HCO3, POCSATURATED, BE, TOTALHB, COHB, METHB, O2HB, POCFIO2 in the last 48 hours.  Bilirubin:   Recent Labs   Lab 06/15/19  1226   BILITOT 1.0     Blood Culture: No results for input(s): LABBLOO in the last 48 hours.  BMP:   Recent Labs   Lab 06/15/19  1226   *   *   K 3.3*   *   CO2 23   BUN 9   CREATININE 1.0   CALCIUM 9.1   MG 2.2     CBC:   Recent Labs   Lab 06/15/19  1226  developed and well-nourished. No distress.   HENT:   Head: Normocephalic.   Eyes: Conjunctivae and EOM are normal. Pupils are equal, round, and reactive to light.   Neck: Normal range of motion.   Cardiovascular: Normal rate and regular rhythm.   No murmur heard.  Pulmonary/Chest: Breath sounds normal. No respiratory distress. He has no wheezes. He has no rales.   Abdominal: Soft. Bowel sounds are normal. He exhibits no distension. There is no abdominal tenderness.   Musculoskeletal: Normal range of motion.   Neurological: He is alert and oriented to person, place, and time. He has normal strength. No sensory deficit. Coordination and gait normal.   Motor strength of b/l UE and LE 5/5. Finger to nose normal. Heel to shin normal. No Pronator drift. No facial droop or asymmetry. Speech is clear. Tongue protrudes midline with no fasciculations.   Skin: Skin is warm and dry.         ED Course   Procedures  Labs Reviewed   CBC W/ AUTO DIFFERENTIAL - Abnormal; Notable for the following components:       Result Value    RBC 3.76 (*)     Hemoglobin 11.6 (*)     Hematocrit 35.3 (*)     All other components within normal limits   COMPREHENSIVE METABOLIC PANEL - Abnormal; Notable for the following components:    CO2 17 (*)     Glucose 221 (*)     BUN, Bld 27 (*)     Creatinine 2.4 (*)        WBC 26.40*   HGB 15.8   HCT 47.4   *     CMP:   Recent Labs   Lab 06/15/19  1226   *   K 3.3*   *   CO2 23   *   BUN 9   CREATININE 1.0   CALCIUM 9.1   PROT 7.6   ALBUMIN 4.4   BILITOT 1.0   ALKPHOS 82   AST 54*   ALT 30   ANIONGAP 12   EGFRNONAA >60.0     Cardiac Markers: No results for input(s): CKMB, MYOGLOBIN, BNP, TROPISTAT in the last 48 hours.  Coagulation: No results for input(s): PT, INR, APTT in the last 48 hours.  Lactic Acid:   Recent Labs   Lab 06/15/19  1316 06/15/19  1725   LACTATE 8.0* 3.5*     Lipase: No results for input(s): LIPASE in the last 48 hours.  Lipid Panel: No results for input(s): CHOL, HDL, LDLCALC, TRIG, CHOLHDL in the last 48 hours.  Magnesium:   Recent Labs   Lab 06/15/19  1226   MG 2.2       Significant Imaging: I have reviewed all pertinent imaging results/findings within the past 24 hours.   Riddhi Black PA-C  10/15/20 0832

## 2021-07-28 NOTE — PLAN OF CARE
Patient in ICU unconscious at present.     09/18/19 1544   Discharge Assessment   Assessment Type Discharge Planning Assessment   Assessment information obtained from? Medical Record   Prior to hospitilization cognitive status: Unable to Assess   Current cognitive status: Unable to Assess;Coma/Sedated/Intubated   Current Functional Status: Completely Dependent   Is patient able to care for self after discharge? Unable to determine at this time (comments)   Patient's perception of discharge disposition admitted as an inpatient   Readmission Within the Last 30 Days unable to assess   Patient currently being followed by outpatient case management? Unable to determine (comments)   Patient currently receives any other outside agency services? No   Equipment Currently Used at Home other (see comments)  (unable to assess)   Do you have any problems affording any of your prescribed medications? TBD   Discharge Plan A Other  (Unconscious in ICU)   Discharge Plan B Other  (TBD)   DME Needed Upon Discharge  none   Patient/Family in Agreement with Plan unable to assess      There are no Wet Read(s) to document.

## 2023-09-29 NOTE — PLAN OF CARE
06/26/19 1233   Post-Acute Status   Post-Acute Authorization Placement   Post-Acute Placement Status Referrals Sent       Pt is PEC'd nd in need of PT/OT. Referral sent to [facility].  SW in contact with CM and Medical staff. Will continue to follow and offer support as needed.     Michael Du, LMSW [facility]   Ext. [phone]       Patient called back, informed of approved referral. Given information to contact [facility] counseling. Writer offered to leave copy of behavioral health resources in [facility] if unable to get in with [facility] counseling. Patient requests this be mailed to his residency.   Letter with resources mailed. Referral faxed to [facility].

## (undated) DEVICE — COVER OVERHEAD SURG LT BLUE

## (undated) DEVICE — ELECTRODE REM PLYHSV RETURN 9

## (undated) DEVICE — SYR 30CC LUER LOCK

## (undated) DEVICE — DRAPE SLUSH WARMER WITH DISC

## (undated) DEVICE — GLOVE SURGICAL LATEX SZ 8

## (undated) DEVICE — SHEET DRAPE FAN-FOLDED 3/4

## (undated) DEVICE — NDL BIOPSY TRU-CUT 14G 6IN LF

## (undated) DEVICE — SEE MEDLINE ITEM 146313

## (undated) DEVICE — SEE MEDLINE ITEM 152487

## (undated) DEVICE — SYR SLIP TIP 20CC

## (undated) DEVICE — SEE MEDLINE ITEM 157117

## (undated) DEVICE — SOL NS 1000CC

## (undated) DEVICE — GLOVE SURGICAL LATEX SZ 7

## (undated) DEVICE — SEE MEDLINE ITEM 146292

## (undated) DEVICE — PACK ENDOSCOPY GENERAL

## (undated) DEVICE — SOL 9P NACL IRR PIC IL

## (undated) DEVICE — SEE MEDLINE ITEM 107746

## (undated) DEVICE — SUT SILK 2-0 STRANDS 30IN

## (undated) DEVICE — PENCIL ROCKER SWITCH 10FT CORD